# Patient Record
Sex: FEMALE | Race: WHITE | Employment: UNEMPLOYED | URBAN - NONMETROPOLITAN AREA
[De-identification: names, ages, dates, MRNs, and addresses within clinical notes are randomized per-mention and may not be internally consistent; named-entity substitution may affect disease eponyms.]

---

## 2017-02-06 ENCOUNTER — TELEPHONE (OUTPATIENT)
Dept: FAMILY MEDICINE CLINIC | Age: 57
End: 2017-02-06

## 2017-02-06 DIAGNOSIS — M54.5 CHRONIC LOW BACK PAIN, UNSPECIFIED BACK PAIN LATERALITY, WITH SCIATICA PRESENCE UNSPECIFIED: Primary | ICD-10-CM

## 2017-02-06 DIAGNOSIS — G89.29 CHRONIC LOW BACK PAIN, UNSPECIFIED BACK PAIN LATERALITY, WITH SCIATICA PRESENCE UNSPECIFIED: Primary | ICD-10-CM

## 2017-02-06 DIAGNOSIS — M47.812 CERVICAL SPONDYLOSIS WITHOUT MYELOPATHY: ICD-10-CM

## 2017-02-06 DIAGNOSIS — M47.817 LUMBOSACRAL SPONDYLOSIS WITHOUT MYELOPATHY: ICD-10-CM

## 2017-02-06 RX ORDER — BACLOFEN 10 MG/1
10 TABLET ORAL 3 TIMES DAILY PRN
Qty: 60 TABLET | Refills: 0 | Status: SHIPPED | OUTPATIENT
Start: 2017-02-06 | End: 2017-02-27 | Stop reason: SDUPTHER

## 2017-04-20 DIAGNOSIS — M47.812 CERVICAL SPONDYLOSIS WITHOUT MYELOPATHY: ICD-10-CM

## 2017-04-20 DIAGNOSIS — G89.29 CHRONIC LOW BACK PAIN, UNSPECIFIED BACK PAIN LATERALITY, WITH SCIATICA PRESENCE UNSPECIFIED: ICD-10-CM

## 2017-04-20 DIAGNOSIS — M54.5 CHRONIC LOW BACK PAIN, UNSPECIFIED BACK PAIN LATERALITY, WITH SCIATICA PRESENCE UNSPECIFIED: ICD-10-CM

## 2017-04-20 DIAGNOSIS — M47.817 LUMBOSACRAL SPONDYLOSIS WITHOUT MYELOPATHY: ICD-10-CM

## 2017-04-20 RX ORDER — BACLOFEN 10 MG/1
TABLET ORAL
Qty: 60 TABLET | Refills: 2 | Status: SHIPPED | OUTPATIENT
Start: 2017-04-20 | End: 2017-07-13 | Stop reason: SDUPTHER

## 2017-07-25 ENCOUNTER — OFFICE VISIT (OUTPATIENT)
Dept: FAMILY MEDICINE CLINIC | Age: 57
End: 2017-07-25

## 2017-07-25 VITALS
DIASTOLIC BLOOD PRESSURE: 80 MMHG | WEIGHT: 198 LBS | HEART RATE: 98 BPM | SYSTOLIC BLOOD PRESSURE: 150 MMHG | HEIGHT: 61 IN | OXYGEN SATURATION: 99 % | BODY MASS INDEX: 37.38 KG/M2

## 2017-07-25 DIAGNOSIS — G89.29 CHRONIC PAIN OF LEFT ELBOW: Primary | ICD-10-CM

## 2017-07-25 DIAGNOSIS — I10 ESSENTIAL HYPERTENSION: ICD-10-CM

## 2017-07-25 DIAGNOSIS — M25.522 CHRONIC PAIN OF LEFT ELBOW: Primary | ICD-10-CM

## 2017-07-25 DIAGNOSIS — Z12.31 SCREENING MAMMOGRAM, ENCOUNTER FOR: ICD-10-CM

## 2017-07-25 PROCEDURE — 99214 OFFICE O/P EST MOD 30 MIN: CPT | Performed by: FAMILY MEDICINE

## 2017-07-25 ASSESSMENT — ENCOUNTER SYMPTOMS
ABDOMINAL PAIN: 0
BLURRED VISION: 0

## 2017-08-16 ENCOUNTER — HOSPITAL ENCOUNTER (OUTPATIENT)
Dept: GENERAL RADIOLOGY | Age: 57
Discharge: HOME | End: 2017-08-16
Payer: COMMERCIAL

## 2017-08-16 DIAGNOSIS — M79.671 RIGHT FOOT PAIN: ICD-10-CM

## 2017-08-16 PROBLEM — M54.12 CERVICAL NERVE ROOT DISORDER: Status: ACTIVE | Noted: 2017-03-03

## 2017-08-16 PROCEDURE — 73630 X-RAY EXAM OF FOOT: CPT

## 2017-08-17 ENCOUNTER — OFFICE VISIT (OUTPATIENT)
Dept: FAMILY MEDICINE CLINIC | Age: 57
End: 2017-08-17

## 2017-08-17 VITALS
BODY MASS INDEX: 37.49 KG/M2 | SYSTOLIC BLOOD PRESSURE: 162 MMHG | HEIGHT: 61 IN | WEIGHT: 198.6 LBS | OXYGEN SATURATION: 97 % | TEMPERATURE: 98.6 F | HEART RATE: 105 BPM | DIASTOLIC BLOOD PRESSURE: 98 MMHG

## 2017-08-17 DIAGNOSIS — M20.61 TOE DEFORMITY, ACQUIRED, RIGHT: ICD-10-CM

## 2017-08-17 DIAGNOSIS — T23.201D: ICD-10-CM

## 2017-08-17 DIAGNOSIS — M79.671 RIGHT FOOT PAIN: Primary | ICD-10-CM

## 2017-08-17 PROCEDURE — 99213 OFFICE O/P EST LOW 20 MIN: CPT | Performed by: NURSE PRACTITIONER

## 2017-08-17 RX ORDER — MELOXICAM 7.5 MG/1
7.5 TABLET ORAL 2 TIMES DAILY
Qty: 60 TABLET | Refills: 1 | Status: SHIPPED | OUTPATIENT
Start: 2017-08-17 | End: 2018-04-10

## 2017-08-22 ENCOUNTER — APPOINTMENT (OUTPATIENT)
Dept: MRI IMAGING | Age: 57
End: 2017-08-22
Payer: COMMERCIAL

## 2017-08-22 ENCOUNTER — HOSPITAL ENCOUNTER (OUTPATIENT)
Dept: WOMENS IMAGING | Age: 57
Discharge: HOME | End: 2017-08-22
Payer: COMMERCIAL

## 2017-08-22 DIAGNOSIS — Z12.31 SCREENING MAMMOGRAM, ENCOUNTER FOR: ICD-10-CM

## 2017-08-22 PROCEDURE — 77063 BREAST TOMOSYNTHESIS BI: CPT

## 2017-08-25 ENCOUNTER — TELEPHONE (OUTPATIENT)
Dept: FAMILY MEDICINE CLINIC | Age: 57
End: 2017-08-25

## 2017-08-27 DIAGNOSIS — G89.29 CHRONIC PAIN OF LEFT ELBOW: Primary | ICD-10-CM

## 2017-08-27 DIAGNOSIS — M25.522 CHRONIC PAIN OF LEFT ELBOW: Primary | ICD-10-CM

## 2017-08-28 ENCOUNTER — TELEPHONE (OUTPATIENT)
Dept: FAMILY MEDICINE CLINIC | Age: 57
End: 2017-08-28

## 2018-04-10 ENCOUNTER — OFFICE VISIT (OUTPATIENT)
Dept: FAMILY MEDICINE CLINIC | Age: 58
End: 2018-04-10
Payer: COMMERCIAL

## 2018-04-10 VITALS
SYSTOLIC BLOOD PRESSURE: 138 MMHG | DIASTOLIC BLOOD PRESSURE: 90 MMHG | BODY MASS INDEX: 36.7 KG/M2 | WEIGHT: 194.4 LBS | HEIGHT: 61 IN | OXYGEN SATURATION: 98 % | HEART RATE: 103 BPM

## 2018-04-10 DIAGNOSIS — L30.9 DERMATITIS: ICD-10-CM

## 2018-04-10 DIAGNOSIS — R73.9 HYPERGLYCEMIA: ICD-10-CM

## 2018-04-10 DIAGNOSIS — I10 ESSENTIAL HYPERTENSION: ICD-10-CM

## 2018-04-10 DIAGNOSIS — R63.5 WEIGHT GAIN: ICD-10-CM

## 2018-04-10 DIAGNOSIS — I10 ESSENTIAL HYPERTENSION: Primary | ICD-10-CM

## 2018-04-10 LAB
ALT SERPL-CCNC: 32 U/L (ref 0–33)
ANION GAP SERPL CALCULATED.3IONS-SCNC: 13 MEQ/L (ref 7–13)
AST SERPL-CCNC: 23 U/L (ref 0–35)
BASOPHILS ABSOLUTE: 0.1 K/UL (ref 0–0.2)
BASOPHILS RELATIVE PERCENT: 1 %
BUN BLDV-MCNC: 8 MG/DL (ref 6–20)
CALCIUM SERPL-MCNC: 9.3 MG/DL (ref 8.6–10.2)
CHLORIDE BLD-SCNC: 100 MEQ/L (ref 98–107)
CHOLESTEROL, TOTAL: 194 MG/DL (ref 0–199)
CO2: 27 MEQ/L (ref 22–29)
CREAT SERPL-MCNC: 0.5 MG/DL (ref 0.5–0.9)
EOSINOPHILS ABSOLUTE: 0.3 K/UL (ref 0–0.7)
EOSINOPHILS RELATIVE PERCENT: 4 %
GFR AFRICAN AMERICAN: >60
GFR NON-AFRICAN AMERICAN: >60
GLUCOSE BLD-MCNC: 79 MG/DL (ref 74–109)
HBA1C MFR BLD: 5.3 % (ref 4.8–5.9)
HCT VFR BLD CALC: 40.2 % (ref 37–47)
HDLC SERPL-MCNC: 76 MG/DL (ref 40–59)
HEMOGLOBIN: 14 G/DL (ref 12–16)
LDL CHOLESTEROL CALCULATED: 94 MG/DL (ref 0–129)
LYMPHOCYTES ABSOLUTE: 1.6 K/UL (ref 1–4.8)
LYMPHOCYTES RELATIVE PERCENT: 24.7 %
MCH RBC QN AUTO: 34.2 PG (ref 27–31.3)
MCHC RBC AUTO-ENTMCNC: 34.7 % (ref 33–37)
MCV RBC AUTO: 98.5 FL (ref 82–100)
MONOCYTES ABSOLUTE: 0.6 K/UL (ref 0.2–0.8)
MONOCYTES RELATIVE PERCENT: 9.8 %
NEUTROPHILS ABSOLUTE: 4 K/UL (ref 1.4–6.5)
NEUTROPHILS RELATIVE PERCENT: 60.5 %
PDW BLD-RTO: 12.5 % (ref 11.5–14.5)
PLATELET # BLD: 307 K/UL (ref 130–400)
POTASSIUM SERPL-SCNC: 4.3 MEQ/L (ref 3.5–5.1)
RBC # BLD: 4.08 M/UL (ref 4.2–5.4)
SODIUM BLD-SCNC: 140 MEQ/L (ref 132–144)
TRIGL SERPL-MCNC: 122 MG/DL (ref 0–200)
TSH SERPL DL<=0.05 MIU/L-ACNC: 1.53 UIU/ML (ref 0.27–4.2)
WBC # BLD: 6.6 K/UL (ref 4.8–10.8)

## 2018-04-10 PROCEDURE — 99214 OFFICE O/P EST MOD 30 MIN: CPT | Performed by: FAMILY MEDICINE

## 2018-04-10 RX ORDER — TRIAMCINOLONE ACETONIDE 1 MG/G
CREAM TOPICAL
Qty: 80 G | Refills: 1 | Status: SHIPPED | OUTPATIENT
Start: 2018-04-10 | End: 2018-06-11 | Stop reason: SDUPTHER

## 2018-04-10 RX ORDER — LISINOPRIL AND HYDROCHLOROTHIAZIDE 25; 20 MG/1; MG/1
1 TABLET ORAL
Qty: 30 TABLET | Refills: 3 | Status: SHIPPED | OUTPATIENT
Start: 2018-04-10 | End: 2018-07-15 | Stop reason: ALTCHOICE

## 2018-04-10 RX ORDER — LISINOPRIL 20 MG/1
20 TABLET ORAL EVERY EVENING
Qty: 30 TABLET | Refills: 3 | Status: SHIPPED | OUTPATIENT
Start: 2018-04-10 | End: 2018-06-11

## 2018-04-10 ASSESSMENT — ENCOUNTER SYMPTOMS
ABDOMINAL PAIN: 0
SHORTNESS OF BREATH: 0
COUGH: 0
DIARRHEA: 0

## 2018-06-11 ENCOUNTER — OFFICE VISIT (OUTPATIENT)
Dept: FAMILY MEDICINE CLINIC | Age: 58
End: 2018-06-11
Payer: COMMERCIAL

## 2018-06-11 VITALS
HEIGHT: 61 IN | SYSTOLIC BLOOD PRESSURE: 138 MMHG | BODY MASS INDEX: 37.76 KG/M2 | WEIGHT: 200 LBS | HEART RATE: 98 BPM | OXYGEN SATURATION: 98 % | DIASTOLIC BLOOD PRESSURE: 82 MMHG

## 2018-06-11 DIAGNOSIS — L30.9 DERMATITIS: ICD-10-CM

## 2018-06-11 DIAGNOSIS — Z79.899 HIGH RISK MEDICATION USE: ICD-10-CM

## 2018-06-11 DIAGNOSIS — I10 ESSENTIAL HYPERTENSION: Primary | ICD-10-CM

## 2018-06-11 DIAGNOSIS — B35.1 TOENAIL FUNGUS: ICD-10-CM

## 2018-06-11 DIAGNOSIS — M51.26 LUMBAR HERNIATED DISC: ICD-10-CM

## 2018-06-11 PROCEDURE — 99213 OFFICE O/P EST LOW 20 MIN: CPT | Performed by: FAMILY MEDICINE

## 2018-06-11 RX ORDER — TRIAMCINOLONE ACETONIDE 1 MG/G
CREAM TOPICAL
Qty: 80 G | Refills: 1 | Status: ON HOLD | OUTPATIENT
Start: 2018-06-11 | End: 2018-09-20

## 2018-06-11 RX ORDER — LISINOPRIL 40 MG/1
40 TABLET ORAL DAILY
Qty: 30 TABLET | Refills: 3 | Status: SHIPPED | OUTPATIENT
Start: 2018-06-11 | End: 2018-07-18

## 2018-06-11 ASSESSMENT — ENCOUNTER SYMPTOMS
SHORTNESS OF BREATH: 0
BACK PAIN: 1
ABDOMINAL PAIN: 0

## 2018-07-12 DIAGNOSIS — Z79.899 HIGH RISK MEDICATION USE: ICD-10-CM

## 2018-07-12 DIAGNOSIS — I10 ESSENTIAL HYPERTENSION: ICD-10-CM

## 2018-07-12 LAB
ANION GAP SERPL CALCULATED.3IONS-SCNC: 17 MEQ/L (ref 7–13)
BUN BLDV-MCNC: 10 MG/DL (ref 6–20)
CALCIUM SERPL-MCNC: 8.7 MG/DL (ref 8.6–10.2)
CHLORIDE BLD-SCNC: 91 MEQ/L (ref 98–107)
CO2: 29 MEQ/L (ref 22–29)
CREAT SERPL-MCNC: 0.58 MG/DL (ref 0.5–0.9)
GFR AFRICAN AMERICAN: >60
GFR NON-AFRICAN AMERICAN: >60
GLUCOSE BLD-MCNC: 81 MG/DL (ref 74–109)
POTASSIUM SERPL-SCNC: 3.2 MEQ/L (ref 3.5–5.1)
SODIUM BLD-SCNC: 137 MEQ/L (ref 132–144)

## 2018-07-15 DIAGNOSIS — E87.6 HYPOKALEMIA: Primary | ICD-10-CM

## 2018-07-15 DIAGNOSIS — E87.8 HYPOCHLOREMIA: ICD-10-CM

## 2018-07-18 ENCOUNTER — TELEPHONE (OUTPATIENT)
Dept: FAMILY MEDICINE CLINIC | Age: 58
End: 2018-07-18

## 2018-07-18 RX ORDER — LISINOPRIL 20 MG/1
20 TABLET ORAL DAILY
Qty: 30 TABLET | Refills: 5 | Status: SHIPPED | OUTPATIENT
Start: 2018-07-18 | End: 2019-01-15 | Stop reason: SDUPTHER

## 2018-07-18 NOTE — TELEPHONE ENCOUNTER
Pt called, states that Ashok Jerry told her Lisinopril 20 without diuretic would be sent to pharmacy, due to dehydration. Please send to [facility] in [place]. Pt of Dr. Jase Paul.

## 2018-07-25 ENCOUNTER — HOSPITAL ENCOUNTER (OUTPATIENT)
Dept: MRI IMAGING | Age: 58
Discharge: HOME | End: 2018-07-27
Payer: COMMERCIAL

## 2018-07-25 DIAGNOSIS — G95.9 CERVICAL MYELOPATHY (HCC): ICD-10-CM

## 2018-07-25 DIAGNOSIS — M54.16 LUMBAR RADICULOPATHY: ICD-10-CM

## 2018-07-25 DIAGNOSIS — M47.816 LUMBAR SPONDYLOSIS: ICD-10-CM

## 2018-07-25 PROCEDURE — 72156 MRI NECK SPINE W/O & W/DYE: CPT

## 2018-07-25 PROCEDURE — 6360000004 HC RX CONTRAST MEDICATION: Performed by: NEUROLOGICAL SURGERY

## 2018-07-25 PROCEDURE — A9579 GAD-BASE MR CONTRAST NOS,1ML: HCPCS | Performed by: NEUROLOGICAL SURGERY

## 2018-07-25 PROCEDURE — 72158 MRI LUMBAR SPINE W/O & W/DYE: CPT

## 2018-07-25 RX ADMIN — GADOTERIDOL 17 ML: 279.3 INJECTION, SOLUTION INTRAVENOUS at 18:30

## 2018-08-16 DIAGNOSIS — E87.6 HYPOKALEMIA: ICD-10-CM

## 2018-08-16 DIAGNOSIS — E87.8 HYPOCHLOREMIA: ICD-10-CM

## 2018-08-16 LAB
ANION GAP SERPL CALCULATED.3IONS-SCNC: 18 MEQ/L (ref 7–13)
BUN BLDV-MCNC: 5 MG/DL (ref 6–20)
CALCIUM SERPL-MCNC: 8.8 MG/DL (ref 8.6–10.2)
CHLORIDE BLD-SCNC: 97 MEQ/L (ref 98–107)
CO2: 21 MEQ/L (ref 22–29)
CREAT SERPL-MCNC: 0.67 MG/DL (ref 0.5–0.9)
GFR AFRICAN AMERICAN: >60
GFR NON-AFRICAN AMERICAN: >60
GLUCOSE BLD-MCNC: 118 MG/DL (ref 74–109)
POTASSIUM SERPL-SCNC: 3.9 MEQ/L (ref 3.5–5.1)
SODIUM BLD-SCNC: 136 MEQ/L (ref 132–144)

## 2018-08-20 DIAGNOSIS — R73.9 HYPERGLYCEMIA: Primary | ICD-10-CM

## 2018-08-24 DIAGNOSIS — R73.9 HYPERGLYCEMIA: ICD-10-CM

## 2018-08-24 LAB — HBA1C MFR BLD: 5.4 % (ref 4.8–5.9)

## 2018-09-11 ENCOUNTER — OFFICE VISIT (OUTPATIENT)
Dept: FAMILY MEDICINE CLINIC | Age: 58
End: 2018-09-11
Payer: COMMERCIAL

## 2018-09-11 VITALS
HEIGHT: 61 IN | BODY MASS INDEX: 34.93 KG/M2 | HEART RATE: 82 BPM | DIASTOLIC BLOOD PRESSURE: 72 MMHG | OXYGEN SATURATION: 99 % | WEIGHT: 185 LBS | SYSTOLIC BLOOD PRESSURE: 128 MMHG

## 2018-09-11 DIAGNOSIS — L92.3 TATTOO REACTION: ICD-10-CM

## 2018-09-11 DIAGNOSIS — M54.5 CHRONIC LOW BACK PAIN, UNSPECIFIED BACK PAIN LATERALITY, WITH SCIATICA PRESENCE UNSPECIFIED: ICD-10-CM

## 2018-09-11 DIAGNOSIS — G89.29 CHRONIC LOW BACK PAIN, UNSPECIFIED BACK PAIN LATERALITY, WITH SCIATICA PRESENCE UNSPECIFIED: ICD-10-CM

## 2018-09-11 DIAGNOSIS — Z12.31 SCREENING MAMMOGRAM, ENCOUNTER FOR: ICD-10-CM

## 2018-09-11 DIAGNOSIS — I10 ESSENTIAL HYPERTENSION: Primary | ICD-10-CM

## 2018-09-11 PROCEDURE — 99214 OFFICE O/P EST MOD 30 MIN: CPT | Performed by: FAMILY MEDICINE

## 2018-09-11 RX ORDER — CICLOPIROX 7.7 MG/G
GEL TOPICAL 2 TIMES DAILY
COMMUNITY
End: 2021-05-24 | Stop reason: SDUPTHER

## 2018-09-11 ASSESSMENT — ENCOUNTER SYMPTOMS
ABDOMINAL PAIN: 0
SHORTNESS OF BREATH: 0
BACK PAIN: 1
BLURRED VISION: 0

## 2018-09-11 ASSESSMENT — PATIENT HEALTH QUESTIONNAIRE - PHQ9
SUM OF ALL RESPONSES TO PHQ QUESTIONS 1-9: 0
1. LITTLE INTEREST OR PLEASURE IN DOING THINGS: 0
SUM OF ALL RESPONSES TO PHQ9 QUESTIONS 1 & 2: 0
SUM OF ALL RESPONSES TO PHQ QUESTIONS 1-9: 0
2. FEELING DOWN, DEPRESSED OR HOPELESS: 0

## 2018-09-11 NOTE — PROGRESS NOTES
Subjective  Will Sauer, 66 y.o. female presents today with:  Chief Complaint   Patient presents with    3 Month Follow-Up       Hypertension   This is a new problem. The current episode started more than 1 year ago. The problem has been rapidly improving since onset. The problem is controlled. Pertinent negatives include no blurred vision, chest pain, palpitations or shortness of breath. Agents associated with hypertension include NSAIDs. Risk factors for coronary artery disease include obesity and post-menopausal state. Past treatments include ACE inhibitors. The current treatment provides significant improvement. There are no compliance problems. There is no history of kidney disease. Here today for 3 month f/u on chronic problems including HTN, chronic back pain. She has been seeing Dr. Paulie Ward and is considering surgery for her lower back. She saw Austin Cantu for her foot and does not have to see him anymore. She was referred to derm/Dr. More Hanson for a rash on her right foot and the rash is now gone. She is having jaw pain on the right and is seeing her dentist. She also got a tattoo on her left upper arm that is hurting. She got the tattoo 3 weeks ago. States the  cut her left upper arm. Review of Systems   Constitutional: Negative for fever. Eyes: Negative for blurred vision. Respiratory: Negative for shortness of breath. Cardiovascular: Negative for chest pain and palpitations. Gastrointestinal: Negative for abdominal pain. Musculoskeletal: Positive for back pain. Skin: Negative for rash.        Past Medical History:   Diagnosis Date    Alcoholic fatty liver 91/47/7479    By  Liver U/S 12/09/14    Arthritis     Borderline hyperlipidemia 11/13/2014    Bunion, right foot     Chronic back pain     Chronic neck pain     Degenerative disc disease, lumbar 11/13/2014    L5-S1    Dermatitis     Elevated liver enzymes 11/13/2014    History of alcohol abuse 12/3/2014    Hypochloremia 2014    Hypokalemia     Hyponatremia     Lateral epicondylitis 3/14/2016     Updating Deprecated Diagnoses    Lumbar herniated disc     Obesity (BMI 30-39.9) 2014    Pedal edema     Restless leg syndrome     RLS (restless legs syndrome)     Thrombocytosis (HCC)     Tinea pedis 2014    Toenail fungus     UTI (lower urinary tract infection) 2014     Past Surgical History:   Procedure Laterality Date    BACK SURGERY  2015    Dr. Aaron Richardson Right 12/15/2016    [facility]  LAPIDUS BUNIONECTOMY     SECTION      CHOLECYSTECTOMY, LAPAROSCOPIC  2018    Dr. Stephanie Soto       Social History     Social History    Marital status:      Spouse name: N/A    Number of children: 2    Years of education: N/A     Occupational History    unemployed      Social History Main Topics    Smoking status: Never Smoker    Smokeless tobacco: Never Used      Comment: pt states that she smoked maybe 5 cigarettes in  her teens.  Alcohol use No      Comment: quit 2015    Drug use: No    Sexual activity: Yes     Partners: Male     Other Topics Concern    Not on file     Social History Narrative    No narrative on file     Family History   Problem Relation Age of Onset    High Blood Pressure Mother     Arthritis Mother     High Blood Pressure Father     Arthritis Father      Allergies   Allergen Reactions    Amlodipine Swelling    Lyrica [Pregabalin] Other (See Comments)     PASSING OUT  Passed out    Toprol Xl [Metoprolol] Other (See Comments) and Swelling     swelling     Current Outpatient Prescriptions   Medication Sig Dispense Refill    Ciclopirox (LOPROX) 0.77 % gel Apply topically 2 times daily      mupirocin (BACTROBAN) 2 % ointment Apply 3 times daily.  30 g 0    lisinopril (PRINIVIL;ZESTRIL) 20 MG tablet Take 1 tablet by mouth daily 30 tablet 5    ciclopirox (PENLAC) 8 % solution Apply nightly to nails, Should wipe off once weekly with alcohol soaked cotton ball. 6.6 mL 5    diclofenac (VOLTAREN) 50 MG EC tablet Take 1 tablet by mouth 2 times daily 60 tablet 3    DULoxetine (CYMBALTA) 60 MG extended release capsule TAKE 1 CAPSULE BY MOUTH DAILY 30 capsule 11    pramipexole (MIRAPEX) 0.125 MG tablet Take 1 tablet by mouth nightly 30 tablet 5    baclofen (LIORESAL) 10 MG tablet Take 1 tablet by mouth 2 times daily as needed for muscle pain and spasms 60 tablet 2    clindamycin (CLEOCIN T) 1 % lotion Apply topically 2 times daily for 2 weeks 60 mL 0    triamcinolone (KENALOG) 0.1 % cream Apply topically 2 times daily Monday through Friday only. Take weekend off. Do not use on face, groin, armpits, breasts tissue. 80 g 1    Wound Dressings ([facility] WOUND/BURN DRESSING) GEL gel Apply to affected areas 2 times daily 44 mL 3     Current Facility-Administered Medications   Medication Dose Route Frequency Provider Last Rate Last Dose    dexamethasone (PF) (DECADRON) injection 10 mg  10 mg Other Once Cholo Nielsen MD           Objective    Vitals:    09/11/18 1634   BP: 128/72   Pulse: 82   SpO2: 99%   Weight: 185 lb (83.9 kg)   Height: 5' 1\" (1.549 m)       Physical Exam   Constitutional: She appears well-developed and well-nourished. HENT:   Head: Normocephalic and atraumatic. Right Ear: Tympanic membrane, external ear and ear canal normal.   Left Ear: Tympanic membrane, external ear and ear canal normal.   Nose: Nose normal.   Mouth/Throat: Uvula is midline, oropharynx is clear and moist and mucous membranes are normal.   Neck: Normal range of motion. Neck supple. Cardiovascular: Normal rate, regular rhythm and normal heart sounds. No murmur heard. Pulmonary/Chest: Effort normal and breath sounds normal. She has no wheezes. Lymphadenopathy:     She has no cervical adenopathy. Skin: Skin is warm and dry. No rash noted.    New tattoo on left upper arm       Assessment & Plan Diagnosis Orders   1. Essential hypertension     2. Chronic low back pain, unspecified back pain laterality, with sciatica presence unspecified     3. Tattoo reaction  mupirocin (BACTROBAN) 2 % ointment   4. Screening mammogram, encounter for  ARNOL DIGITAL SCREEN W CAD BILATERAL     BP well controlled co continue same. Plan as noted above. She will f/u with Dr. Holly Carver as directed. She will apply mupirocin ointment. Orders Placed This Encounter   Procedures    ARNOL DIGITAL SCREEN W CAD BILATERAL     Standing Status:   Future     Standing Expiration Date:   11/9/2019     Orders Placed This Encounter   Medications    mupirocin (BACTROBAN) 2 % ointment     Sig: Apply 3 times daily. Dispense:  30 g     Refill:  0     There are no discontinued medications. Return in about 4 months (around 1/11/2019).     Kraol Miranda MD

## 2018-09-13 ENCOUNTER — TELEPHONE (OUTPATIENT)
Dept: FAMILY MEDICINE CLINIC | Age: 58
End: 2018-09-13

## 2018-09-13 NOTE — LETTER
9/17/2018        To Whom It May Concern,        Louann Collins is cleared for her surgery.         Sincerely,        Dr Nadia Tanner

## 2018-09-13 NOTE — TELEPHONE ENCOUNTER
[facility] Dr. Brandon Burnett- Pt is requesting letter stating the pt is cleared for back surgery on 09/20/18. Pt was last seen on 09/11/18. Please advise. Pt phone number [phone].

## 2018-09-19 ENCOUNTER — HOSPITAL ENCOUNTER (OUTPATIENT)
Dept: GENERAL RADIOLOGY | Age: 58
Discharge: HOME | DRG: 455 | End: 2018-09-21
Payer: COMMERCIAL

## 2018-09-19 ENCOUNTER — HOSPITAL ENCOUNTER (OUTPATIENT)
Dept: PREADMISSION TESTING | Age: 58
Discharge: HOME | DRG: 455 | End: 2018-09-23
Payer: COMMERCIAL

## 2018-09-19 VITALS
RESPIRATION RATE: 18 BRPM | OXYGEN SATURATION: 96 % | BODY MASS INDEX: 37.11 KG/M2 | TEMPERATURE: 97.4 F | DIASTOLIC BLOOD PRESSURE: 101 MMHG | HEART RATE: 90 BPM | HEIGHT: 60 IN | SYSTOLIC BLOOD PRESSURE: 174 MMHG | WEIGHT: 189 LBS

## 2018-09-19 LAB
ABO/RH: NORMAL
ANION GAP SERPL CALCULATED.3IONS-SCNC: 19 MEQ/L (ref 7–13)
ANTIBODY SCREEN: NORMAL
BACTERIA: NORMAL /HPF
BILIRUBIN URINE: NEGATIVE
BLOOD, URINE: NEGATIVE
BUN BLDV-MCNC: 5 MG/DL (ref 6–20)
CALCIUM SERPL-MCNC: 8.5 MG/DL (ref 8.6–10.2)
CHLORIDE BLD-SCNC: 98 MEQ/L (ref 98–107)
CLARITY: CLEAR
CO2: 23 MEQ/L (ref 22–29)
COLOR: YELLOW
CREAT SERPL-MCNC: 0.54 MG/DL (ref 0.5–0.9)
EKG ATRIAL RATE: 79 BPM
EKG P AXIS: 36 DEGREES
EKG P-R INTERVAL: 130 MS
EKG Q-T INTERVAL: 414 MS
EKG QRS DURATION: 96 MS
EKG QTC CALCULATION (BAZETT): 474 MS
EKG R AXIS: -12 DEGREES
EKG T AXIS: 46 DEGREES
EKG VENTRICULAR RATE: 79 BPM
GFR AFRICAN AMERICAN: >60
GFR NON-AFRICAN AMERICAN: >60
GLUCOSE BLD-MCNC: 85 MG/DL (ref 74–109)
GLUCOSE URINE: NEGATIVE MG/DL
HCT VFR BLD CALC: 37.9 % (ref 37–47)
HEMOGLOBIN: 13.2 G/DL (ref 12–16)
INR BLD: 1
KETONES, URINE: NEGATIVE MG/DL
LEUKOCYTE ESTERASE, URINE: ABNORMAL
MCH RBC QN AUTO: 34.7 PG (ref 27–31.3)
MCHC RBC AUTO-ENTMCNC: 34.9 % (ref 33–37)
MCV RBC AUTO: 99.7 FL (ref 82–100)
NITRITE, URINE: NEGATIVE
PDW BLD-RTO: 14.2 % (ref 11.5–14.5)
PH UA: 7 (ref 5–9)
PLATELET # BLD: 258 K/UL (ref 130–400)
POTASSIUM SERPL-SCNC: 4 MEQ/L (ref 3.5–5.1)
PROTEIN UA: NEGATIVE MG/DL
PROTHROMBIN TIME: 10.2 SEC (ref 9.6–12.3)
RBC # BLD: 3.81 M/UL (ref 4.2–5.4)
RBC UA: NORMAL /HPF (ref 0–2)
SODIUM BLD-SCNC: 140 MEQ/L (ref 132–144)
SPECIFIC GRAVITY UA: 1 (ref 1–1.03)
URINE REFLEX TO CULTURE: YES
UROBILINOGEN, URINE: 0.2 E.U./DL
WBC # BLD: 5.3 K/UL (ref 4.8–10.8)
WBC UA: NORMAL /HPF (ref 0–5)

## 2018-09-19 PROCEDURE — 86901 BLOOD TYPING SEROLOGIC RH(D): CPT

## 2018-09-19 PROCEDURE — 85610 PROTHROMBIN TIME: CPT

## 2018-09-19 PROCEDURE — 86900 BLOOD TYPING SEROLOGIC ABO: CPT

## 2018-09-19 PROCEDURE — 87081 CULTURE SCREEN ONLY: CPT

## 2018-09-19 PROCEDURE — 87086 URINE CULTURE/COLONY COUNT: CPT

## 2018-09-19 PROCEDURE — 85027 COMPLETE CBC AUTOMATED: CPT

## 2018-09-19 PROCEDURE — 86850 RBC ANTIBODY SCREEN: CPT

## 2018-09-19 PROCEDURE — 93005 ELECTROCARDIOGRAM TRACING: CPT

## 2018-09-19 PROCEDURE — 80048 BASIC METABOLIC PNL TOTAL CA: CPT

## 2018-09-19 PROCEDURE — 72082 X-RAY EXAM ENTIRE SPI 2/3 VW: CPT

## 2018-09-19 PROCEDURE — 81001 URINALYSIS AUTO W/SCOPE: CPT

## 2018-09-19 RX ORDER — GENTAMICIN SULFATE 80 MG/100ML
80 INJECTION, SOLUTION INTRAVENOUS ONCE
Status: CANCELLED | OUTPATIENT
Start: 2018-09-20 | End: 2018-09-20

## 2018-09-19 RX ORDER — SODIUM CHLORIDE, SODIUM LACTATE, POTASSIUM CHLORIDE, CALCIUM CHLORIDE 600; 310; 30; 20 MG/100ML; MG/100ML; MG/100ML; MG/100ML
INJECTION, SOLUTION INTRAVENOUS CONTINUOUS
Status: CANCELLED | OUTPATIENT
Start: 2018-09-20

## 2018-09-19 RX ORDER — SODIUM CHLORIDE 0.9 % (FLUSH) 0.9 %
10 SYRINGE (ML) INJECTION PRN
Status: CANCELLED | OUTPATIENT
Start: 2018-09-19

## 2018-09-19 RX ORDER — SODIUM CHLORIDE 0.9 % (FLUSH) 0.9 %
10 SYRINGE (ML) INJECTION EVERY 12 HOURS SCHEDULED
Status: CANCELLED | OUTPATIENT
Start: 2018-09-19

## 2018-09-19 RX ORDER — SODIUM CHLORIDE, SODIUM LACTATE, POTASSIUM CHLORIDE, CALCIUM CHLORIDE 600; 310; 30; 20 MG/100ML; MG/100ML; MG/100ML; MG/100ML
INJECTION, SOLUTION INTRAVENOUS CONTINUOUS
Status: CANCELLED | OUTPATIENT
Start: 2018-09-19

## 2018-09-19 RX ORDER — LIDOCAINE HYDROCHLORIDE 10 MG/ML
1 INJECTION, SOLUTION EPIDURAL; INFILTRATION; INTRACAUDAL; PERINEURAL
Status: CANCELLED | OUTPATIENT
Start: 2018-09-19 | End: 2018-09-19

## 2018-09-19 ASSESSMENT — ENCOUNTER SYMPTOMS
SORE THROAT: 0
CONSTIPATION: 0
WHEEZING: 0
EYES NEGATIVE: 1
COUGH: 0
DIARRHEA: 0
HEARTBURN: 0
SHORTNESS OF BREATH: 0
STRIDOR: 0
DOUBLE VISION: 0
BACK PAIN: 1
NAUSEA: 1

## 2018-09-19 NOTE — H&P
Nurse Practitioner History and Physical      CHIEF COMPLAINT:  Low back pain     HISTORY OF PRESENT ILLNESS:      The patient is a 66 y.o. female with significant past medical history of lumbar 5 sacral 1 spondylolysis, DDD radiculopathy who presents for lumbar 5 sacral 1 PLIF. C/o low back pain that radiates to both buttocks & bilateral hips & down both legs. States feet become painful & numb & has muscle pain in both anterior thighs. Had lumbar disc OR in 2004. Did well since OR until about 1 year ago with onset of sx that progressively worsened. Scheduled for OR.     Past Medical History:        Diagnosis Date    Alcoholic fatty liver 82/35/9887    By  Liver U/S 12/09/14    Arthritis     Borderline hyperlipidemia 11/13/2014    has never been on meds    Bunion, right foot     Chronic back pain     Chronic neck pain     Degenerative disc disease, lumbar 11/13/2014    L5-S1    Dermatitis     Elevated liver enzymes 11/13/2014    History of alcohol abuse 12/3/2014    Hypertension     meds > 5 yrs    Hypochloremia 11/13/2014    Hypokalemia     Hyponatremia     Lateral epicondylitis 3/14/2016     Updating Deprecated Diagnoses    Lumbar herniated disc     Obesity (BMI 30-39.9) 11/6/2014    Pedal edema     Restless leg syndrome     RLS (restless legs syndrome)     Thrombocytosis (HCC)     Tinea pedis 12/16/2014    Toenail fungus     UTI (lower urinary tract infection) 11/19/2014     Past Surgical History:    Past Surgical History:   Procedure Laterality Date    BACK SURGERY  02/27/2012    Dr. Panchito Shaho / L 4 disc OR    BUNIONECTOMY Right 12/15/2016    [facility]  LAPIDUS BUNIONECTOMY   [id number], LAPAROSCOPIC  02/05/2018    Dr. Eliezer Frederick      L 4 disc OR    TONSILLECTOMY           Medications Prior to Admission:    Current Outpatient Prescriptions   Medication Sig Dispense Refill    Ciclopirox (LOPROX) 0.77 % gel Apply topically 2 times daily      lisinopril (PRINIVIL;ZESTRIL) 20 MG tablet Take 1 tablet by mouth daily (Patient taking differently: Take 60 mg by mouth daily ) 30 tablet 5    ciclopirox (PENLAC) 8 % solution Apply nightly to nails, Should wipe off once weekly with alcohol soaked cotton ball. 6.6 mL 5    triamcinolone (KENALOG) 0.1 % cream Apply topically 2 times daily Monday through Friday only. Take weekend off. Do not use on face, groin, armpits, breasts tissue. 80 g 1    DULoxetine (CYMBALTA) 60 MG extended release capsule TAKE 1 CAPSULE BY MOUTH DAILY 30 capsule 11    Wound Dressings ([facility] WOUND/BURN DRESSING) GEL gel Apply to affected areas 2 times daily 44 mL 3    clindamycin (CLEOCIN T) 1 % lotion Apply topically 2 times daily for 2 weeks 60 mL 0    diclofenac (VOLTAREN) 50 MG EC tablet Take 1 tablet by mouth 2 times daily 60 tablet 3    pramipexole (MIRAPEX) 0.125 MG tablet Take 1 tablet by mouth nightly 30 tablet 5    baclofen (LIORESAL) 10 MG tablet Take 1 tablet by mouth 2 times daily as needed for muscle pain and spasms 60 tablet 2     Current Facility-Administered Medications   Medication Dose Route Frequency Provider Last Rate Last Dose    dexamethasone (PF) (DECADRON) injection 10 mg  10 mg Other Once Akhil Mas MD           Allergies:  Amlodipine; Lyrica [pregabalin]; and Toprol xl [metoprolol]    Social History:   Social History     Social History    Marital status:      Spouse name: N/A    Number of children: 2    Years of education: N/A     Occupational History    unemployed      Social History Main Topics    Smoking status: Never Smoker    Smokeless tobacco: Never Used      Comment: pt states that she smoked maybe 5 cigarettes in  her teens.     Alcohol use Yes      Comment: social    Drug use: No    Sexual activity: Yes     Partners: Male     Other Topics Concern    Not on file     Social History Narrative    No narrative on file       Family History:   Family History Problem Relation Age of Onset    High Blood Pressure Mother     Arthritis Mother     High Blood Pressure Father     Arthritis Father     High Blood Pressure Sister     Depression Sister     High Blood Pressure Brother     Arthritis Daughter         fibromyalgia       Review of Systems   Constitutional: Negative. Negative for fever. HENT: Positive for congestion (due to allergies). Negative for sore throat. Eyes: Negative. Negative for double vision. Respiratory: Negative for cough, shortness of breath, wheezing and stridor. Cardiovascular: Negative for chest pain, palpitations and leg swelling. Gastrointestinal: Positive for nausea (due to back pain). Negative for constipation, diarrhea and heartburn. Genitourinary: Negative for dysuria and frequency. Musculoskeletal: Positive for back pain (low back pain radiates to both hips & down both legs), joint pain (right ankle recent sprain / out of air cast) and neck pain (neck pain radiates to left arm / xrays identify arthritis of cervical spine/). Negative for myalgias. Skin: Negative. Neurological: Negative. Negative for seizures and headaches. Endo/Heme/Allergies: Negative. Psychiatric/Behavioral: Positive for depression. Vitals:  BP (!) 174/101   Pulse 90   Temp 97.4 °F (36.3 °C) (Temporal)   Resp 18   Ht 5' (1.524 m)   Wt 189 lb (85.7 kg)   LMP 09/19/2012   SpO2 96%   BMI 36.91 kg/m²     Physical Exam   Constitutional: She is oriented to person, place, and time and well-developed, well-nourished, and in no distress. HENT:   Head: Normocephalic and atraumatic. Right Ear: External ear normal.   Left Ear: External ear normal.   Nose: Nose normal.   Mouth/Throat: Oropharynx is clear and moist.   Limited mouth opening to approx 1.5 inches   Eyes: Pupils are equal, round, and reactive to light. Conjunctivae and EOM are normal.   Neck: Normal range of motion. Neck supple.    Cardiovascular: Normal rate and regular rhythm. Exam reveals no gallop and no friction rub. No murmur heard. Pulmonary/Chest: Effort normal and breath sounds normal. She has no wheezes. She has no rales. Abdominal: Soft. Bowel sounds are normal. She exhibits no mass. There is no tenderness. Midline csection scar. Genitourinary:   Genitourinary Comments: deferred   Musculoskeletal: Normal range of motion. She exhibits no edema. Right lower leg puffiness. Lumbar spine OR scar. Neurological: She is alert and oriented to person, place, and time. Difficulty with ambulation due to bilateral leg pain. Skin: Skin is warm and dry. Psychiatric: Mood, memory, affect and judgment normal.   Vitals reviewed. Assessment:  Patient Active Problem List   Diagnosis    Chronic back pain    Hypertension    Obesity (BMI 30-39. 9)    Degenerative disc disease, lumbar    Hypochloremia    Lumbar herniated disc    Tinea unguium    Lumbosacral spondylosis without myelopathy    Cervical spondylosis without myelopathy    Carpal tunnel syndrome of left wrist    RLS (restless legs syndrome)    Bunion, right foot    Chronic neck pain    Cervical nerve root disorder    Hallux abducto valgus    Metatarsus primus varus         Plan:  Scheduled for Lumbar 5 - sacral 1 PLIF    LOS Mustafa - CNP  9/19/2018  11:05 AM

## 2018-09-20 ENCOUNTER — HOSPITAL ENCOUNTER (OUTPATIENT)
Dept: GENERAL RADIOLOGY | Age: 58
Setting detail: SURGERY ADMIT
Discharge: HOME | DRG: 455 | End: 2018-09-22
Attending: NEUROLOGICAL SURGERY
Payer: COMMERCIAL

## 2018-09-20 ENCOUNTER — HOSPITAL ENCOUNTER (INPATIENT)
Age: 58
LOS: 2 days | Discharge: HOME | DRG: 455 | End: 2018-09-22
Attending: NEUROLOGICAL SURGERY | Admitting: NEUROLOGICAL SURGERY
Payer: COMMERCIAL

## 2018-09-20 ENCOUNTER — ANESTHESIA (OUTPATIENT)
Dept: OPERATING ROOM | Age: 58
DRG: 455 | End: 2018-09-20
Payer: COMMERCIAL

## 2018-09-20 ENCOUNTER — ANESTHESIA EVENT (OUTPATIENT)
Dept: OPERATING ROOM | Age: 58
DRG: 455 | End: 2018-09-20
Payer: COMMERCIAL

## 2018-09-20 ENCOUNTER — APPOINTMENT (OUTPATIENT)
Dept: GENERAL RADIOLOGY | Age: 58
DRG: 455 | End: 2018-09-20
Attending: NEUROLOGICAL SURGERY
Payer: COMMERCIAL

## 2018-09-20 VITALS — TEMPERATURE: 98.1 F | SYSTOLIC BLOOD PRESSURE: 122 MMHG | DIASTOLIC BLOOD PRESSURE: 82 MMHG | OXYGEN SATURATION: 100 %

## 2018-09-20 DIAGNOSIS — Z98.1 S/P LUMBAR FUSION: ICD-10-CM

## 2018-09-20 DIAGNOSIS — M47.816 LUMBAR SPONDYLOSIS: Primary | ICD-10-CM

## 2018-09-20 LAB
ANION GAP SERPL CALCULATED.3IONS-SCNC: 11 MEQ/L (ref 7–13)
BUN BLDV-MCNC: 6 MG/DL (ref 6–20)
CALCIUM SERPL-MCNC: 8.3 MG/DL (ref 8.6–10.2)
CHLORIDE BLD-SCNC: 108 MEQ/L (ref 98–107)
CO2: 26 MEQ/L (ref 22–29)
CREAT SERPL-MCNC: 0.54 MG/DL (ref 0.5–0.9)
GFR AFRICAN AMERICAN: >60
GFR NON-AFRICAN AMERICAN: >60
GLUCOSE BLD-MCNC: 136 MG/DL (ref 74–109)
HCT VFR BLD CALC: 38 % (ref 37–47)
HEMOGLOBIN: 13 G/DL (ref 12–16)
MCH RBC QN AUTO: 34.8 PG (ref 27–31.3)
MCHC RBC AUTO-ENTMCNC: 34.1 % (ref 33–37)
MCV RBC AUTO: 101.9 FL (ref 82–100)
MRSA CULTURE ONLY: NORMAL
PDW BLD-RTO: 14.4 % (ref 11.5–14.5)
PLATELET # BLD: 248 K/UL (ref 130–400)
POTASSIUM REFLEX MAGNESIUM: 4.7 MEQ/L (ref 3.5–5.1)
RBC # BLD: 3.73 M/UL (ref 4.2–5.4)
SODIUM BLD-SCNC: 145 MEQ/L (ref 132–144)
WBC # BLD: 9 K/UL (ref 4.8–10.8)

## 2018-09-20 PROCEDURE — 2500000003 HC RX 250 WO HCPCS: Performed by: NEUROLOGICAL SURGERY

## 2018-09-20 PROCEDURE — 6360000002 HC RX W HCPCS: Performed by: NEUROLOGICAL SURGERY

## 2018-09-20 PROCEDURE — 2709999900 HC NON-CHARGEABLE SUPPLY: Performed by: NEUROLOGICAL SURGERY

## 2018-09-20 PROCEDURE — A4648 IMPLANTABLE TISSUE MARKER: HCPCS | Performed by: NEUROLOGICAL SURGERY

## 2018-09-20 PROCEDURE — 3700000000 HC ANESTHESIA ATTENDED CARE: Performed by: NEUROLOGICAL SURGERY

## 2018-09-20 PROCEDURE — 80048 BASIC METABOLIC PNL TOTAL CA: CPT

## 2018-09-20 PROCEDURE — 0ST40ZZ RESECTION OF LUMBOSACRAL DISC, OPEN APPROACH: ICD-10-PCS | Performed by: NEUROLOGICAL SURGERY

## 2018-09-20 PROCEDURE — 0SG30AJ FUSION OF LUMBOSACRAL JOINT WITH INTERBODY FUSION DEVICE, POSTERIOR APPROACH, ANTERIOR COLUMN, OPEN APPROACH: ICD-10-PCS | Performed by: NEUROLOGICAL SURGERY

## 2018-09-20 PROCEDURE — 3700000001 HC ADD 15 MINUTES (ANESTHESIA): Performed by: NEUROLOGICAL SURGERY

## 2018-09-20 PROCEDURE — 6370000000 HC RX 637 (ALT 250 FOR IP): Performed by: NEUROLOGICAL SURGERY

## 2018-09-20 PROCEDURE — C1713 ANCHOR/SCREW BN/BN,TIS/BN: HCPCS | Performed by: NEUROLOGICAL SURGERY

## 2018-09-20 PROCEDURE — 2500000003 HC RX 250 WO HCPCS: Performed by: NURSE ANESTHETIST, CERTIFIED REGISTERED

## 2018-09-20 PROCEDURE — 2580000003 HC RX 258: Performed by: NURSE PRACTITIONER

## 2018-09-20 PROCEDURE — 2720000010 HC SURG SUPPLY STERILE: Performed by: NEUROLOGICAL SURGERY

## 2018-09-20 PROCEDURE — 0SG3071 FUSION OF LUMBOSACRAL JOINT WITH AUTOLOGOUS TISSUE SUBSTITUTE, POSTERIOR APPROACH, POSTERIOR COLUMN, OPEN APPROACH: ICD-10-PCS | Performed by: NEUROLOGICAL SURGERY

## 2018-09-20 PROCEDURE — 94770 HC ETCO2 MONITOR DAILY: CPT

## 2018-09-20 PROCEDURE — 3209999900 FLUORO FOR SURGICAL PROCEDURES

## 2018-09-20 PROCEDURE — 85027 COMPLETE CBC AUTOMATED: CPT

## 2018-09-20 PROCEDURE — 2580000003 HC RX 258: Performed by: NURSE ANESTHETIST, CERTIFIED REGISTERED

## 2018-09-20 PROCEDURE — 88304 TISSUE EXAM BY PATHOLOGIST: CPT

## 2018-09-20 PROCEDURE — 2580000003 HC RX 258: Performed by: NEUROLOGICAL SURGERY

## 2018-09-20 PROCEDURE — 3600000014 HC SURGERY LEVEL 4 ADDTL 15MIN: Performed by: NEUROLOGICAL SURGERY

## 2018-09-20 PROCEDURE — 6360000002 HC RX W HCPCS: Performed by: NURSE ANESTHETIST, CERTIFIED REGISTERED

## 2018-09-20 PROCEDURE — 2780000010 HC IMPLANT OTHER: Performed by: NEUROLOGICAL SURGERY

## 2018-09-20 PROCEDURE — 1210000000 HC MED SURG R&B

## 2018-09-20 PROCEDURE — 7100000000 HC PACU RECOVERY - FIRST 15 MIN: Performed by: NEUROLOGICAL SURGERY

## 2018-09-20 PROCEDURE — 7100000001 HC PACU RECOVERY - ADDTL 15 MIN: Performed by: NEUROLOGICAL SURGERY

## 2018-09-20 PROCEDURE — 3600000004 HC SURGERY LEVEL 4 BASE: Performed by: NEUROLOGICAL SURGERY

## 2018-09-20 PROCEDURE — 2700000000 HC OXYGEN THERAPY PER DAY

## 2018-09-20 PROCEDURE — 6360000002 HC RX W HCPCS: Performed by: ANESTHESIOLOGY

## 2018-09-20 PROCEDURE — 6360000002 HC RX W HCPCS: Performed by: NURSE PRACTITIONER

## 2018-09-20 PROCEDURE — 88311 DECALCIFY TISSUE: CPT

## 2018-09-20 DEVICE — CONNECTOR SPNL L40-50MM CRSS LO PROF ADJ FOR 5.5MM ROD: Type: IMPLANTABLE DEVICE | Site: SPINE LUMBAR | Status: FUNCTIONAL

## 2018-09-20 DEVICE — SCREW SPNL DIA5.5MM OPN TULIP LOK RELINE: Type: IMPLANTABLE DEVICE | Site: SPINE LUMBAR | Status: FUNCTIONAL

## 2018-09-20 DEVICE — GRAFT BNE SUB 5ML MTRX CELLULAR OSTEOCEL +: Type: IMPLANTABLE DEVICE | Site: SPINE LUMBAR | Status: FUNCTIONAL

## 2018-09-20 DEVICE — GRAFT BNE SUB 30CC 1.7-10MM CANC CHIP MORSELIZED FRZ DRY: Type: IMPLANTABLE DEVICE | Site: SPINE LUMBAR | Status: FUNCTIONAL

## 2018-09-20 DEVICE — AGENT HEMSTAT 8ML FLX TIP MTRX + DISP SURGIFLO: Type: IMPLANTABLE DEVICE | Site: SPINE LUMBAR | Status: FUNCTIONAL

## 2018-09-20 DEVICE — SEALANT SURG 13 YR DURA AUTOSPRAY ADHERUS NUS109] SURGICAL ONE]: Type: IMPLANTABLE DEVICE | Site: SPINE LUMBAR | Status: FUNCTIONAL

## 2018-09-20 DEVICE — SCREW SPNL L50MM DIA6.5MM POST THORACOLUMBOSACRAL POLYAX 2S: Type: IMPLANTABLE DEVICE | Site: SPINE LUMBAR | Status: FUNCTIONAL

## 2018-09-20 DEVICE — SCREW SPNL L55MM DIA6.5MM POST THORACOLUMBOSACRAL POLYAX 2S: Type: IMPLANTABLE DEVICE | Site: SPINE LUMBAR | Status: FUNCTIONAL

## 2018-09-20 RX ORDER — SODIUM CHLORIDE 0.9 % (FLUSH) 0.9 %
10 SYRINGE (ML) INJECTION EVERY 12 HOURS SCHEDULED
Status: DISCONTINUED | OUTPATIENT
Start: 2018-09-20 | End: 2018-09-22 | Stop reason: HOSPADM

## 2018-09-20 RX ORDER — CYCLOBENZAPRINE HCL 10 MG
10 TABLET ORAL 3 TIMES DAILY PRN
Status: DISCONTINUED | OUTPATIENT
Start: 2018-09-20 | End: 2018-09-22 | Stop reason: HOSPADM

## 2018-09-20 RX ORDER — FENTANYL CITRATE 50 UG/ML
INJECTION, SOLUTION INTRAMUSCULAR; INTRAVENOUS PRN
Status: DISCONTINUED | OUTPATIENT
Start: 2018-09-20 | End: 2018-09-20 | Stop reason: SDUPTHER

## 2018-09-20 RX ORDER — DEXAMETHASONE SODIUM PHOSPHATE 10 MG/ML
INJECTION INTRAMUSCULAR; INTRAVENOUS PRN
Status: DISCONTINUED | OUTPATIENT
Start: 2018-09-20 | End: 2018-09-20 | Stop reason: SDUPTHER

## 2018-09-20 RX ORDER — LISINOPRIL 20 MG/1
20 TABLET ORAL DAILY
Status: DISCONTINUED | OUTPATIENT
Start: 2018-09-20 | End: 2018-09-22 | Stop reason: HOSPADM

## 2018-09-20 RX ORDER — FAMOTIDINE 20 MG/1
20 TABLET, FILM COATED ORAL 2 TIMES DAILY
Status: DISCONTINUED | OUTPATIENT
Start: 2018-09-20 | End: 2018-09-22 | Stop reason: HOSPADM

## 2018-09-20 RX ORDER — NALOXONE HYDROCHLORIDE 0.4 MG/ML
0.4 INJECTION, SOLUTION INTRAMUSCULAR; INTRAVENOUS; SUBCUTANEOUS PRN
Status: DISCONTINUED | OUTPATIENT
Start: 2018-09-20 | End: 2018-09-21

## 2018-09-20 RX ORDER — DULOXETIN HYDROCHLORIDE 60 MG/1
60 CAPSULE, DELAYED RELEASE ORAL DAILY
Status: DISCONTINUED | OUTPATIENT
Start: 2018-09-20 | End: 2018-09-22 | Stop reason: HOSPADM

## 2018-09-20 RX ORDER — ROCURONIUM BROMIDE 10 MG/ML
INJECTION, SOLUTION INTRAVENOUS PRN
Status: DISCONTINUED | OUTPATIENT
Start: 2018-09-20 | End: 2018-09-20 | Stop reason: SDUPTHER

## 2018-09-20 RX ORDER — MIDAZOLAM HYDROCHLORIDE 1 MG/ML
INJECTION INTRAMUSCULAR; INTRAVENOUS PRN
Status: DISCONTINUED | OUTPATIENT
Start: 2018-09-20 | End: 2018-09-20 | Stop reason: SDUPTHER

## 2018-09-20 RX ORDER — ONDANSETRON 2 MG/ML
4 INJECTION INTRAMUSCULAR; INTRAVENOUS
Status: DISCONTINUED | OUTPATIENT
Start: 2018-09-20 | End: 2018-09-20 | Stop reason: HOSPADM

## 2018-09-20 RX ORDER — LISINOPRIL 40 MG/1
TABLET ORAL
Status: ON HOLD | COMMUNITY
Start: 2017-08-14 | End: 2018-09-20

## 2018-09-20 RX ORDER — DULOXETIN HYDROCHLORIDE 60 MG/1
60 CAPSULE, DELAYED RELEASE ORAL
Status: ON HOLD | COMMUNITY
Start: 2018-08-05 | End: 2018-09-20

## 2018-09-20 RX ORDER — CICLOPIROX 7.7 MG/G
GEL TOPICAL 2 TIMES DAILY
Status: DISCONTINUED | OUTPATIENT
Start: 2018-09-20 | End: 2018-09-22 | Stop reason: HOSPADM

## 2018-09-20 RX ORDER — SODIUM CHLORIDE 0.9 % (FLUSH) 0.9 %
10 SYRINGE (ML) INJECTION PRN
Status: DISCONTINUED | OUTPATIENT
Start: 2018-09-20 | End: 2018-09-22 | Stop reason: HOSPADM

## 2018-09-20 RX ORDER — ACETAMINOPHEN 325 MG/1
650 TABLET ORAL EVERY 4 HOURS PRN
Status: DISCONTINUED | OUTPATIENT
Start: 2018-09-20 | End: 2018-09-22 | Stop reason: HOSPADM

## 2018-09-20 RX ORDER — DULOXETIN HYDROCHLORIDE 20 MG/1
40 CAPSULE, DELAYED RELEASE ORAL DAILY
Status: DISCONTINUED | OUTPATIENT
Start: 2018-09-21 | End: 2018-09-20

## 2018-09-20 RX ORDER — ONDANSETRON 2 MG/ML
INJECTION INTRAMUSCULAR; INTRAVENOUS PRN
Status: DISCONTINUED | OUTPATIENT
Start: 2018-09-20 | End: 2018-09-20 | Stop reason: SDUPTHER

## 2018-09-20 RX ORDER — DEXTROSE, SODIUM CHLORIDE, AND POTASSIUM CHLORIDE 5; .45; .15 G/100ML; G/100ML; G/100ML
INJECTION INTRAVENOUS CONTINUOUS
Status: DISCONTINUED | OUTPATIENT
Start: 2018-09-20 | End: 2018-09-22 | Stop reason: HOSPADM

## 2018-09-20 RX ORDER — DEXAMETHASONE SODIUM PHOSPHATE 4 MG/ML
4 INJECTION, SOLUTION INTRA-ARTICULAR; INTRALESIONAL; INTRAMUSCULAR; INTRAVENOUS; SOFT TISSUE EVERY 6 HOURS
Status: DISCONTINUED | OUTPATIENT
Start: 2018-09-20 | End: 2018-09-21

## 2018-09-20 RX ORDER — ONDANSETRON 2 MG/ML
4 INJECTION INTRAMUSCULAR; INTRAVENOUS EVERY 6 HOURS PRN
Status: DISCONTINUED | OUTPATIENT
Start: 2018-09-20 | End: 2018-09-22 | Stop reason: HOSPADM

## 2018-09-20 RX ORDER — GENTAMICIN SULFATE 80 MG/100ML
80 INJECTION, SOLUTION INTRAVENOUS ONCE
Status: COMPLETED | OUTPATIENT
Start: 2018-09-20 | End: 2018-09-20

## 2018-09-20 RX ORDER — FENTANYL CITRATE 50 UG/ML
50 INJECTION, SOLUTION INTRAMUSCULAR; INTRAVENOUS EVERY 10 MIN PRN
Status: DISCONTINUED | OUTPATIENT
Start: 2018-09-20 | End: 2018-09-20 | Stop reason: HOSPADM

## 2018-09-20 RX ORDER — SODIUM CHLORIDE, SODIUM LACTATE, POTASSIUM CHLORIDE, CALCIUM CHLORIDE 600; 310; 30; 20 MG/100ML; MG/100ML; MG/100ML; MG/100ML
INJECTION, SOLUTION INTRAVENOUS CONTINUOUS
Status: DISCONTINUED | OUTPATIENT
Start: 2018-09-20 | End: 2018-09-20

## 2018-09-20 RX ORDER — FENTANYL CITRATE 50 UG/ML
INJECTION, SOLUTION INTRAMUSCULAR; INTRAVENOUS PRN
Status: DISCONTINUED | OUTPATIENT
Start: 2018-09-20 | End: 2018-09-20

## 2018-09-20 RX ORDER — MEPERIDINE HYDROCHLORIDE 25 MG/ML
12.5 INJECTION INTRAMUSCULAR; INTRAVENOUS; SUBCUTANEOUS EVERY 5 MIN PRN
Status: DISCONTINUED | OUTPATIENT
Start: 2018-09-20 | End: 2018-09-20 | Stop reason: HOSPADM

## 2018-09-20 RX ORDER — SENNA AND DOCUSATE SODIUM 50; 8.6 MG/1; MG/1
2 TABLET, FILM COATED ORAL 2 TIMES DAILY
Status: DISCONTINUED | OUTPATIENT
Start: 2018-09-20 | End: 2018-09-22 | Stop reason: HOSPADM

## 2018-09-20 RX ORDER — METOCLOPRAMIDE HYDROCHLORIDE 5 MG/ML
10 INJECTION INTRAMUSCULAR; INTRAVENOUS
Status: DISCONTINUED | OUTPATIENT
Start: 2018-09-20 | End: 2018-09-20 | Stop reason: HOSPADM

## 2018-09-20 RX ORDER — HYDROCODONE BITARTRATE AND ACETAMINOPHEN 5; 325 MG/1; MG/1
1 TABLET ORAL PRN
Status: DISCONTINUED | OUTPATIENT
Start: 2018-09-20 | End: 2018-09-20 | Stop reason: HOSPADM

## 2018-09-20 RX ORDER — CLINDAMYCIN PHOSPHATE 10 UG/ML
LOTION TOPICAL 2 TIMES DAILY
Status: DISCONTINUED | OUTPATIENT
Start: 2018-09-20 | End: 2018-09-22 | Stop reason: HOSPADM

## 2018-09-20 RX ORDER — MAGNESIUM HYDROXIDE 1200 MG/15ML
LIQUID ORAL CONTINUOUS PRN
Status: COMPLETED | OUTPATIENT
Start: 2018-09-20 | End: 2018-09-20

## 2018-09-20 RX ORDER — OXYCODONE HYDROCHLORIDE AND ACETAMINOPHEN 5; 325 MG/1; MG/1
1 TABLET ORAL EVERY 4 HOURS PRN
Status: DISCONTINUED | OUTPATIENT
Start: 2018-09-20 | End: 2018-09-22 | Stop reason: HOSPADM

## 2018-09-20 RX ORDER — HYDROCODONE BITARTRATE AND ACETAMINOPHEN 5; 325 MG/1; MG/1
2 TABLET ORAL PRN
Status: DISCONTINUED | OUTPATIENT
Start: 2018-09-20 | End: 2018-09-20 | Stop reason: HOSPADM

## 2018-09-20 RX ORDER — OXYCODONE HYDROCHLORIDE AND ACETAMINOPHEN 5; 325 MG/1; MG/1
2 TABLET ORAL EVERY 4 HOURS PRN
Status: DISCONTINUED | OUTPATIENT
Start: 2018-09-20 | End: 2018-09-22 | Stop reason: HOSPADM

## 2018-09-20 RX ORDER — LIDOCAINE HYDROCHLORIDE 20 MG/ML
INJECTION, SOLUTION INFILTRATION; PERINEURAL PRN
Status: DISCONTINUED | OUTPATIENT
Start: 2018-09-20 | End: 2018-09-20 | Stop reason: SDUPTHER

## 2018-09-20 RX ORDER — PROPOFOL 10 MG/ML
INJECTION, EMULSION INTRAVENOUS PRN
Status: DISCONTINUED | OUTPATIENT
Start: 2018-09-20 | End: 2018-09-20 | Stop reason: SDUPTHER

## 2018-09-20 RX ORDER — PRAMIPEXOLE DIHYDROCHLORIDE 0.25 MG/1
0.12 TABLET ORAL NIGHTLY
Status: DISCONTINUED | OUTPATIENT
Start: 2018-09-20 | End: 2018-09-22 | Stop reason: HOSPADM

## 2018-09-20 RX ORDER — BACLOFEN 10 MG/1
10 TABLET ORAL 2 TIMES DAILY
Status: DISCONTINUED | OUTPATIENT
Start: 2018-09-21 | End: 2018-09-22 | Stop reason: HOSPADM

## 2018-09-20 RX ORDER — DIPHENHYDRAMINE HYDROCHLORIDE 50 MG/ML
12.5 INJECTION INTRAMUSCULAR; INTRAVENOUS
Status: DISCONTINUED | OUTPATIENT
Start: 2018-09-20 | End: 2018-09-20 | Stop reason: HOSPADM

## 2018-09-20 RX ORDER — LIDOCAINE HYDROCHLORIDE 10 MG/ML
1 INJECTION, SOLUTION EPIDURAL; INFILTRATION; INTRACAUDAL; PERINEURAL
Status: DISCONTINUED | OUTPATIENT
Start: 2018-09-20 | End: 2018-09-20 | Stop reason: HOSPADM

## 2018-09-20 RX ORDER — CEPHALEXIN 500 MG/1
500 CAPSULE ORAL EVERY 8 HOURS SCHEDULED
Status: DISCONTINUED | OUTPATIENT
Start: 2018-09-21 | End: 2018-09-22 | Stop reason: HOSPADM

## 2018-09-20 RX ADMIN — MIDAZOLAM HYDROCHLORIDE 2 MG: 1 INJECTION, SOLUTION INTRAMUSCULAR; INTRAVENOUS at 11:14

## 2018-09-20 RX ADMIN — FENTANYL CITRATE 50 MCG: 50 INJECTION, SOLUTION INTRAMUSCULAR; INTRAVENOUS at 15:40

## 2018-09-20 RX ADMIN — LISINOPRIL 20 MG: 20 TABLET ORAL at 18:35

## 2018-09-20 RX ADMIN — ONDANSETRON HYDROCHLORIDE 4 MG: 2 INJECTION, SOLUTION INTRAMUSCULAR; INTRAVENOUS at 15:08

## 2018-09-20 RX ADMIN — FENTANYL CITRATE 50 MCG: 50 INJECTION, SOLUTION INTRAMUSCULAR; INTRAVENOUS at 16:31

## 2018-09-20 RX ADMIN — Medication 2 G: at 11:24

## 2018-09-20 RX ADMIN — GENTAMICIN SULFATE 80 MG: 80 INJECTION, SOLUTION INTRAVENOUS at 11:14

## 2018-09-20 RX ADMIN — PROPOFOL 200 MG: 10 INJECTION, EMULSION INTRAVENOUS at 11:19

## 2018-09-20 RX ADMIN — LIDOCAINE HYDROCHLORIDE 40 MG: 20 INJECTION, SOLUTION INFILTRATION; PERINEURAL at 11:19

## 2018-09-20 RX ADMIN — REMIFENTANIL HYDROCHLORIDE 0.17 MCG/KG/MIN: 1 INJECTION, POWDER, LYOPHILIZED, FOR SOLUTION INTRAVENOUS at 11:33

## 2018-09-20 RX ADMIN — Medication 2 G: at 22:40

## 2018-09-20 RX ADMIN — CYCLOBENZAPRINE HYDROCHLORIDE 10 MG: 10 TABLET, FILM COATED ORAL at 18:35

## 2018-09-20 RX ADMIN — FENTANYL CITRATE 50 MCG: 50 INJECTION, SOLUTION INTRAMUSCULAR; INTRAVENOUS at 15:50

## 2018-09-20 RX ADMIN — ROCURONIUM BROMIDE 50 MG: 10 INJECTION INTRAVENOUS at 11:19

## 2018-09-20 RX ADMIN — POTASSIUM CHLORIDE, DEXTROSE MONOHYDRATE AND SODIUM CHLORIDE: 150; 5; 450 INJECTION, SOLUTION INTRAVENOUS at 22:11

## 2018-09-20 RX ADMIN — OXYCODONE HYDROCHLORIDE AND ACETAMINOPHEN 2 TABLET: 5; 325 TABLET ORAL at 22:40

## 2018-09-20 RX ADMIN — FENTANYL CITRATE: 50 INJECTION, SOLUTION INTRAMUSCULAR; INTRAVENOUS at 16:56

## 2018-09-20 RX ADMIN — SODIUM CHLORIDE, POTASSIUM CHLORIDE, SODIUM LACTATE AND CALCIUM CHLORIDE: 600; 310; 30; 20 INJECTION, SOLUTION INTRAVENOUS at 10:59

## 2018-09-20 RX ADMIN — OXYCODONE HYDROCHLORIDE AND ACETAMINOPHEN 1 TABLET: 5; 325 TABLET ORAL at 18:35

## 2018-09-20 RX ADMIN — SENNOSIDES AND DOCUSATE SODIUM 2 TABLET: 8.6; 5 TABLET ORAL at 18:35

## 2018-09-20 RX ADMIN — PHENYLEPHRINE HYDROCHLORIDE 50 MCG: 10 INJECTION INTRAVENOUS at 14:20

## 2018-09-20 RX ADMIN — SODIUM CHLORIDE, POTASSIUM CHLORIDE, SODIUM LACTATE AND CALCIUM CHLORIDE: 600; 310; 30; 20 INJECTION, SOLUTION INTRAVENOUS at 14:05

## 2018-09-20 RX ADMIN — SODIUM CHLORIDE, POTASSIUM CHLORIDE, SODIUM LACTATE AND CALCIUM CHLORIDE: 600; 310; 30; 20 INJECTION, SOLUTION INTRAVENOUS at 12:14

## 2018-09-20 RX ADMIN — PHENYLEPHRINE HYDROCHLORIDE 50 MCG: 10 INJECTION INTRAVENOUS at 12:20

## 2018-09-20 RX ADMIN — PHENYLEPHRINE HYDROCHLORIDE 50 MCG: 10 INJECTION INTRAVENOUS at 15:04

## 2018-09-20 RX ADMIN — PHENYLEPHRINE HYDROCHLORIDE 50 MCG: 10 INJECTION INTRAVENOUS at 15:00

## 2018-09-20 RX ADMIN — PHENYLEPHRINE HYDROCHLORIDE 100 MCG: 10 INJECTION INTRAVENOUS at 12:43

## 2018-09-20 RX ADMIN — Medication 2 G: at 14:55

## 2018-09-20 RX ADMIN — PRAMIPEXOLE DIHYDROCHLORIDE 0.12 MG: 0.25 TABLET ORAL at 22:11

## 2018-09-20 RX ADMIN — FENTANYL CITRATE 50 MCG: 50 INJECTION, SOLUTION INTRAMUSCULAR; INTRAVENOUS at 16:18

## 2018-09-20 RX ADMIN — DEXAMETHASONE SODIUM PHOSPHATE 10 MG: 10 INJECTION INTRAMUSCULAR; INTRAVENOUS at 11:34

## 2018-09-20 RX ADMIN — DEXAMETHASONE SODIUM PHOSPHATE 4 MG: 4 INJECTION, SOLUTION INTRAMUSCULAR; INTRAVENOUS at 18:35

## 2018-09-20 RX ADMIN — FENTANYL CITRATE 50 MCG: 50 INJECTION, SOLUTION INTRAMUSCULAR; INTRAVENOUS at 15:56

## 2018-09-20 RX ADMIN — FENTANYL CITRATE 50 MCG: 50 INJECTION, SOLUTION INTRAMUSCULAR; INTRAVENOUS at 11:19

## 2018-09-20 ASSESSMENT — PULMONARY FUNCTION TESTS
PIF_VALUE: 23
PIF_VALUE: 19
PIF_VALUE: 20
PIF_VALUE: 4
PIF_VALUE: 1
PIF_VALUE: 20
PIF_VALUE: 24
PIF_VALUE: 22
PIF_VALUE: 21
PIF_VALUE: 20
PIF_VALUE: 15
PIF_VALUE: 19
PIF_VALUE: 19
PIF_VALUE: 20
PIF_VALUE: 24
PIF_VALUE: 20
PIF_VALUE: 20
PIF_VALUE: 19
PIF_VALUE: 20
PIF_VALUE: 21
PIF_VALUE: 20
PIF_VALUE: 19
PIF_VALUE: 19
PIF_VALUE: 20
PIF_VALUE: 19
PIF_VALUE: 20
PIF_VALUE: 3
PIF_VALUE: 19
PIF_VALUE: 19
PIF_VALUE: 22
PIF_VALUE: 20
PIF_VALUE: 2
PIF_VALUE: 22
PIF_VALUE: 20
PIF_VALUE: 19
PIF_VALUE: 18
PIF_VALUE: 22
PIF_VALUE: 20
PIF_VALUE: 20
PIF_VALUE: 19
PIF_VALUE: 19
PIF_VALUE: 25
PIF_VALUE: 19
PIF_VALUE: 19
PIF_VALUE: 20
PIF_VALUE: 26
PIF_VALUE: 19
PIF_VALUE: 22
PIF_VALUE: 19
PIF_VALUE: 16
PIF_VALUE: 20
PIF_VALUE: 19
PIF_VALUE: 20
PIF_VALUE: 22
PIF_VALUE: 24
PIF_VALUE: 19
PIF_VALUE: 20
PIF_VALUE: 19
PIF_VALUE: 19
PIF_VALUE: 20
PIF_VALUE: 25
PIF_VALUE: 20
PIF_VALUE: 21
PIF_VALUE: 20
PIF_VALUE: 24
PIF_VALUE: 20
PIF_VALUE: 20
PIF_VALUE: 21
PIF_VALUE: 20
PIF_VALUE: 6
PIF_VALUE: 20
PIF_VALUE: 20
PIF_VALUE: 1
PIF_VALUE: 22
PIF_VALUE: 20
PIF_VALUE: 19
PIF_VALUE: 20
PIF_VALUE: 20
PIF_VALUE: 19
PIF_VALUE: 20
PIF_VALUE: 20
PIF_VALUE: 21
PIF_VALUE: 20
PIF_VALUE: 22
PIF_VALUE: 22
PIF_VALUE: 21
PIF_VALUE: 24
PIF_VALUE: 20
PIF_VALUE: 21
PIF_VALUE: 19
PIF_VALUE: 20
PIF_VALUE: 0
PIF_VALUE: 23
PIF_VALUE: 20
PIF_VALUE: 2
PIF_VALUE: 25
PIF_VALUE: 22
PIF_VALUE: 20
PIF_VALUE: 20
PIF_VALUE: 19
PIF_VALUE: 22
PIF_VALUE: 20
PIF_VALUE: 20
PIF_VALUE: 19
PIF_VALUE: 20
PIF_VALUE: 22
PIF_VALUE: 19
PIF_VALUE: 20
PIF_VALUE: 20
PIF_VALUE: 19
PIF_VALUE: 22
PIF_VALUE: 20
PIF_VALUE: 20
PIF_VALUE: 25
PIF_VALUE: 20
PIF_VALUE: 25
PIF_VALUE: 20
PIF_VALUE: 20
PIF_VALUE: 19
PIF_VALUE: 20
PIF_VALUE: 21
PIF_VALUE: 20
PIF_VALUE: 24
PIF_VALUE: 19
PIF_VALUE: 19
PIF_VALUE: 22
PIF_VALUE: 19
PIF_VALUE: 20
PIF_VALUE: 20
PIF_VALUE: 21
PIF_VALUE: 22
PIF_VALUE: 20
PIF_VALUE: 18
PIF_VALUE: 20
PIF_VALUE: 20
PIF_VALUE: 19
PIF_VALUE: 19
PIF_VALUE: 20
PIF_VALUE: 23
PIF_VALUE: 20
PIF_VALUE: 19
PIF_VALUE: 20
PIF_VALUE: 0
PIF_VALUE: 19
PIF_VALUE: 20
PIF_VALUE: 20
PIF_VALUE: 21
PIF_VALUE: 25
PIF_VALUE: 20
PIF_VALUE: 22
PIF_VALUE: 19
PIF_VALUE: 19
PIF_VALUE: 20
PIF_VALUE: 19
PIF_VALUE: 21
PIF_VALUE: 20
PIF_VALUE: 20
PIF_VALUE: 23
PIF_VALUE: 20
PIF_VALUE: 19
PIF_VALUE: 21
PIF_VALUE: 20
PIF_VALUE: 20
PIF_VALUE: 19
PIF_VALUE: 19
PIF_VALUE: 20
PIF_VALUE: 2
PIF_VALUE: 20
PIF_VALUE: 19
PIF_VALUE: 19
PIF_VALUE: 22
PIF_VALUE: 19
PIF_VALUE: 20
PIF_VALUE: 20
PIF_VALUE: 0
PIF_VALUE: 20
PIF_VALUE: 25
PIF_VALUE: 20
PIF_VALUE: 19
PIF_VALUE: 20
PIF_VALUE: 20
PIF_VALUE: 21
PIF_VALUE: 21
PIF_VALUE: 20
PIF_VALUE: 19
PIF_VALUE: 19
PIF_VALUE: 22
PIF_VALUE: 19
PIF_VALUE: 20
PIF_VALUE: 19
PIF_VALUE: 20
PIF_VALUE: 22
PIF_VALUE: 20
PIF_VALUE: 19
PIF_VALUE: 21
PIF_VALUE: 19
PIF_VALUE: 19
PIF_VALUE: 18
PIF_VALUE: 20
PIF_VALUE: 19
PIF_VALUE: 20
PIF_VALUE: 1
PIF_VALUE: 18
PIF_VALUE: 20
PIF_VALUE: 19
PIF_VALUE: 21
PIF_VALUE: 20
PIF_VALUE: 19
PIF_VALUE: 20
PIF_VALUE: 21
PIF_VALUE: 19
PIF_VALUE: 20
PIF_VALUE: 19
PIF_VALUE: 20
PIF_VALUE: 20
PIF_VALUE: 19
PIF_VALUE: 15
PIF_VALUE: 23
PIF_VALUE: 19
PIF_VALUE: 18
PIF_VALUE: 20
PIF_VALUE: 22
PIF_VALUE: 20
PIF_VALUE: 20
PIF_VALUE: 19
PIF_VALUE: 19
PIF_VALUE: 21
PIF_VALUE: 19
PIF_VALUE: 20
PIF_VALUE: 19
PIF_VALUE: 21
PIF_VALUE: 19

## 2018-09-20 ASSESSMENT — PAIN - FUNCTIONAL ASSESSMENT: PAIN_FUNCTIONAL_ASSESSMENT: 0-10

## 2018-09-20 ASSESSMENT — PAIN SCALES - GENERAL
PAINLEVEL_OUTOF10: 3
PAINLEVEL_OUTOF10: 5
PAINLEVEL_OUTOF10: 7
PAINLEVEL_OUTOF10: 5
PAINLEVEL_OUTOF10: 5
PAINLEVEL_OUTOF10: 9

## 2018-09-20 ASSESSMENT — PAIN DESCRIPTION - DESCRIPTORS: DESCRIPTORS: ACHING

## 2018-09-20 NOTE — OP NOTE
Operative Note  ______________________________________________________________    Patient: Suagr Spring  YOB: 1960  MRN: [id number]  Date of Procedure: 9/20/2018    Pre-Op Diagnosis: L5-S1 SPONDYLOLYSIS,DEGENERATIVE DISC DISEASE, RADICULOPATHY  History of previous lumbar discectomy L5 S1  Post-Op Diagnosis: Same and severe scar with complete collapsement of L5-S1 degenerative disc disease       Procedure(s):  L5-S1 PLIF (POSTERIOR LUMBAR INTERBODY FUSION)  L5 decompressive laminotomy, significant scar resection with bilateral foraminotomies and facetectomy, L5-S1 bilateral pedicle screws mentation, L5-S1 posterior lumbar interbody biomechanical device insertion, L5-S1 arthrodesis with post lateral fusion, use of SSEP and fluoroscopy and image guided system, use of autograft allograft and DBM  Anesthesia: General    Surgeon(s):  Sudheer Duffy MD    Staff:    First Assistant: Graciela Barron Person First: April Spring Catherine Salinas; Asia Doyle  Scrub Person Second: Hailey Ames RN    Estimated Blood Loss: 350ml        Specimens:   ID Type Source Tests Collected by Time Destination   A : disc Bone Spine SURGICAL PATHOLOGY Sudheer Duffy MD 9/20/2018 1403        Implants:    Implant Name Type Inv.  Item Serial No.  Lot No. LRB No. Used   IMPL SEALANT SURGIFLO HEMOSTAT MATRIC Bone/Graft/Tissue IMPL SEALANT SURGIFLO HEMOSTAT MATRIC  [place]: [employer] [id number] N/A 1   GRAFT CANC CHIP 30CC 1.2DS07YJ - [id number] Bone/Graft/Tissue GRAFT CANC CHIP 30CC [id number] MUSCULOSKELETAL TRANSPLANT FND  N/A 1   AMBER-CELLULAR BONE MATRIX [facility] - [id number] Bone/Graft/Tissue AMBER-CELLULAR BONE MATRIX 5C [id number] [employer]  N/A 1   SEALANT DURAL ADHERUS ET AUTOSPRAY Bone/Graft/Tissue SEALANT DURAL ADHERUS ET AUTOSPRAY  SUR[employer]ICAL [employer] [id number] N/A 1   SCREW POLYAXIAL RELINE-O 2S 6.5X55MM Screw/Plate/Nail/Raffi SCREW POLYAXIAL RELINE-O 2S 6.5X55MM  [employer]  N/A 2   SCREW and decompression, more than doubled amount of time was required for decompression foraminotomies and facetectomy. Using the image guidance and, pedicle screws and pedicles of L5 and S1 with its placement confirmed with a spinal screw stimulation along with the fluoroscopy. Discectomy was further performed  in preparation for lumbar interbody fusion. Again near complete collapse was noted which required more than doubled the time of the usual time required for decompression along with a arthrodesis and biomechanical device insertion. Again after the complete discectomy, peak cages filled with graft sutures were placed bilaterally into the disc space. Remainder of disc space was then filled with cleft mixtures for arthrodesis at this level along with a post lateral fusion by placing graft mixtures into the gutter at L5-S1. Screws were then connected with rods and cross-links with the compression for sagittal balance as well. Gelfoam was applied over the operative field. Hemostasis was inspected with cessation. Paraspinal muscle and fascia were closed with 0 Vicryl and 203 0 Vicryl for subcutaneous layers and staples for skin. Patient tolerated procedure well. Again because of the significant scars along with a near complete disc collapse cement more than doubled the time needed. for surgery was required.       Grant Mccord MD   on 9/20/18 at 4:25 PM

## 2018-09-20 NOTE — ANESTHESIA POSTPROCEDURE EVALUATION
Department of Anesthesiology  Postprocedure Note    Patient: Luis Alfredo Logan  MRN: [id number]  YOB: 1960  Date of evaluation: 9/20/2018  Time:  4:07 PM     Procedure Summary     Date:  09/20/18 Room / Location:  [place] [id number] / [facility] OR    Anesthesia Start:  1114 Anesthesia Stop:  1472    Procedure:  L5-S1 PLIF (POSTERIOR LUMBAR INTERBODY FUSION) (N/A Spine Lumbar) Diagnosis:  (L5-S1 SPONDYLOLYSIS,DEGENERATIVE DISC DISEASE, RADICULOPATHY)    Surgeon:  Mulu Cross MD Responsible Provider:  Hakan Salinas MD    Anesthesia Type:  general ASA Status:  3          Anesthesia Type: general    Nancy Phase I:      Nancy Phase II:      Last vitals: Reviewed and per EMR flowsheets.        Anesthesia Post Evaluation    Patient location during evaluation: PACU  Patient participation: complete - patient participated  Level of consciousness: awake and alert  Pain score: 1  Airway patency: patent  Nausea & Vomiting: no nausea and no vomiting  Complications: no  Cardiovascular status: hemodynamically stable  Respiratory status: acceptable and nasal cannula  Hydration status: euvolemic  Comments: Report to RN, normal sinus rhythm

## 2018-09-21 ENCOUNTER — APPOINTMENT (OUTPATIENT)
Dept: GENERAL RADIOLOGY | Age: 58
DRG: 455 | End: 2018-09-21
Attending: NEUROLOGICAL SURGERY
Payer: COMMERCIAL

## 2018-09-21 DIAGNOSIS — L92.3 TATTOO REACTION: ICD-10-CM

## 2018-09-21 LAB
ANION GAP SERPL CALCULATED.3IONS-SCNC: 11 MEQ/L (ref 7–13)
BASOPHILS ABSOLUTE: 0 K/UL (ref 0–0.2)
BASOPHILS RELATIVE PERCENT: 0.1 %
BUN BLDV-MCNC: 5 MG/DL (ref 6–20)
CALCIUM SERPL-MCNC: 8.5 MG/DL (ref 8.6–10.2)
CHLORIDE BLD-SCNC: 103 MEQ/L (ref 98–107)
CO2: 26 MEQ/L (ref 22–29)
CREAT SERPL-MCNC: 0.51 MG/DL (ref 0.5–0.9)
EOSINOPHILS ABSOLUTE: 0 K/UL (ref 0–0.7)
EOSINOPHILS RELATIVE PERCENT: 0 %
GFR AFRICAN AMERICAN: >60
GFR NON-AFRICAN AMERICAN: >60
GLUCOSE BLD-MCNC: 167 MG/DL (ref 74–109)
HCT VFR BLD CALC: 33.9 % (ref 37–47)
HEMOGLOBIN: 11.7 G/DL (ref 12–16)
LYMPHOCYTES ABSOLUTE: 0.5 K/UL (ref 1–4.8)
LYMPHOCYTES RELATIVE PERCENT: 4.4 %
MCH RBC QN AUTO: 34.7 PG (ref 27–31.3)
MCHC RBC AUTO-ENTMCNC: 34.4 % (ref 33–37)
MCV RBC AUTO: 100.8 FL (ref 82–100)
MONOCYTES ABSOLUTE: 0.8 K/UL (ref 0.2–0.8)
MONOCYTES RELATIVE PERCENT: 6.5 %
NEUTROPHILS ABSOLUTE: 10.9 K/UL (ref 1.4–6.5)
NEUTROPHILS RELATIVE PERCENT: 89 %
PDW BLD-RTO: 14.5 % (ref 11.5–14.5)
PLATELET # BLD: 235 K/UL (ref 130–400)
POTASSIUM REFLEX MAGNESIUM: 4.4 MEQ/L (ref 3.5–5.1)
RBC # BLD: 3.37 M/UL (ref 4.2–5.4)
SODIUM BLD-SCNC: 140 MEQ/L (ref 132–144)
URINE CULTURE, ROUTINE: NORMAL
WBC # BLD: 12.3 K/UL (ref 4.8–10.8)

## 2018-09-21 PROCEDURE — 6370000000 HC RX 637 (ALT 250 FOR IP): Performed by: NURSE PRACTITIONER

## 2018-09-21 PROCEDURE — 97167 OT EVAL HIGH COMPLEX 60 MIN: CPT

## 2018-09-21 PROCEDURE — G8978 MOBILITY CURRENT STATUS: HCPCS

## 2018-09-21 PROCEDURE — G8988 SELF CARE GOAL STATUS: HCPCS

## 2018-09-21 PROCEDURE — G8987 SELF CARE CURRENT STATUS: HCPCS

## 2018-09-21 PROCEDURE — 85025 COMPLETE CBC W/AUTO DIFF WBC: CPT

## 2018-09-21 PROCEDURE — 2580000003 HC RX 258: Performed by: NURSE PRACTITIONER

## 2018-09-21 PROCEDURE — 6360000002 HC RX W HCPCS: Performed by: NURSE PRACTITIONER

## 2018-09-21 PROCEDURE — 80048 BASIC METABOLIC PNL TOTAL CA: CPT

## 2018-09-21 PROCEDURE — G8979 MOBILITY GOAL STATUS: HCPCS

## 2018-09-21 PROCEDURE — 97535 SELF CARE MNGMENT TRAINING: CPT

## 2018-09-21 PROCEDURE — 2580000003 HC RX 258: Performed by: NEUROLOGICAL SURGERY

## 2018-09-21 PROCEDURE — 2700000000 HC OXYGEN THERAPY PER DAY

## 2018-09-21 PROCEDURE — 1210000000 HC MED SURG R&B

## 2018-09-21 PROCEDURE — 97162 PT EVAL MOD COMPLEX 30 MIN: CPT

## 2018-09-21 PROCEDURE — 93010 ELECTROCARDIOGRAM REPORT: CPT | Performed by: INTERNAL MEDICINE

## 2018-09-21 PROCEDURE — 72100 X-RAY EXAM L-S SPINE 2/3 VWS: CPT

## 2018-09-21 PROCEDURE — 6370000000 HC RX 637 (ALT 250 FOR IP): Performed by: NEUROLOGICAL SURGERY

## 2018-09-21 PROCEDURE — 99212 OFFICE O/P EST SF 10 MIN: CPT

## 2018-09-21 PROCEDURE — 36415 COLL VENOUS BLD VENIPUNCTURE: CPT

## 2018-09-21 PROCEDURE — 6360000002 HC RX W HCPCS: Performed by: NEUROLOGICAL SURGERY

## 2018-09-21 RX ORDER — DEXAMETHASONE 1 MG
4 TABLET ORAL EVERY 12 HOURS SCHEDULED
Status: DISCONTINUED | OUTPATIENT
Start: 2018-09-21 | End: 2018-09-22 | Stop reason: HOSPADM

## 2018-09-21 RX ORDER — FENTANYL 25 UG/H
1 PATCH TRANSDERMAL
Status: DISCONTINUED | OUTPATIENT
Start: 2018-09-22 | End: 2018-09-21

## 2018-09-21 RX ORDER — DEXAMETHASONE SODIUM PHOSPHATE 4 MG/ML
2 INJECTION, SOLUTION INTRA-ARTICULAR; INTRALESIONAL; INTRAMUSCULAR; INTRAVENOUS; SOFT TISSUE EVERY 6 HOURS
Status: DISCONTINUED | OUTPATIENT
Start: 2018-09-21 | End: 2018-09-21

## 2018-09-21 RX ORDER — FENTANYL 25 UG/H
1 PATCH TRANSDERMAL
Status: DISCONTINUED | OUTPATIENT
Start: 2018-09-21 | End: 2018-09-22 | Stop reason: HOSPADM

## 2018-09-21 RX ADMIN — CEPHALEXIN 500 MG: 500 CAPSULE ORAL at 11:19

## 2018-09-21 RX ADMIN — CEPHALEXIN 500 MG: 500 CAPSULE ORAL at 20:30

## 2018-09-21 RX ADMIN — OXYCODONE HYDROCHLORIDE AND ACETAMINOPHEN 2 TABLET: 5; 325 TABLET ORAL at 23:27

## 2018-09-21 RX ADMIN — Medication 10 ML: at 10:03

## 2018-09-21 RX ADMIN — BACLOFEN 10 MG: 10 TABLET ORAL at 09:58

## 2018-09-21 RX ADMIN — PRAMIPEXOLE DIHYDROCHLORIDE 0.12 MG: 0.25 TABLET ORAL at 20:30

## 2018-09-21 RX ADMIN — DULOXETINE HYDROCHLORIDE 60 MG: 60 CAPSULE, DELAYED RELEASE ORAL at 10:06

## 2018-09-21 RX ADMIN — BACLOFEN 10 MG: 10 TABLET ORAL at 20:30

## 2018-09-21 RX ADMIN — OXYCODONE HYDROCHLORIDE AND ACETAMINOPHEN 2 TABLET: 5; 325 TABLET ORAL at 07:02

## 2018-09-21 RX ADMIN — OXYCODONE HYDROCHLORIDE AND ACETAMINOPHEN 2 TABLET: 5; 325 TABLET ORAL at 15:17

## 2018-09-21 RX ADMIN — FAMOTIDINE 20 MG: 20 TABLET ORAL at 20:30

## 2018-09-21 RX ADMIN — CLINDAMYCIN PHOSPHATE: 10 LOTION TOPICAL at 20:34

## 2018-09-21 RX ADMIN — OXYCODONE HYDROCHLORIDE AND ACETAMINOPHEN 2 TABLET: 5; 325 TABLET ORAL at 19:21

## 2018-09-21 RX ADMIN — SENNOSIDES AND DOCUSATE SODIUM 2 TABLET: 8.6; 5 TABLET ORAL at 09:58

## 2018-09-21 RX ADMIN — SENNOSIDES AND DOCUSATE SODIUM 2 TABLET: 8.6; 5 TABLET ORAL at 19:21

## 2018-09-21 RX ADMIN — DEXAMETHASONE SODIUM PHOSPHATE 2 MG: 4 INJECTION, SOLUTION INTRAMUSCULAR; INTRAVENOUS at 07:02

## 2018-09-21 RX ADMIN — Medication 2 G: at 07:02

## 2018-09-21 RX ADMIN — DEXAMETHASONE SODIUM PHOSPHATE 4 MG: 4 INJECTION, SOLUTION INTRAMUSCULAR; INTRAVENOUS at 01:06

## 2018-09-21 RX ADMIN — FAMOTIDINE 20 MG: 20 TABLET ORAL at 09:59

## 2018-09-21 RX ADMIN — OXYCODONE HYDROCHLORIDE AND ACETAMINOPHEN 2 TABLET: 5; 325 TABLET ORAL at 02:57

## 2018-09-21 RX ADMIN — DEXAMETHASONE 4 MG: 1 TABLET ORAL at 19:21

## 2018-09-21 RX ADMIN — Medication 10 ML: at 20:53

## 2018-09-21 RX ADMIN — LISINOPRIL 20 MG: 20 TABLET ORAL at 09:58

## 2018-09-21 RX ADMIN — OXYCODONE HYDROCHLORIDE AND ACETAMINOPHEN 2 TABLET: 5; 325 TABLET ORAL at 11:19

## 2018-09-21 RX ADMIN — FENTANYL CITRATE: 50 INJECTION, SOLUTION INTRAMUSCULAR; INTRAVENOUS at 02:48

## 2018-09-21 ASSESSMENT — PAIN SCALES - GENERAL
PAINLEVEL_OUTOF10: 7
PAINLEVEL_OUTOF10: 4
PAINLEVEL_OUTOF10: 3
PAINLEVEL_OUTOF10: 3
PAINLEVEL_OUTOF10: 8
PAINLEVEL_OUTOF10: 7
PAINLEVEL_OUTOF10: 2
PAINLEVEL_OUTOF10: 7

## 2018-09-21 ASSESSMENT — PAIN DESCRIPTION - PAIN TYPE: TYPE: ACUTE PAIN

## 2018-09-21 ASSESSMENT — PAIN DESCRIPTION - LOCATION: LOCATION: BACK

## 2018-09-21 NOTE — PROGRESS NOTES
Max A  Bathing: Mod A  Toileting: SBA  Grooming: Set up seated    Goals:   Patient will:   [x]  Improve functional endurance to tolerate/complete 30 mins of ADL's   [x]  Be Ind in UB ADLs    [x]  Be Mod I in LB ADLs   [x]  Be Mod I in ADL transfers without LOB   [x]  Be Ind in toileting tasks   [x]  Improve B hand fine motor coordination to [facility] in order to manage clothing fasteners/self-care containers in a timely manner   [x]  Improve B UE Function (AROM, strength, motor control, tone normalization) to complete ADLs as projected. [x]  Improve B UE strength and endurance to [facility] in order to participate in self-care activities as projected. [x]  Access appropriate D/C site with as few architectural barriers as possible.       Treatment Plan will consist of:   [x] ADL Training   [x] Strengthening   [x] Endurance   [x] Transfer Training   [x]  DME ed      [x] HEP  [] Manual Therapy   [] AROM/PROM    [] Coordination   [] Cognitive Training   [x]Safety training   [] Other :    Patient Goal: D/C home  Discussed and agreed upon: [x] Yes   [] No Comment:     Assessment/Discharge Disposition:     Performance deficits / Impairments: Decreased functional mobility , Decreased ADL status, Decreased strength, Decreased safe awareness, Decreased cognition, Decreased balance, Decreased high-level IADLs, Decreased endurance  Prognosis: Good  Discharge Recommendations: Continue to assess pending progress  History: multi comorb  Exam: 8 deficits  Assistance / Modification: Max A      Barriers to Improvement:  pain      Discharge Recommendations:  Therapy (Vikas Silva)    Six Click Score  How much help for putting on and taking off regular lower body clothing?: A Lot  How much help for Bathing?: A Lot  How much help for Toileting?: A Little  How much help for putting on and taking off regular upper body clothing?: A Little  How much help for taking care of personal grooming?: A Little  How much help for eating meals?: None  AM-PAC Inpatient

## 2018-09-21 NOTE — PROGRESS NOTES
Physical Therapy Med Surg Initial Assessment  Facility/Department: [facility]  Room: N227/N227-01       NAME: Jannie Dobson  : 1960 (66 y.o.)  MRN: [id number]  CODE STATUS: Full Code    Date of Service: 2018    Patient Diagnosis(es): Lumbar spondylosis [M47.816]   No chief complaint on file.     Patient Active Problem List    Diagnosis Date Noted    Lumbar spondylosis 2018    Cervical nerve root disorder 2017    Chronic neck pain     Hallux abducto valgus 10/26/2016    Metatarsus primus varus 10/26/2016    Bunion, right foot     RLS (restless legs syndrome)     Carpal tunnel syndrome of left wrist 07/15/2016    Cervical spondylosis without myelopathy 10/22/2015    Lumbosacral spondylosis without myelopathy 2015    Tinea unguium 2014    Lumbar herniated disc     Degenerative disc disease, lumbar 2014    Hypochloremia 2014    Hypertension 2014    Obesity (BMI 30-39.9) 2014    Chronic back pain         Past Medical History:   Diagnosis Date    Alcoholic fatty liver     By  Liver U/S 14    Arthritis     Borderline hyperlipidemia 2014    has never been on meds    Bunion, right foot     Chronic back pain     Chronic neck pain     Degenerative disc disease, lumbar 2014    L5-S1    Dermatitis     Elevated liver enzymes 2014    History of alcohol abuse 12/3/2014    Hypertension     meds > 5 yrs    Hypochloremia 2014    Hypokalemia     Hyponatremia     Lateral epicondylitis 3/14/2016     Updating Deprecated Diagnoses    Lumbar herniated disc     Obesity (BMI 30-39.9) 2014    Pedal edema     Restless leg syndrome     RLS (restless legs syndrome)     Thrombocytosis (HCC)     Tinea pedis 2014    Toenail fungus     UTI (lower urinary tract infection) 2014     Past Surgical History:   Procedure Laterality Date    BACK SURGERY  2012    Dr. Panchito Cameron / L 4 disc OR    BUNIONECTOMY Right 12/15/2016    [facility] B[facility] DPJULIO CÉSAR  [address], [place]  02/05/2018    Dr. Eliezer Frederick      L 4 disc OR    TONSILLECTOMY         Chart Reviewed: Yes  Patient assessed for rehabilitation services?: Yes  Family / Caregiver Present: No  General Comment  Comments: Pt awake in bed, agreeable to PT eval    Restrictions:  Restrictions/Precautions: Fall Risk  Position Activity Restriction  Spinal Precautions: No Bending, No Lifting, No Twisting  Other position/activity restrictions: LSO when out of bed, may remove when up in chair or just to go to the bathroom     SUBJECTIVE: Subjective: \"I already feel better than before the surgery\"  Pre Treatment Pain Screening  Pain at present: 5 (reports the pain no longer shoots down her hips/legs)  Intervention List: Patient able to continue with treatment    Post Treatment Pain Screening:   Pain Screening  Patient Currently in Pain: Yes  Pain Assessment  Pain Level: 3 (once positioned up in chair at end of eval)  Pain Type: Acute pain  Pain Location: Back    Prior Level of Function:  Social/Functional History  Lives With: Spouse  Type of Home: House  Home Layout: One level  Home Access: Level entry  Home Equipment: 4 wheeled walker, Standard walker  Receives Help From: Family  ADL Assistance: Needs assistance (spouse assisted with LB dressing)  Homemaking Assistance: Needs assistance  Ambulation Assistance: Independent (RW)  Transfer Assistance: Independent    OBJECTIVE:   Vision/Hearing:       Cognition:  Overall Orientation Status: Within Functional Limits  Follows Commands: Within Functional Limits    Observation/Palpation  Edema: bruising, edema R lateral foot/ankle; pt reports she had a bad sprain a few weeks ago but demo's full AROM and able to bear weight with minimal discomfort    ROM:  RLE AROM: WFL  LLE AROM : WFL    Strength:  Strength RLE  Comment: grossly 4/5  Strength Recommendations: Strengthening, ROM, Balance Training, Functional Mobility Training, Gait Training, Home Exercise Program, Transfer Training, Endurance Training, Neuromuscular Re-education, Positioning, Patient/Caregiver Education & Training, Pain Management, Modalities, Safety Education & Training  Safety Devices  Type of devices: Call light within reach, Chair alarm in place    G-Code:  PT G-Codes  Functional Assessment Tool Used: clinical judgement  Functional Limitation: Mobility: Walking and moving around  Mobility: Walking and Moving Around Current Status (): At least 20 percent but less than 40 percent impaired, limited or restricted  Mobility: Walking and Moving Around Goal Status ():  At least 1 percent but less than 20 percent impaired, limited or restricted    Goals:  Patient goals : go home  Short term goals  Short term goal 1: Pt to demo mod I bed mob  Short term goal 2: Pt to transfer mod I  Short term goal 3: Pt to ambulate >50 ft mod I with LRAD (FWW vs RW)    [facility] (6 CLICK) Daria Wheatley 28 Inpatient Mobility Raw Score : 18    Therapy Time:   Individual   Time In 0917   Time Out 0936   Minutes [address] [place] [address], [address] [facility], 09/21/18 at 9:45 AM

## 2018-09-21 NOTE — PROGRESS NOTES
Wound Ostomy Continence Nurse  Consult Note       NAME:  Sonia Cain  MEDICAL RECORD NUMBER:  [id number]  AGE: 66 y.o.    GENDER: female  : 1960  TODAY'S DATE:  2018    Subjective   Reason for [address] Nurse Evaluation and Assessment: facial abrasions      Sonia Cain is a 66 y.o. female referred by:   [] Physician  [] Nursing  [x] Other: Per Skin Care Protocol    Wound Identification:  Wound Type: skin tear  Contributing Factors: acquired after surgery from tape removal    Wound History: Patient had tape removed from face after surgery and acquired 2 facial abrasions - one on each cheek  Current Wound Care Treatment:  Antibiotic ointment BID    Patient Goal of Care:  [x] Wound Healing  [] Odor Control  [] Palliative Care  [] Pain Control   [] Other:         PAST MEDICAL HISTORY        Diagnosis Date    Alcoholic fatty liver     By  Liver U/S 14    Arthritis     Borderline hyperlipidemia 2014    has never been on meds    Bunion, right foot     Chronic back pain     Chronic neck pain     Degenerative disc disease, lumbar 2014    L5-S1    Dermatitis     Elevated liver enzymes 2014    History of alcohol abuse 12/3/2014    Hypertension     meds > 5 yrs    Hypochloremia 2014    Hypokalemia     Hyponatremia     Lateral epicondylitis 3/14/2016     Updating Deprecated Diagnoses    Lumbar herniated disc     Obesity (BMI 30-39.9) 2014    Pedal edema     Restless leg syndrome     RLS (restless legs syndrome)     Thrombocytosis ([address]     Tinea pedis 2014    Toenail fungus     UTI (lower urinary tract infection) 2014       PAST SURGICAL HISTORY    Past Surgical History:   Procedure Laterality Date    BACK SURGERY  2012    Dr. Gato Pearson / L 4 disc OR    BUNIONECTOMY Right 12/15/2016    [facility]  LAPIDUS BUNIONECTOMY   [address] [facility] [address] [place]  2018    Dr. Stephanie Turk SURGERY      L 4 disc OR    TONSILLECTOMY         FAMILY HISTORY    Family History   Problem Relation Age of Onset    High Blood Pressure Mother     Arthritis Mother     High Blood Pressure Father     Arthritis Father     High Blood Pressure Sister     Depression Sister     High Blood Pressure Brother     Arthritis Daughter         fibromyalgia       SOCIAL HISTORY    Social History   Substance Use Topics    Smoking status: Never Smoker    Smokeless tobacco: Never Used      Comment: pt states that she smoked maybe 5 cigarettes in  her teens.  Alcohol use Yes      Comment: social       ALLERGIES    Allergies   Allergen Reactions    Amlodipine Swelling    Lyrica [Pregabalin] Other (See Comments)     PASSING OUT  Passed out    Toprol Xl [Metoprolol] Other (See Comments) and Swelling     swelling  swelling       MEDICATIONS    No current facility-administered medications on file prior to encounter. Current Outpatient Prescriptions on File Prior to Encounter   Medication Sig Dispense Refill    lisinopril (PRINIVIL;ZESTRIL) 20 MG tablet Take 1 tablet by mouth daily (Patient taking differently: Take 60 mg by mouth daily ) 30 tablet 5    DULoxetine (CYMBALTA) 60 MG extended release capsule TAKE 1 CAPSULE BY MOUTH DAILY 30 capsule 11    baclofen (LIORESAL) 10 MG tablet Take 1 tablet by mouth 2 times daily as needed for muscle pain and spasms 60 tablet 2    Ciclopirox (LOPROX) 0.77 % gel Apply topically 2 times daily      ciclopirox (PENLAC) 8 % solution Apply nightly to nails, Should wipe off once weekly with alcohol soaked cotton ball.  6.6 mL 5    diclofenac (VOLTAREN) 50 MG EC tablet Take 1 tablet by mouth 2 times daily 60 tablet 3    pramipexole (MIRAPEX) 0.125 MG tablet Take 1 tablet by mouth nightly 30 tablet 5    clindamycin (CLEOCIN T) 1 % lotion Apply topically 2 times daily for 2 weeks 60 mL 0       Objective    /67   Pulse 87   Temp 98.1 °F (36.7 °C) (Oral)   Resp 16   Ht 5'

## 2018-09-21 NOTE — FLOWSHEET NOTE
1031-- pt assessment complete, pt alert and oriented x 4, rates pain 2/10, denies n/v, denies sob & cp, call light within reach, bed in lowest position, bed alarm engaged, will continue to monitor. 7723-- wasted 25 mg of fentanyl with second nurse brenda dobbs RN, see documentation. 6812-- pt complains of increased weakness to bilateral lower extremities, spoke with dr Julia Mccall, wanted me to inform patient it is not uncommon, also informed dr Julia Mccall of dressing falling off and needed to be changed.

## 2018-09-22 VITALS
BODY MASS INDEX: 37.11 KG/M2 | WEIGHT: 189 LBS | SYSTOLIC BLOOD PRESSURE: 144 MMHG | TEMPERATURE: 97.9 F | OXYGEN SATURATION: 100 % | DIASTOLIC BLOOD PRESSURE: 87 MMHG | RESPIRATION RATE: 18 BRPM | HEIGHT: 60 IN | HEART RATE: 84 BPM

## 2018-09-22 PROCEDURE — 6370000000 HC RX 637 (ALT 250 FOR IP): Performed by: NEUROLOGICAL SURGERY

## 2018-09-22 PROCEDURE — 2580000003 HC RX 258: Performed by: NURSE PRACTITIONER

## 2018-09-22 PROCEDURE — 6360000002 HC RX W HCPCS: Performed by: NURSE PRACTITIONER

## 2018-09-22 RX ORDER — FENTANYL 25 UG/H
1 PATCH TRANSDERMAL
Qty: 5 PATCH | Refills: 0 | Status: SHIPPED | OUTPATIENT
Start: 2018-09-24 | End: 2018-10-24

## 2018-09-22 RX ORDER — OXYCODONE HYDROCHLORIDE AND ACETAMINOPHEN 5; 325 MG/1; MG/1
1 TABLET ORAL EVERY 6 HOURS PRN
Qty: 40 TABLET | Refills: 0 | Status: SHIPPED | OUTPATIENT
Start: 2018-09-22 | End: 2018-09-26 | Stop reason: SDUPTHER

## 2018-09-22 RX ORDER — SENNA AND DOCUSATE SODIUM 50; 8.6 MG/1; MG/1
2 TABLET, FILM COATED ORAL 2 TIMES DAILY
Qty: 40 TABLET | Refills: 0 | Status: SHIPPED | OUTPATIENT
Start: 2018-09-22 | End: 2019-05-22 | Stop reason: ALTCHOICE

## 2018-09-22 RX ORDER — CEPHALEXIN 500 MG/1
500 CAPSULE ORAL 3 TIMES DAILY
Qty: 21 CAPSULE | Refills: 0 | Status: SHIPPED | OUTPATIENT
Start: 2018-09-22 | End: 2018-09-29

## 2018-09-22 RX ADMIN — Medication 10 ML: at 08:27

## 2018-09-22 RX ADMIN — BACLOFEN 10 MG: 10 TABLET ORAL at 08:26

## 2018-09-22 RX ADMIN — LISINOPRIL 20 MG: 20 TABLET ORAL at 08:26

## 2018-09-22 RX ADMIN — OXYCODONE HYDROCHLORIDE AND ACETAMINOPHEN 2 TABLET: 5; 325 TABLET ORAL at 03:31

## 2018-09-22 RX ADMIN — CEPHALEXIN 500 MG: 500 CAPSULE ORAL at 03:31

## 2018-09-22 RX ADMIN — DEXAMETHASONE 4 MG: 1 TABLET ORAL at 08:39

## 2018-09-22 RX ADMIN — CICLOPIROX: 7.7 GEL TOPICAL at 08:40

## 2018-09-22 RX ADMIN — FAMOTIDINE 20 MG: 20 TABLET ORAL at 08:26

## 2018-09-22 RX ADMIN — CLINDAMYCIN PHOSPHATE: 10 LOTION TOPICAL at 08:41

## 2018-09-22 RX ADMIN — DULOXETINE HYDROCHLORIDE 60 MG: 60 CAPSULE, DELAYED RELEASE ORAL at 08:26

## 2018-09-22 RX ADMIN — OXYCODONE HYDROCHLORIDE AND ACETAMINOPHEN 2 TABLET: 5; 325 TABLET ORAL at 08:26

## 2018-09-22 RX ADMIN — SENNOSIDES AND DOCUSATE SODIUM 2 TABLET: 8.6; 5 TABLET ORAL at 08:26

## 2018-09-22 ASSESSMENT — PAIN SCALES - GENERAL
PAINLEVEL_OUTOF10: 7
PAINLEVEL_OUTOF10: 7

## 2018-09-22 NOTE — PROGRESS NOTES
There are 5 lumbar type vertebrae. Patient undergone pedicle screw and coco of the L5-S1 level with interbody fusion of L5-S1. No acute fractures. IMPRESSION STATUS POST PEDICLE SCREW AND COCO WITH INTERBODY FUSION OF L5-S1 AS DESCRIBED ABOVE. Fluoro For Surgical Procedures    Result Date: 9/21/2018  AMENDED REPORT: EXAMINATION:  FLUORO FOR SURGICAL PROCEDURES CLINICAL HISTORY: Z98.1 S/P lumbar fusion ICD10 COMPARISONS:  NONE AVAILABLE TECHNIQUE:  Intraoperative C-arm fluoroscopy. Fluoroscopic time 28 seconds. Multiple screen capture images, including rotational fluoroscopy recordings with axial and sagittal CT images submitted. No diagnostic radiographic images were obtained. FINDINGS:  Images document L5-S1 decompression fusion with pedicle screws and intervertebral biomechanical device implant. See operative report for further details. INTRAOPERATIVE IMAGING ASSISTANCE FOR L5-S1 DECOMPRESSION FUSION. Fluoro For Surgical Procedures    Result Date: 9/20/2018  EXAMINATION:  FLUORO FOR SURGICAL PROCEDURES CLINICAL HISTORY:  missing suture . Clerance Riis COMPARISONS:  NONE AVAILABLE TECHNIQUE:  3 fluoroscopic images FINDINGS:  Patient is undergone pedicle screw and coco  with interbody fusion. There are bilateral tubal ligation clips. No radiopaque foreign body/needle seen. NO RADIOPAQUE FOREIGN BODY/NEEDLE SEEN.      BP (!) 144/87   Pulse 84   Temp 97.9 °F (36.6 °C) (Oral)   Resp 18   Ht 5' (1.524 m)   Wt 189 lb (85.7 kg)   LMP 09/19/2012   SpO2 100%   BMI 36.91 kg/m²     Status post lumbar decompression with effusion and his mentation L5-S1 postop day 2 some mild complaints of mild weakness but otherwise doing well incisions clean and dry no drainage no headache consistent M today with a wheeled walker. X-ray with satisfaction. Labs satisfactory.     Doing well DC home instructions given

## 2018-09-24 ENCOUNTER — TELEPHONE (OUTPATIENT)
Dept: FAMILY MEDICINE CLINIC | Age: 58
End: 2018-09-24

## 2018-09-24 NOTE — TELEPHONE ENCOUNTER
[facility] Transitions Initial Follow Up Call    Outreach made within 2 business days of discharge: Yes    Patient: Sugar Robison Patient : 1960   MRN: [id number]  Reason for Admission: There are no discharge diagnoses documented for the most recent discharge. Discharge Date: 18       Spoke with: Pt    Discharge department/facility: [address] [place] [address] Interactive Patient Contact:  Was patient able to fill all prescriptions: Yes  Was patient instructed to bring all medications to the follow-up visit: Yes  Is patient taking all medications as directed in the discharge summary?  Yes  Does patient understand their discharge instructions: Yes  Does patient have questions or concerns that need addressed prior to 7-14 day follow up office visit: no  Pt had back surgery and is unable to come in at this time    Scheduled appointment with PCP within 7-14 days    Follow Up  Future Appointments  Date Time Provider [place]   10/3/2018 1:00 PM Sudheer Duffy MD [facility]   10/6/2018 8:45 AM Shahida Smith MD [facility] [address], [place]

## 2018-10-02 ENCOUNTER — OFFICE VISIT (OUTPATIENT)
Dept: FAMILY MEDICINE CLINIC | Age: 58
End: 2018-10-02
Payer: COMMERCIAL

## 2018-10-02 VITALS
SYSTOLIC BLOOD PRESSURE: 152 MMHG | HEART RATE: 115 BPM | BODY MASS INDEX: 35.61 KG/M2 | TEMPERATURE: 98.4 F | DIASTOLIC BLOOD PRESSURE: 80 MMHG | OXYGEN SATURATION: 99 % | HEIGHT: 63 IN | WEIGHT: 201 LBS

## 2018-10-02 DIAGNOSIS — E87.6 HYPOKALEMIA: ICD-10-CM

## 2018-10-02 DIAGNOSIS — R60.0 BILATERAL LEG EDEMA: Primary | ICD-10-CM

## 2018-10-02 DIAGNOSIS — I10 ESSENTIAL HYPERTENSION: ICD-10-CM

## 2018-10-02 PROCEDURE — 99214 OFFICE O/P EST MOD 30 MIN: CPT | Performed by: FAMILY MEDICINE

## 2018-10-02 RX ORDER — FUROSEMIDE 20 MG/1
20 TABLET ORAL DAILY
Qty: 10 TABLET | Refills: 0 | Status: SHIPPED | OUTPATIENT
Start: 2018-10-02 | End: 2019-05-22

## 2018-10-02 ASSESSMENT — ENCOUNTER SYMPTOMS
EYE DISCHARGE: 0
TROUBLE SWALLOWING: 0
COUGH: 0
ABDOMINAL PAIN: 0
CONSTIPATION: 0
ABDOMINAL DISTENTION: 0
CHEST TIGHTNESS: 0
COLOR CHANGE: 0
WHEEZING: 0
VOICE CHANGE: 0
DIARRHEA: 0
SHORTNESS OF BREATH: 0
NAUSEA: 0

## 2018-10-02 NOTE — PROGRESS NOTES
(LOPROX) 0.77 % gel Apply topically 2 times daily      lisinopril (PRINIVIL;ZESTRIL) 20 MG tablet Take 1 tablet by mouth daily (Patient taking differently: Take 60 mg by mouth daily ) 30 tablet 5    ciclopirox (PENLAC) 8 % solution Apply nightly to nails, Should wipe off once weekly with alcohol soaked cotton ball. 6.6 mL 5    DULoxetine (CYMBALTA) 60 MG extended release capsule TAKE 1 CAPSULE BY MOUTH DAILY 30 capsule 11    pramipexole (MIRAPEX) 0.125 MG tablet Take 1 tablet by mouth nightly 30 tablet 5    baclofen (LIORESAL) 10 MG tablet Take 1 tablet by mouth 2 times daily as needed for muscle pain and spasms 60 tablet 2    clindamycin (CLEOCIN T) 1 % lotion Apply topically 2 times daily for 2 weeks 60 mL 0    oxyCODONE-acetaminophen (PERCOCET) 5-325 MG per tablet Take 1 tablet by mouth every 6 hours as needed for Pain for up to 14 days. . 40 tablet 0     No current facility-administered medications on file prior to visit.       Past Medical History:   Diagnosis Date    Alcoholic fatty liver 34/90/7581    By  Liver U/S 12/09/14    Arthritis     Borderline hyperlipidemia 11/13/2014    has never been on meds    Bunion, right foot     Chronic back pain     Chronic neck pain     Degenerative disc disease, lumbar 11/13/2014    L5-S1    Dermatitis     Elevated liver enzymes 11/13/2014    History of alcohol abuse 12/3/2014    Hypertension     meds > 5 yrs    Hypochloremia 11/13/2014    Hypokalemia     Hyponatremia     Lateral epicondylitis 3/14/2016     Updating Deprecated Diagnoses    Lumbar herniated disc     Obesity (BMI 30-39.9) 11/6/2014    Pedal edema     Restless leg syndrome     RLS (restless legs syndrome)     Thrombocytosis (HCC)     Tinea pedis 12/16/2014    Toenail fungus     UTI (lower urinary tract infection) 11/19/2014     Past Surgical History:   Procedure Laterality Date    BACK SURGERY  02/27/2012    Dr. Tiffanie Britton / L 4 disc OR    BUNIONECTOMY Right 12/15/2016    [facility] [address] [facility], [place]  02/05/2018    Dr. Jose Hollis [facility] W/ARTHRD N/A 9/20/2018    L5-S1 PLIF (POSTERIOR LUMBAR INTERBODY FUSION) performed by Jose Antonio Norwood MD at [facility] 94 4 disc OR    TONSILLECTOMY       Social History     Social History    Marital status:      Spouse name: N/A    Number of children: 2    Years of education: N/A     Occupational History    unemployed      Social History Main Topics    Smoking status: Never Smoker    Smokeless tobacco: Never Used      Comment: pt states that she smoked maybe 5 cigarettes in  her teens.  Alcohol use Yes      Comment: social    Drug use: No    Sexual activity: Yes     Partners: Male     Other Topics Concern    Not on file     Social History Narrative    No narrative on file     Family History   Problem Relation Age of Onset    High Blood Pressure Mother     Arthritis Mother     High Blood Pressure Father     Arthritis Father     High Blood Pressure Sister     Depression Sister     High Blood Pressure Brother     Arthritis Daughter         fibromyalgia     Allergies:  Amlodipine; Lyrica [pregabalin]; and Toprol xl [metoprolol]    Review of Systems   Constitutional: Positive for unexpected weight change. Negative for activity change, appetite change, chills, fatigue and fever. HENT: Negative for congestion, dental problem, trouble swallowing and voice change. Eyes: Negative for discharge and visual disturbance. Respiratory: Negative for cough, chest tightness, shortness of breath and wheezing. Cardiovascular: Positive for leg swelling. Negative for chest pain and palpitations. Gastrointestinal: Negative for abdominal distention, abdominal pain, constipation, diarrhea and nausea. Endocrine: Negative for polydipsia and polyuria. Genitourinary: Negative for dysuria and urgency. Likely contributing factors for her current swelling considering her exam is normal otherwise. Follow-up 1 day to confirm patient's weight and fluid status are improving. Patient Education        Leg and Ankle Edema: Care Instructions  Your Care Instructions  Swelling in the legs, ankles, and feet is called edema. It is common after you sit or stand for a while. Long plane flights or car rides often cause swelling in the legs and feet. You may also have swelling if you have to stand for long periods of time at your job. Problems with the veins in the legs (varicose veins) and changes in hormones can also cause swelling. Sometimes the swelling in the ankles and feet is caused by a more serious problem, such as heart failure, infection, blood clots, or liver or kidney disease. Follow-up care is a key part of your treatment and safety. Be sure to make and go to all appointments, and call your doctor if you are having problems. It's also a good idea to know your test results and keep a list of the medicines you take. How can you care for yourself at home? · If your doctor gave you medicine, take it as prescribed. Call your doctor if you think you are having a problem with your medicine. · Whenever you are resting, raise your legs up. Try to keep the swollen area higher than the level of your heart. · Take breaks from standing or sitting in one position. ¨ Walk around to increase the blood flow in your lower legs. ¨ Move your feet and ankles often while you stand, or tighten and relax your leg muscles. · Wear support stockings. Put them on in the morning, before swelling gets worse. · Eat a balanced diet. Lose weight if you need to. · Limit the amount of salt (sodium) in your diet. Salt holds fluid in the body and may increase swelling. When should you call for help? Call 911 anytime you think you may need emergency care.  For example, call if:    · You have symptoms of a blood clot in your lung (called

## 2018-10-02 NOTE — PATIENT INSTRUCTIONS
No need to replace potassium for short-term diuretic with patient having a baseline normal potassium and normal kidney function confirmed by recent hospital labs. Decrease sodium intake over the next few days. Elevate feet above heart level for 10-15 minutes a couple times a day. Continue with compression stockings. Discussed surgical fluids and current foods. Likely contributing factors for her current swelling considering her exam is normal otherwise. Follow-up 1 day to confirm patient's weight and fluid status are improving. Patient Education        Leg and Ankle Edema: Care Instructions  Your Care Instructions  Swelling in the legs, ankles, and feet is called edema. It is common after you sit or stand for a while. Long plane flights or car rides often cause swelling in the legs and feet. You may also have swelling if you have to stand for long periods of time at your job. Problems with the veins in the legs (varicose veins) and changes in hormones can also cause swelling. Sometimes the swelling in the ankles and feet is caused by a more serious problem, such as heart failure, infection, blood clots, or liver or kidney disease. Follow-up care is a key part of your treatment and safety. Be sure to make and go to all appointments, and call your doctor if you are having problems. It's also a good idea to know your test results and keep a list of the medicines you take. How can you care for yourself at home? · If your doctor gave you medicine, take it as prescribed. Call your doctor if you think you are having a problem with your medicine. · Whenever you are resting, raise your legs up. Try to keep the swollen area higher than the level of your heart. · Take breaks from standing or sitting in one position. ¨ Walk around to increase the blood flow in your lower legs. ¨ Move your feet and ankles often while you stand, or tighten and relax your leg muscles. · Wear support stockings.  Put them on in the morning, before swelling gets worse. · Eat a balanced diet. Lose weight if you need to. · Limit the amount of salt (sodium) in your diet. Salt holds fluid in the body and may increase swelling. When should you call for help? Call 911 anytime you think you may need emergency care. For example, call if:    · You have symptoms of a blood clot in your lung (called a pulmonary embolism). These may include:  ¨ Sudden chest pain. ¨ Trouble breathing. ¨ Coughing up blood.    Call your doctor now or seek immediate medical care if:    · You have signs of a blood clot, such as:  ¨ Pain in your calf, back of the knee, thigh, or groin. ¨ Redness and swelling in your leg or groin.     · You have symptoms of infection, such as:  ¨ Increased pain, swelling, warmth, or redness. ¨ Red streaks or pus. ¨ A fever.    Watch closely for changes in your health, and be sure to contact your doctor if:    · Your swelling is getting worse.     · You have new or worsening pain in your legs.     · You do not get better as expected. Where can you learn more? Go to https://[employer]Sidecar. org and sign in to your [employer] account. Enter K752 in the [facility] box to learn more about \"Leg and Ankle Edema: Care Instructions. \"     If you do not have an account, please click on the \"Sign Up Now\" link. Current as of: November 20, 2017  Content Version: 11.7  © 2920-6692 [employer]. Care instructions adapted under license by [facility] [employer] [facility] ([facility]). If you have questions about a medical condition or this instruction, always ask your healthcare professional. [place] [address] any warranty or liability for your use of this information.

## 2018-10-03 ENCOUNTER — OFFICE VISIT (OUTPATIENT)
Dept: FAMILY MEDICINE CLINIC | Age: 58
End: 2018-10-03
Payer: COMMERCIAL

## 2018-10-03 VITALS
TEMPERATURE: 97.6 F | BODY MASS INDEX: 34.91 KG/M2 | SYSTOLIC BLOOD PRESSURE: 110 MMHG | HEIGHT: 63 IN | OXYGEN SATURATION: 98 % | WEIGHT: 197 LBS | HEART RATE: 114 BPM | DIASTOLIC BLOOD PRESSURE: 64 MMHG

## 2018-10-03 DIAGNOSIS — R60.0 BILATERAL LEG EDEMA: ICD-10-CM

## 2018-10-03 DIAGNOSIS — E87.6 HYPOKALEMIA: ICD-10-CM

## 2018-10-03 DIAGNOSIS — I10 ESSENTIAL HYPERTENSION: ICD-10-CM

## 2018-10-03 DIAGNOSIS — Z12.11 COLON CANCER SCREENING: Primary | ICD-10-CM

## 2018-10-03 PROCEDURE — 99212 OFFICE O/P EST SF 10 MIN: CPT | Performed by: FAMILY MEDICINE

## 2018-10-03 ASSESSMENT — ENCOUNTER SYMPTOMS
COUGH: 0
ABDOMINAL PAIN: 0
TROUBLE SWALLOWING: 0
CONSTIPATION: 0
SHORTNESS OF BREATH: 0
VOICE CHANGE: 0
EYE DISCHARGE: 0
ABDOMINAL DISTENTION: 0
NAUSEA: 0
COLOR CHANGE: 0
DIARRHEA: 0

## 2018-10-03 NOTE — PROGRESS NOTES
Subjective:      Patient ID: Christiana Terry is a 66 y.o. female who presents for:  Chief Complaint   Patient presents with    Follow-up     1 day f/u on leg swelling. pt states her legs dont feel as tight since she started lasix.  Health Maintenance     refused colonoscopy but fit test was given. 61-year-old female here for follow-up of pain and swelling of the legs. She was given furosemide yesterday. Instructed to take one dose yesterday 1 dose today. Follow up today reveals decreased swelling and pain according to the patient. she is much more comfortable. She has been paying attention to her salt intake as well. There've been no other changes. There is still no fever, chills or other issues related to postsurgical concerns. Current Outpatient Prescriptions on File Prior to Visit   Medication Sig Dispense Refill    furosemide (LASIX) 20 MG tablet Take 1 tablet by mouth daily 10 tablet 0    oxyCODONE-acetaminophen (PERCOCET) 5-325 MG per tablet Take 1 tablet by mouth every 6 hours as needed for Pain for up to 14 days. . 40 tablet 0    mupirocin (BACTROBAN) 2 % ointment APPLY TO THE AFFECTED AREA(S) THREE TIMES DAILY 22 g 0    sennosides-docusate sodium (SENOKOT-S) 8.6-50 MG tablet Take 2 tablets by mouth 2 times daily 40 tablet 0    fentaNYL (DURAGESIC) 25 MCG/HR Place 1 patch onto the skin every 72 hours for 30 days. . 5 patch 0    Ciclopirox (LOPROX) 0.77 % gel Apply topically 2 times daily      lisinopril (PRINIVIL;ZESTRIL) 20 MG tablet Take 1 tablet by mouth daily (Patient taking differently: Take 60 mg by mouth daily ) 30 tablet 5    ciclopirox (PENLAC) 8 % solution Apply nightly to nails, Should wipe off once weekly with alcohol soaked cotton ball.  6.6 mL 5    DULoxetine (CYMBALTA) 60 MG extended release capsule TAKE 1 CAPSULE BY MOUTH DAILY 30 capsule 11    pramipexole (MIRAPEX) 0.125 MG tablet Take 1 tablet by mouth nightly 30 tablet 5    baclofen (LIORESAL) 10 MG tablet Nose: Nose normal.   Eyes: Pupils are equal, round, and reactive to light. Conjunctivae and EOM are normal. Right eye exhibits no discharge. Left eye exhibits no discharge. Neck: Neck supple. No tracheal deviation present. No thyromegaly present. Cardiovascular: Normal rate and regular rhythm. Pulmonary/Chest: Effort normal. No respiratory distress. Abdominal: She exhibits no distension. Musculoskeletal: She exhibits edema ( 1+ nonpitting edema. More movement and less tenseness of the skin today. No pain on palpation bilateral lower extremities). She exhibits no tenderness or deformity. Neurological: She is alert. Coordination normal.   Skin: Skin is warm and dry. No bruising and no rash noted. She is not diaphoretic. Psychiatric: She has a normal mood and affect. Judgment and thought content normal.       No results found for this visit on 10/03/18. Assessment:       Diagnosis Orders   1. Colon cancer screening  POCT Fecal Immunochemical Test (Fit)   2. Bilateral leg edema     3. Essential hypertension     4. Hypokalemia           Plan:     Patient Instructions   Patient has responded well to 2 doses of furosemide. Weight is down by 4 points. Blood pressure significantly better. She is aware she is to take one more dose, tomorrow Thursday, to finish the current treatment. She can do it every other day dosing of furosemide next week if she is noticing some continued swelling. If any issue occurs past 10 days from now she is to return to the office for further discussion of blood pressure and swelling management. Patient Education        DASH Diet: Care Instructions  Your Care Instructions    The DASH diet is an eating plan that can help lower your blood pressure. DASH stands for Dietary Approaches to Stop Hypertension. Hypertension is high blood pressure. The DASH diet focuses on eating foods that are high in calcium, potassium, and magnesium.  These nutrients can lower blood

## 2018-10-25 DIAGNOSIS — I10 ESSENTIAL HYPERTENSION: ICD-10-CM

## 2018-10-26 RX ORDER — LISINOPRIL 40 MG/1
40 TABLET ORAL DAILY
Qty: 30 TABLET | Refills: 5 | Status: SHIPPED | OUTPATIENT
Start: 2018-10-26 | End: 2019-01-15 | Stop reason: SDUPTHER

## 2019-01-15 DIAGNOSIS — I10 ESSENTIAL HYPERTENSION: ICD-10-CM

## 2019-01-15 RX ORDER — LISINOPRIL 20 MG/1
20 TABLET ORAL DAILY
Qty: 30 TABLET | Refills: 5 | Status: SHIPPED | OUTPATIENT
Start: 2019-01-15 | End: 2019-06-12 | Stop reason: DRUGHIGH

## 2019-01-15 RX ORDER — LISINOPRIL 40 MG/1
40 TABLET ORAL DAILY
Qty: 30 TABLET | Refills: 5 | Status: SHIPPED | OUTPATIENT
Start: 2019-01-15 | End: 2019-06-12 | Stop reason: DRUGHIGH

## 2019-03-26 DIAGNOSIS — Z12.31 BREAST CANCER SCREENING BY MAMMOGRAM: Primary | ICD-10-CM

## 2019-05-02 DIAGNOSIS — M54.5 CHRONIC LOW BACK PAIN, UNSPECIFIED BACK PAIN LATERALITY, WITH SCIATICA PRESENCE UNSPECIFIED: ICD-10-CM

## 2019-05-02 DIAGNOSIS — G89.29 CHRONIC LOW BACK PAIN, UNSPECIFIED BACK PAIN LATERALITY, WITH SCIATICA PRESENCE UNSPECIFIED: ICD-10-CM

## 2019-05-03 RX ORDER — DULOXETIN HYDROCHLORIDE 60 MG/1
60 CAPSULE, DELAYED RELEASE ORAL DAILY
Qty: 30 CAPSULE | Refills: 0 | Status: SHIPPED | OUTPATIENT
Start: 2019-05-03 | End: 2019-06-01 | Stop reason: SDUPTHER

## 2019-05-22 ENCOUNTER — OFFICE VISIT (OUTPATIENT)
Dept: FAMILY MEDICINE CLINIC | Age: 59
End: 2019-05-22
Payer: COMMERCIAL

## 2019-05-22 VITALS
OXYGEN SATURATION: 98 % | HEART RATE: 101 BPM | TEMPERATURE: 98.2 F | DIASTOLIC BLOOD PRESSURE: 102 MMHG | BODY MASS INDEX: 38.02 KG/M2 | HEIGHT: 61 IN | WEIGHT: 201.4 LBS | SYSTOLIC BLOOD PRESSURE: 154 MMHG

## 2019-05-22 DIAGNOSIS — I10 ESSENTIAL HYPERTENSION: Primary | ICD-10-CM

## 2019-05-22 DIAGNOSIS — Z11.4 SCREENING FOR HIV WITHOUT PRESENCE OF RISK FACTORS: ICD-10-CM

## 2019-05-22 DIAGNOSIS — Z72.89 OTHER PROBLEMS RELATED TO LIFESTYLE: ICD-10-CM

## 2019-05-22 PROCEDURE — 99213 OFFICE O/P EST LOW 20 MIN: CPT | Performed by: FAMILY MEDICINE

## 2019-05-22 ASSESSMENT — ENCOUNTER SYMPTOMS
COUGH: 0
COLOR CHANGE: 0
NAUSEA: 0
TROUBLE SWALLOWING: 0
SHORTNESS OF BREATH: 0
DIARRHEA: 0
CONSTIPATION: 0
ABDOMINAL DISTENTION: 0
EYE DISCHARGE: 0
VOICE CHANGE: 0
ABDOMINAL PAIN: 0

## 2019-05-22 NOTE — PROGRESS NOTES
Subjective:      Patient ID: Sravan Danielson is a 66 y.o. female who presents for:  Chief Complaint   Patient presents with    Medication Check     pt states she needed an appointment to check her medications because she hasnt been seen in a while     Hypertension     pt has elivated bp     Colonoscopy     pt states she has a kit at home and hasnt got around to it yet        Hypertension   This is a chronic problem. The current episode started more than 1 year ago. The problem is unchanged. The problem is uncontrolled. Pertinent negatives include no chest pain, headaches, palpitations, peripheral edema or shortness of breath. There are no associated agents to hypertension. There are no known risk factors for coronary artery disease. Past treatments include ACE inhibitors. The current treatment provides mild improvement. Compliance problems include diet and exercise. There is no history of angina. There is no history of chronic renal disease. Current Outpatient Medications on File Prior to Visit   Medication Sig Dispense Refill    DULoxetine (CYMBALTA) 60 MG extended release capsule Take 1 capsule by mouth daily 30 capsule 0    lisinopril (PRINIVIL;ZESTRIL) 40 MG tablet Take 1 tablet by mouth daily Take 1 tablet daily with a 20 mg tablet (total dose 60 mg daily) 30 tablet 5    lisinopril (PRINIVIL;ZESTRIL) 20 MG tablet Take 1 tablet by mouth daily Take one tablet daily with a 40 mg tablet (total dose 60 mg) 30 tablet 5    mupirocin (BACTROBAN) 2 % ointment APPLY TO THE AFFECTED AREA(S) THREE TIMES DAILY 22 g 0    Ciclopirox (LOPROX) 0.77 % gel Apply topically 2 times daily      ciclopirox (PENLAC) 8 % solution Apply nightly to nails, Should wipe off once weekly with alcohol soaked cotton ball. 6.6 mL 5    clindamycin (CLEOCIN T) 1 % lotion Apply topically 2 times daily for 2 weeks 60 mL 0     No current facility-administered medications on file prior to visit.       Past Medical History:   Diagnosis Date    Alcoholic fatty liver 23/48/8012    By  Liver U/S 12/09/14    Arthritis     Borderline hyperlipidemia 11/13/2014    has never been on meds    Bunion, right foot     Chronic back pain     Chronic neck pain     Degenerative disc disease, lumbar 11/13/2014    L5-S1    Dermatitis     Elevated liver enzymes 11/13/2014    History of alcohol abuse 12/3/2014    Hypertension     meds > 5 yrs    Hypochloremia 11/13/2014    Hypokalemia     Hyponatremia     Lateral epicondylitis 3/14/2016     Updating Deprecated Diagnoses    Lumbar herniated disc     Obesity (BMI 30-39.9) 11/6/2014    Pedal edema     Restless leg syndrome     RLS (restless legs syndrome)     Thrombocytosis (HCC)     Tinea pedis 12/16/2014    Toenail fungus     UTI (lower urinary tract infection) 11/19/2014     Past Surgical History:   Procedure Laterality Date    BACK SURGERY  02/27/2012    Dr. Radha Sanz / L 4 disc OR    BUNIONECTOMY Right 12/15/2016    [facility] D[facility]  [address] [facility], [place]AROSCOP[place]  02/05/2018    Dr. Ruthann Webb SPC W/ARTHRD N/A 9/20/2018    L5-S1 PLIF (POSTERIOR LUMBAR INTERBODY FUSION) performed by Nato Rey MD at [place] [address] 4 disc OR    TONSILLECTOMY       Social History     Socioeconomic History    Marital status:      Spouse name: Not on file    Number of children: 2    Years of education: Not on file    Highest education level: Not on file   Occupational History    Occupation: unemployed   Social Needs    Financial resource strain: Not on file    Food insecurity:     Worry: Not on file     Inability: Not on file   [employer] needs:     Medical: Not on file     Non-medical: Not on file   Tobacco Use    Smoking status: Never Smoker    Smokeless tobacco: Never Used    Tobacco comment: pt states that she smoked maybe 5 cigarettes in  her teens.    Substance and Sexual Activity    Alcohol use: Yes     Comment: social    Drug use: No    Sexual activity: Yes     Partners: Male   Lifestyle    Physical activity:     Days per week: Not on file     Minutes per session: Not on file    Stress: Not on file   Relationships    Social connections:     Talks on phone: Not on file     Gets together: Not on file     Attends Jehovah's witness service: Not on file     Active member of club or organization: Not on file     Attends meetings of clubs or organizations: Not on file     Relationship status: Not on file    Intimate partner violence:     Fear of current or ex partner: Not on file     Emotionally abused: Not on file     Physically abused: Not on file     Forced sexual activity: Not on file   Other Topics Concern    Not on file   Social History Narrative    Not on file     Family History   Problem Relation Age of Onset    High Blood Pressure Mother     Arthritis Mother     High Blood Pressure Father     Arthritis Father     High Blood Pressure Sister     Depression Sister     High Blood Pressure Brother     Arthritis Daughter         fibromyalgia     Allergies:  Amlodipine; Lyrica [pregabalin]; and Toprol xl [metoprolol]    Review of Systems   Constitutional: Negative for activity change, appetite change, fatigue and unexpected weight change. HENT: Negative for congestion, dental problem, trouble swallowing and voice change. Eyes: Negative for discharge and visual disturbance. Respiratory: Negative for cough and shortness of breath. Cardiovascular: Negative for chest pain and palpitations. Gastrointestinal: Negative for abdominal distention, abdominal pain, constipation, diarrhea and nausea. Endocrine: Negative for polydipsia and polyuria. Genitourinary: Negative for dysuria and urgency. Musculoskeletal: Negative for gait problem and joint swelling. Skin: Negative for color change and rash.    Allergic/Immunologic: Negative for environmental allergies and food allergies. Neurological: Negative for speech difficulty, weakness and headaches. Hematological: Does not bruise/bleed easily. Psychiatric/Behavioral: Negative for agitation and behavioral problems. Objective:   BP (!) 154/102   Pulse 101   Temp 98.2 °F (36.8 °C)   Ht 5' 1\" (1.549 m)   Wt 201 lb 6.4 oz (91.4 kg)   LMP 09/19/2012   SpO2 98%   Breastfeeding? No   BMI 38.05 kg/m²     Physical Exam   Constitutional: She appears well-developed and well-nourished. She is cooperative. She does not appear ill. Vitals signs reviewed   HENT:   Head: Normocephalic and atraumatic. Right Ear: Hearing and external ear normal.   Left Ear: Hearing and external ear normal.   Nose: No rhinorrhea or nasal deformity. Normal lip contour and movement with conversations   Eyes: Pupils are equal, round, and reactive to light. Conjunctivae, EOM and lids are normal. Right eye exhibits no discharge. Left eye exhibits no discharge. Right conjunctiva has no hemorrhage. Left conjunctiva has no hemorrhage. Right eye exhibits no nystagmus. Left eye exhibits no nystagmus. Neck: Full passive range of motion without pain. Neck supple. Normal carotid pulses and no JVD present. No tracheal tenderness present. Carotid bruit is not present. No tracheal deviation and normal range of motion present. No thyroid mass and no thyromegaly present. Cardiovascular: Normal rate, regular rhythm, S1 normal and S2 normal. PMI displaced: normal location PMI. Exam reveals no gallop, no S3 and no friction rub. No murmur heard. Pulses:       Carotid pulses are 2+ on the right side, and 2+ on the left side. Radial pulses are 2+ on the right side, and 2+ on the left side. Pulmonary/Chest: Effort normal and breath sounds normal. No accessory muscle usage. No respiratory distress. She exhibits no bony tenderness and no deformity. Abdominal: Normal appearance. She exhibits no distension and no ascites.    Musculoskeletal: She exhibits

## 2019-05-22 NOTE — PATIENT INSTRUCTIONS
Today only extra dose of lisinopril. So todays total is 80 mg lisinopril. F/u appt 1-2 weeks with bp cuff to check for accuracy and med dose adjustment.

## 2019-05-29 DIAGNOSIS — Z11.4 SCREENING FOR HIV WITHOUT PRESENCE OF RISK FACTORS: ICD-10-CM

## 2019-05-29 DIAGNOSIS — I10 ESSENTIAL HYPERTENSION: ICD-10-CM

## 2019-05-29 DIAGNOSIS — Z72.89 OTHER PROBLEMS RELATED TO LIFESTYLE: ICD-10-CM

## 2019-05-29 LAB
ANION GAP SERPL CALCULATED.3IONS-SCNC: 14 MEQ/L (ref 9–15)
BUN BLDV-MCNC: 10 MG/DL (ref 6–20)
CALCIUM SERPL-MCNC: 9.5 MG/DL (ref 8.5–9.9)
CHLORIDE BLD-SCNC: 97 MEQ/L (ref 95–107)
CO2: 24 MEQ/L (ref 20–31)
CONTROL: NORMAL
CREAT SERPL-MCNC: 0.55 MG/DL (ref 0.5–0.9)
GFR AFRICAN AMERICAN: >60
GFR NON-AFRICAN AMERICAN: >60
GLUCOSE BLD-MCNC: 80 MG/DL (ref 70–99)
HEMOCCULT STL QL: NORMAL
HEPATITIS C ANTIBODY INTERPRETATION: NORMAL
POTASSIUM SERPL-SCNC: 3.7 MEQ/L (ref 3.4–4.9)
SODIUM BLD-SCNC: 135 MEQ/L (ref 135–144)

## 2019-05-31 DIAGNOSIS — Z12.11 COLON CANCER SCREENING: ICD-10-CM

## 2019-05-31 LAB — HIV 1,2 COMBO ANTIGEN/ANTIBODY: NEGATIVE

## 2019-05-31 PROCEDURE — 82274 ASSAY TEST FOR BLOOD FECAL: CPT | Performed by: FAMILY MEDICINE

## 2019-06-05 ENCOUNTER — OFFICE VISIT (OUTPATIENT)
Dept: FAMILY MEDICINE CLINIC | Age: 59
End: 2019-06-05
Payer: COMMERCIAL

## 2019-06-05 VITALS
WEIGHT: 202 LBS | TEMPERATURE: 97.8 F | SYSTOLIC BLOOD PRESSURE: 168 MMHG | HEART RATE: 102 BPM | OXYGEN SATURATION: 97 % | DIASTOLIC BLOOD PRESSURE: 100 MMHG | HEIGHT: 61 IN | BODY MASS INDEX: 38.14 KG/M2

## 2019-06-05 DIAGNOSIS — G89.29 CHRONIC LOW BACK PAIN, UNSPECIFIED BACK PAIN LATERALITY, WITH SCIATICA PRESENCE UNSPECIFIED: ICD-10-CM

## 2019-06-05 DIAGNOSIS — G25.81 RLS (RESTLESS LEGS SYNDROME): ICD-10-CM

## 2019-06-05 DIAGNOSIS — M54.5 CHRONIC LOW BACK PAIN, UNSPECIFIED BACK PAIN LATERALITY, WITH SCIATICA PRESENCE UNSPECIFIED: ICD-10-CM

## 2019-06-05 DIAGNOSIS — I10 ESSENTIAL HYPERTENSION: ICD-10-CM

## 2019-06-05 PROCEDURE — 99213 OFFICE O/P EST LOW 20 MIN: CPT | Performed by: FAMILY MEDICINE

## 2019-06-05 RX ORDER — LISINOPRIL AND HYDROCHLOROTHIAZIDE 25; 20 MG/1; MG/1
1 TABLET ORAL
Qty: 30 TABLET | Refills: 3 | Status: SHIPPED | OUTPATIENT
Start: 2019-06-05 | End: 2019-06-12 | Stop reason: DRUGHIGH

## 2019-06-05 RX ORDER — PRAMIPEXOLE DIHYDROCHLORIDE 0.12 MG/1
0.12 TABLET ORAL NIGHTLY
Qty: 30 TABLET | Refills: 5 | Status: SHIPPED | OUTPATIENT
Start: 2019-06-05 | End: 2020-08-18 | Stop reason: ALTCHOICE

## 2019-06-05 ASSESSMENT — ENCOUNTER SYMPTOMS
DIARRHEA: 0
TROUBLE SWALLOWING: 0
VOICE CHANGE: 0
ABDOMINAL PAIN: 0
EYE DISCHARGE: 0
NAUSEA: 0
COUGH: 0
SHORTNESS OF BREATH: 0
CONSTIPATION: 0
ABDOMINAL DISTENTION: 0
COLOR CHANGE: 0

## 2019-06-05 ASSESSMENT — PATIENT HEALTH QUESTIONNAIRE - PHQ9
SUM OF ALL RESPONSES TO PHQ QUESTIONS 1-9: 0
1. LITTLE INTEREST OR PLEASURE IN DOING THINGS: 0
SUM OF ALL RESPONSES TO PHQ9 QUESTIONS 1 & 2: 0
2. FEELING DOWN, DEPRESSED OR HOPELESS: 0
SUM OF ALL RESPONSES TO PHQ QUESTIONS 1-9: 0

## 2019-06-05 NOTE — TELEPHONE ENCOUNTER
Spoke to pt states that she only takes this once in a while when her RLS acts up. But not on a regular basis.

## 2019-06-05 NOTE — PROGRESS NOTES
Subjective:      Patient ID: Gia Larkin is a 66 y.o. female who presents for:  Chief Complaint   Patient presents with    2 Week Follow-Up     HTN       Patient feeling well. She forgot to bring her home blood pressure cuff with her. She states the measure pensive been running 170s over 100s. He is tolerating the medication well. No changes otherwise. Current Outpatient Medications on File Prior to Visit   Medication Sig Dispense Refill    DULoxetine (CYMBALTA) 60 MG extended release capsule Take 1 capsule by mouth daily 90 capsule 1    lisinopril (PRINIVIL;ZESTRIL) 40 MG tablet Take 1 tablet by mouth daily Take 1 tablet daily with a 20 mg tablet (total dose 60 mg daily) 30 tablet 5    lisinopril (PRINIVIL;ZESTRIL) 20 MG tablet Take 1 tablet by mouth daily Take one tablet daily with a 40 mg tablet (total dose 60 mg) 30 tablet 5    mupirocin (BACTROBAN) 2 % ointment APPLY TO THE AFFECTED AREA(S) THREE TIMES DAILY 22 g 0    Ciclopirox (LOPROX) 0.77 % gel Apply topically 2 times daily      ciclopirox (PENLAC) 8 % solution Apply nightly to nails, Should wipe off once weekly with alcohol soaked cotton ball. 6.6 mL 5    clindamycin (CLEOCIN T) 1 % lotion Apply topically 2 times daily for 2 weeks 60 mL 0     No current facility-administered medications on file prior to visit. Past Medical History:   Diagnosis Date    Alcoholic fatty liver 28/95/7180    By  Liver U/S 12/09/14    Arthritis     Borderline hyperlipidemia 11/13/2014    has never been on meds    Bunion, right foot     Chronic back pain     Chronic neck pain     Degenerative disc disease, lumbar 11/13/2014    L5-S1    Dermatitis     Elevated liver enzymes 11/13/2014    History of alcohol abuse 12/3/2014    Hypertension     meds > 5 yrs    Hypochloremia 11/13/2014    Hypokalemia     Hyponatremia     Lateral epicondylitis 3/14/2016     Updating Deprecated Diagnoses    Lumbar herniated disc     Obesity (BMI 30-39. 9) 11/6/2014    Pedal edema     Restless leg syndrome     RLS (restless legs syndrome)     Thrombocytosis (HCC)     Tinea pedis 12/16/2014    Toenail fungus     UTI (lower urinary tract infection) 11/19/2014     Past Surgical History:   Procedure Laterality Date    BACK SURGERY  02/27/2012    Dr. Salud Woods / L 4 disc OR    BUNIONECTOMY Right 12/15/2016    [facility] D[facility]  [address] [facility], [place]  02/05/2018    Dr. Colin Greene SPC W/ARTHRD N/A 9/20/2018    L5-S1 PLIF (POSTERIOR LUMBAR INTERBODY FUSION) performed by Jose Reyez MD at [place] [address] 4 disc OR    TONSILLECTOMY       Social History     Socioeconomic History    Marital status:      Spouse name: Not on file    Number of children: 2    Years of education: Not on file    Highest education level: Not on file   Occupational History    Occupation: unemployed   Social Needs    Financial resource strain: Not on file    Food insecurity:     Worry: Not on file     Inability: Not on file   [employer] needs:     Medical: Not on file     Non-medical: Not on file   Tobacco Use    Smoking status: Never Smoker    Smokeless tobacco: Never Used    Tobacco comment: pt states that she smoked maybe 5 cigarettes in  her teens.    Substance and Sexual Activity    Alcohol use: Yes     Comment: social    Drug use: No    Sexual activity: Yes     Partners: Male   Lifestyle    Physical activity:     Days per week: Not on file     Minutes per session: Not on file    Stress: Not on file   Relationships    Social connections:     Talks on phone: Not on file     Gets together: Not on file     Attends [employer] service: Not on file     Active member of club or organization: Not on file     Attends meetings of clubs or organizations: Not on file     Relationship status: Not on file    Intimate partner violence:     Fear of current or ex partner: Not on file     Emotionally abused: Not on file     Physically abused: Not on file     Forced sexual activity: Not on file   Other Topics Concern    Not on file   Social History Narrative    Not on file     Family History   Problem Relation Age of Onset    High Blood Pressure Mother     Arthritis Mother     High Blood Pressure Father     Arthritis Father     High Blood Pressure Sister     Depression Sister     High Blood Pressure Brother     Arthritis Daughter         fibromyalgia     Allergies:  Amlodipine; Lyrica [pregabalin]; and Toprol xl [metoprolol]    Review of Systems   Constitutional: Negative for activity change, appetite change, fatigue and unexpected weight change. HENT: Negative for congestion, dental problem, trouble swallowing and voice change. Eyes: Negative for discharge and visual disturbance. Respiratory: Negative for cough and shortness of breath. Cardiovascular: Negative for chest pain. Gastrointestinal: Negative for abdominal distention, abdominal pain, constipation, diarrhea and nausea. Endocrine: Negative for polydipsia and polyuria. Genitourinary: Negative for dysuria and urgency. Musculoskeletal: Negative for gait problem and joint swelling. Skin: Negative for color change and rash. Allergic/Immunologic: Negative for environmental allergies and food allergies. Neurological: Negative for speech difficulty and weakness. Hematological: Does not bruise/bleed easily. Psychiatric/Behavioral: Negative for agitation and behavioral problems. Objective:   BP (!) 168/100   Pulse 102   Temp 97.8 °F (36.6 °C)   Ht 5' 1\" (1.549 m)   Wt 202 lb (91.6 kg)   LMP 09/19/2012   SpO2 97%   BMI 38.17 kg/m²     Physical Exam   Constitutional: She appears well-developed and well-nourished. No distress. HENT:   Head: Normocephalic and atraumatic.    Right Ear: External ear normal.   Left Ear: External ear normal.   Nose: Nose normal.   Eyes: Pupils are equal, round, and reactive to light. Conjunctivae and EOM are normal. Right eye exhibits no discharge. Left eye exhibits no discharge. Neck: Neck supple. No tracheal deviation present. No thyromegaly present. Cardiovascular: Normal rate and regular rhythm. Pulmonary/Chest: Effort normal. No respiratory distress. Abdominal: She exhibits no distension. Neurological: She is alert. Coordination normal.   Skin: Skin is warm and dry. No bruising and no rash noted. She is not diaphoretic. Psychiatric: She has a normal mood and affect. Judgment and thought content normal.       No results found for this visit on 06/05/19. Recent Results (from the past 2016 hour(s))   POCT Fecal Immunochemical Test (Fit)    Collection Time: 05/29/19 12:00 PM   Result Value Ref Range    OCCULT BLOOD FECAL neg     Control pos    Basic Metabolic Panel    Collection Time: 05/29/19  3:32 PM   Result Value Ref Range    Sodium 135 135 - 144 mEq/L    Potassium 3.7 3.4 - 4.9 mEq/L    Chloride 97 95 - 107 mEq/L    CO2 24 20 - 31 mEq/L    Anion Gap 14 9 - 15 mEq/L    Glucose 80 70 - 99 mg/dL    BUN 10 6 - 20 mg/dL    CREATININE 0.55 0.50 - 0.90 mg/dL    GFR Non-African American >60.0 >60    GFR  >60.0 >60    Calcium 9.5 8.5 - 9.9 mg/dL   Hepatitis C Antibody    Collection Time: 05/29/19  3:32 PM   Result Value Ref Range    Hep C Ab Interp Non-reactive    HIV-1,2 Combo Ag/Ab By [facility], Reflexive Panel    Collection Time: 05/29/19  3:32 PM   Result Value Ref Range    HIV 1,2 Combo Antigen/Antibody Negative Negative           Assessment:       Diagnosis Orders   1. Essential hypertension  lisinopril-hydrochlorothiazide (PRINZIDE;ZESTORETIC) 20-25 MG per tablet         No orders of the defined types were placed in this encounter. Plan:   Return in about 1 week (around 6/12/2019). Patient Instructions   Lisinopril 40 mg to be taken at night. Lisinopril/ HCTZ  20/25 to be taken in the morning.       Lisinopril 20 mg per serving). Foods labeled \"reduced-sodium\" and \"light sodium\" may still have too much sodium. Be sure to read the label to see how much sodium you are getting. · Buy fresh vegetables, or frozen vegetables without added sauces. Buy low-sodium versions of canned vegetables, soups, and other canned goods. Prepare low-sodium meals  · Cut back on the amount of salt you use in cooking. This will help you adjust to the taste. Do not add salt after cooking. One teaspoon of salt has about 2,300 mg of sodium. · Take the salt shaker off the table. · Flavor your food with garlic, lemon juice, onion, vinegar, herbs, and spices. Do not use soy sauce, lite soy sauce, steak sauce, onion salt, garlic salt, celery salt, mustard, or ketchup on your food. · Use low-sodium salad dressings, sauces, and ketchup. Or make your own salad dressings and sauces without adding salt. · Use less salt (or none) when recipes call for it. You can often use half the salt a recipe calls for without losing flavor. Other foods such as rice, pasta, and grains do not need added salt. · Rinse canned vegetables, and cook them in fresh water. This removes some--but not all--of the salt. · Avoid water that is naturally high in sodium or that has been treated with water softeners, which add sodium. Call your local water company to find out the sodium content of your water supply. If you buy bottled water, read the label and choose a sodium-free brand. Avoid high-sodium foods  · Avoid eating:  ? Smoked, cured, salted, and canned meat, fish, and poultry. ? Ham, flynn, hot dogs, and luncheon meats. ? Regular, hard, and processed cheese and regular peanut butter. ? Crackers with salted tops, and other salted snack foods such as pretzels, chips, and salted popcorn. ? Frozen prepared meals, unless labeled low-sodium. ? Canned and dried soups, broths, and bouillon, unless labeled sodium-free or low-sodium. ?  Canned vegetables, unless labeled sodium-free or low-sodium. ? [employer] fries, pizza, tacos, and other fast foods. ? Pickles, olives, ketchup, and other condiments, especially soy sauce, unless labeled sodium-free or low-sodium. Where can you learn more? Go to [email or web address] and sign in to your [employer] ([employer]) account. Enter U557 in the [employer] box to learn more about \"Low Sodium Diet (2,000 Milligram): Care Instructions. \"     If you do not have an account, please click on the \"Sign Up Now\" link. Current as of: November 7, 2018  Content Version: 12.0  © 3957-2593 [employer]. Care instructions adapted under license by [facility] ([facility]). If you have questions about a medical condition or this instruction, always ask your healthcare professional. [address] any warranty or liability for your use of this information. Brandt Wilson M.D.

## 2019-06-05 NOTE — TELEPHONE ENCOUNTER
Refill sent. Please educate the patient this medication works best at least episodically. Meaning if her restless leg is acting up she should plan on taking nightly for a week or 2.

## 2019-06-05 NOTE — PATIENT INSTRUCTIONS
\"unsalted\" (no salt added), \"sodium-free\" (less than 5 mg of sodium per serving), or \"low-sodium\" (less than 140 mg of sodium per serving). Foods labeled \"reduced-sodium\" and \"light sodium\" may still have too much sodium. Be sure to read the label to see how much sodium you are getting. · Buy fresh vegetables, or frozen vegetables without added sauces. Buy low-sodium versions of canned vegetables, soups, and other canned goods. Prepare low-sodium meals  · Cut back on the amount of salt you use in cooking. This will help you adjust to the taste. Do not add salt after cooking. One teaspoon of salt has about 2,300 mg of sodium. · Take the salt shaker off the table. · Flavor your food with garlic, lemon juice, onion, vinegar, herbs, and spices. Do not use soy sauce, lite soy sauce, steak sauce, onion salt, garlic salt, celery salt, mustard, or ketchup on your food. · Use low-sodium salad dressings, sauces, and ketchup. Or make your own salad dressings and sauces without adding salt. · Use less salt (or none) when recipes call for it. You can often use half the salt a recipe calls for without losing flavor. Other foods such as rice, pasta, and grains do not need added salt. · Rinse canned vegetables, and cook them in fresh water. This removes some--but not all--of the salt. · Avoid water that is naturally high in sodium or that has been treated with water softeners, which add sodium. Call your local water company to find out the sodium content of your water supply. If you buy bottled water, read the label and choose a sodium-free brand. Avoid high-sodium foods  · Avoid eating:  ? Smoked, cured, salted, and canned meat, fish, and poultry. ? Ham, flynn, hot dogs, and luncheon meats. ? Regular, hard, and processed cheese and regular peanut butter. ? Crackers with salted tops, and other salted snack foods such as pretzels, chips, and salted popcorn. ? Frozen prepared meals, unless labeled low-sodium. ?  Canned and dried soups, broths, and bouillon, unless labeled sodium-free or low-sodium. ? Canned vegetables, unless labeled sodium-free or low-sodium. ? Western Angi fries, pizza, tacos, and other fast foods. ? Pickles, olives, ketchup, and other condiments, especially soy sauce, unless labeled sodium-free or low-sodium. Where can you learn more? Go to https://PasswordBankpeAqwiseeb.Citrus. org and sign in to your FreeBrie account. Enter G563 in the infoBizz box to learn more about \"Low Sodium Diet (2,000 Milligram): Care Instructions. \"     If you do not have an account, please click on the \"Sign Up Now\" link. Current as of: November 7, 2018  Content Version: 12.0  © 7344-7279 Healthwise, Incorporated. Care instructions adapted under license by [facility] ([facility]). If you have questions about a medical condition or this instruction, always ask your healthcare professional. [address] any warranty or liability for your use of this information.

## 2019-06-12 ENCOUNTER — OFFICE VISIT (OUTPATIENT)
Dept: FAMILY MEDICINE CLINIC | Age: 59
End: 2019-06-12
Payer: COMMERCIAL

## 2019-06-12 VITALS
HEIGHT: 61 IN | WEIGHT: 202 LBS | BODY MASS INDEX: 38.14 KG/M2 | SYSTOLIC BLOOD PRESSURE: 155 MMHG | OXYGEN SATURATION: 98 % | TEMPERATURE: 98.1 F | DIASTOLIC BLOOD PRESSURE: 92 MMHG | HEART RATE: 96 BPM

## 2019-06-12 DIAGNOSIS — I10 ESSENTIAL HYPERTENSION: Primary | ICD-10-CM

## 2019-06-12 DIAGNOSIS — M62.838 MUSCLE SPASM: ICD-10-CM

## 2019-06-12 PROCEDURE — 99214 OFFICE O/P EST MOD 30 MIN: CPT | Performed by: FAMILY MEDICINE

## 2019-06-12 RX ORDER — HYDROCHLOROTHIAZIDE 25 MG/1
25 TABLET ORAL EVERY MORNING
Qty: 30 TABLET | Refills: 5 | Status: SHIPPED | OUTPATIENT
Start: 2019-06-12 | End: 2019-12-30

## 2019-06-12 RX ORDER — LISINOPRIL 40 MG/1
40 TABLET ORAL 2 TIMES DAILY
Qty: 60 TABLET | Refills: 5 | Status: SHIPPED | OUTPATIENT
Start: 2019-06-12 | End: 2019-12-30

## 2019-06-12 RX ORDER — TIZANIDINE 4 MG/1
4 TABLET ORAL EVERY 8 HOURS PRN
Qty: 60 TABLET | Refills: 1 | Status: SHIPPED | OUTPATIENT
Start: 2019-06-12 | End: 2020-03-23

## 2019-06-12 ASSESSMENT — ENCOUNTER SYMPTOMS
VOICE CHANGE: 0
SHORTNESS OF BREATH: 0
DIARRHEA: 0
BACK PAIN: 1
TROUBLE SWALLOWING: 0
COUGH: 0
ABDOMINAL DISTENTION: 0
CONSTIPATION: 0
ABDOMINAL PAIN: 0
EYE DISCHARGE: 0
NAUSEA: 0
COLOR CHANGE: 0

## 2019-06-12 NOTE — PROGRESS NOTES
Subjective:      Patient ID: Mary Gomez is a 66 y.o. female who presents for:  Chief Complaint   Patient presents with    Check-Up     follow up 1 week- HTN    Other     pulled back in muscle 2 weeks ago in back, and would like something for this if possible. Patient here for second follow-up of blood pressure medication adjustment. Improving response. She brought her home blood pressure monitor with her today. It's reading the same as our blood pressure cuff for systolic but it's reading about 10 points higher for the diastolic. We discussed how to keep track of this at home. Current Outpatient Medications on File Prior to Visit   Medication Sig Dispense Refill    pramipexole (MIRAPEX) 0.125 MG tablet Take 1 tablet by mouth nightly 30 tablet 5    DULoxetine (CYMBALTA) 60 MG extended release capsule Take 1 capsule by mouth daily 90 capsule 1    mupirocin (BACTROBAN) 2 % ointment APPLY TO THE AFFECTED AREA(S) THREE TIMES DAILY 22 g 0    Ciclopirox (LOPROX) 0.77 % gel Apply topically 2 times daily      ciclopirox (PENLAC) 8 % solution Apply nightly to nails, Should wipe off once weekly with alcohol soaked cotton ball. 6.6 mL 5    clindamycin (CLEOCIN T) 1 % lotion Apply topically 2 times daily for 2 weeks 60 mL 0     No current facility-administered medications on file prior to visit.       Past Medical History:   Diagnosis Date    Alcoholic fatty liver 74/28/1939    By  Liver U/S 12/09/14    Arthritis     Borderline hyperlipidemia 11/13/2014    has never been on meds    Bunion, right foot     Chronic back pain     Chronic neck pain     Degenerative disc disease, lumbar 11/13/2014    L5-S1    Dermatitis     Elevated liver enzymes 11/13/2014    History of alcohol abuse 12/3/2014    Hypertension     meds > 5 yrs    Hypochloremia 11/13/2014    Hypokalemia     Hyponatremia     Lateral epicondylitis 3/14/2016     Updating Deprecated Diagnoses    Lumbar herniated disc     Obesity (BMI 30-39.9) 11/6/2014    Pedal edema     Restless leg syndrome     RLS (restless legs syndrome)     Thrombocytosis (HCC)     Tinea pedis 12/16/2014    Toenail fungus     UTI (lower urinary tract infection) 11/19/2014     Past Surgical History:   Procedure Laterality Date    BACK SURGERY  02/27/2012    Dr. Blaze Shafer / L 4 disc OR    BUNIONECTOMY Right 12/15/2016    [facility]  [address] [facility], LAPAROSCOPIC  02/05/2018    Dr. Haley Bartlett SPC W/ARTHRD N/A 9/20/2018    L5-S1 PLIF (POSTERIOR LUMBAR INTERBODY FUSION) performed by Ayanna Lobato MD at [place] [id number] 4 disc OR    TONSILLECTOMY       Social History     Socioeconomic History    Marital status:      Spouse name: Not on file    Number of children: 2    Years of education: Not on file    Highest education level: Not on file   Occupational History    Occupation: unemployed   Social Needs    Financial resource strain: Not on file    Food insecurity:     Worry: Not on file     Inability: Not on file   [employer] needs:     Medical: Not on file     Non-medical: Not on file   Tobacco Use    Smoking status: Never Smoker    Smokeless tobacco: Never Used    Tobacco comment: pt states that she smoked maybe 5 cigarettes in  her teens.    Substance and Sexual Activity    Alcohol use: Yes     Comment: social    Drug use: No    Sexual activity: Yes     Partners: Male   Lifestyle    Physical activity:     Days per week: Not on file     Minutes per session: Not on file    Stress: Not on file   Relationships    Social connections:     Talks on phone: Not on file     Gets together: Not on file     Attends Yazdanism service: Not on file     Active member of club or organization: Not on file     Attends meetings of clubs or organizations: Not on file     Relationship status: Not on file    Intimate partner violence:     Fear of current or ex partner: Not on file     Emotionally abused: Not on file     Physically abused: Not on file     Forced sexual activity: Not on file   Other Topics Concern    Not on file   Social History Narrative    Not on file     Family History   Problem Relation Age of Onset    High Blood Pressure Mother     Arthritis Mother     High Blood Pressure Father     Arthritis Father     High Blood Pressure Sister     Depression Sister     High Blood Pressure Brother     Arthritis Daughter         fibromyalgia     Allergies:  Amlodipine; Lyrica [pregabalin]; and Toprol xl [metoprolol]    Review of Systems   Constitutional: Negative for activity change, appetite change, fatigue and unexpected weight change. HENT: Negative for congestion, dental problem, trouble swallowing and voice change. Eyes: Negative for discharge and visual disturbance. Respiratory: Negative for cough and shortness of breath. Cardiovascular: Negative for chest pain. Gastrointestinal: Negative for abdominal distention, abdominal pain, constipation, diarrhea and nausea. Endocrine: Negative for polydipsia and polyuria. Genitourinary: Negative for dysuria and urgency. Musculoskeletal: Positive for back pain. Negative for gait problem and joint swelling. Pulled a muscle lifting a chair at home. Skin: Negative for color change and rash. Allergic/Immunologic: Negative for environmental allergies and food allergies. Neurological: Negative for speech difficulty and weakness. Hematological: Does not bruise/bleed easily. Psychiatric/Behavioral: Negative for agitation and behavioral problems. Objective:   BP (!) 155/92 Comment: home cuff  Pulse 96   Temp 98.1 °F (36.7 °C)   Ht 5' 1\" (1.549 m)   Wt 202 lb (91.6 kg)   LMP 09/19/2012   SpO2 98%   BMI 38.17 kg/m²     Physical Exam   Constitutional: She appears well-developed and well-nourished. No distress. HENT:   Head: Normocephalic and atraumatic.    Right Ear: External ear normal.   Left Ear: External ear normal.   Nose: Nose normal.   Eyes: Pupils are equal, round, and reactive to light. Conjunctivae and EOM are normal. Right eye exhibits no discharge. Left eye exhibits no discharge. Neck: Neck supple. No tracheal deviation present. No thyromegaly present. Cardiovascular: Normal rate and regular rhythm. Pulmonary/Chest: Effort normal. No respiratory distress. Abdominal: She exhibits no distension. Neurological: She is alert. Coordination normal.   Skin: Skin is warm and dry. No bruising and no rash noted. She is not diaphoretic. Psychiatric: She has a normal mood and affect. Judgment and thought content normal.       No results found for this visit on 06/12/19. Recent Results (from the past 2016 hour(s))   POCT Fecal Immunochemical Test (Fit)    Collection Time: 05/29/19 12:00 PM   Result Value Ref Range    OCCULT BLOOD FECAL neg     Control pos    Basic Metabolic Panel    Collection Time: 05/29/19  3:32 PM   Result Value Ref Range    Sodium 135 135 - 144 mEq/L    Potassium 3.7 3.4 - 4.9 mEq/L    Chloride 97 95 - 107 mEq/L    CO2 24 20 - 31 mEq/L    Anion Gap 14 9 - 15 mEq/L    Glucose 80 70 - 99 mg/dL    BUN 10 6 - 20 mg/dL    CREATININE 0.55 0.50 - 0.90 mg/dL    GFR Non-African American >60.0 >60    GFR  >60.0 >60    Calcium 9.5 8.5 - 9.9 mg/dL   Hepatitis C Antibody    Collection Time: 05/29/19  3:32 PM   Result Value Ref Range    Hep C Ab Interp Non-reactive    HIV-1,2 Combo Ag/Ab By UMESH, Reflexive Panel    Collection Time: 05/29/19  3:32 PM   Result Value Ref Range    HIV 1,2 Combo Antigen/Antibody Negative Negative           Assessment:       Diagnosis Orders   1. Essential hypertension  lisinopril (PRINIVIL;ZESTRIL) 40 MG tablet   2. Muscle spasm  tiZANidine (ZANAFLEX) 4 MG tablet         No orders of the defined types were placed in this encounter.         Plan:   Return in about 3 months (around 9/12/2019) for for routine major medical condition management. Patient Instructions   Changing lisinopril to 40 mg twice daily since pt tolerates this medication and hydrochlorothiazide 25 mg every morning. if bp is increasingly greater than 160/95  Return to office sooner. Patient Education        Therapeutic Ball: Back Exercises  Your Care Instructions  Here are some examples of typical exercises for your condition. Start each exercise slowly. Ease off the exercise if you start to have pain. Your doctor or physical therapist will tell you when you can start these exercises and which ones will work best for you. To prepare, make sure that your ball is the right size for you. When inflated and firm, it should allow you to sit with your hips and knees bent at about a 90-degree angle (like the letter L). How to do the exercises  Seated position on ball    1. Use this exercise to get used to moving on the ball and to find your best sitting position. 2. Sit comfortably on the ball with your feet about hip-width apart. If you feel unsteady, rest your hands on the ball near your hips. 3. As you do this exercise, try to keep your shoulders and upper body relaxed and still. 4. Using your stomach and back muscles to move your pelvis, roll the ball forward. This will round your back. 5. Still using your stomach and back muscles, roll the ball back. You will arch your back. 6. Repeat this rounding-arching motion a few times. 7. Stop in between the two positions, where your back is not rounded or arched. This is called your neutral position. Pelvic rotation    1. Sit tall on the ball. 2. Slowly rotate your hips in a [place] pattern. Keep the movement focused at your hips. 3. Repeat, but [place] in the other direction. 4. Repeat 8 to 12 times. Postural sitting    1. Use this position to find a stable, relaxed posture on the ball. You can use this position as your starting point for other ball exercises.  If you feel unsteady on the ball, start on a chair first.  2. Sit on a ball or chair, with your feet planted straight in front of you. 3. Imagine that a string at the top of your head is pulling you straight up. Think of yourself as 2 inches taller than you are.  4. Slightly tuck your chin. 5. Keep your shoulders back and relaxed. Knee extension    1. Sit tall on the ball with your feet planted in front of you, hip-width apart. As you do this exercise, avoid slumping your shoulders and arching your back. 2. Rest your hands on the ball near your hip or a steady object next to you. (If you feel very stable on the ball, rest your hands in your lap or at your side.)  3. Slowly straighten one leg at the knee. Slowly lower it back down. Repeat with the other leg. 4. Repeat this exercise 8 to 12 times. Roll-ups    1. Lie on your back with your knees bent, feet resting on the floor. 2. Lay the ball on your thighs. Rest your hands up high on the ball. 3. Raising your head and shoulder blades, roll the ball up your thighs. Exhale as you roll up. 4. If this is hard on your neck, gently support your lower head and upper neck with one hand. Don't use that hand to pull your head up. 5. Repeat 8 to 12 times. Ball curls    1. Lie on your back with your ankles resting on the ball, knees straight. 2. Use your legs to roll the exercise ball toward you. Allow your knees to bend and move closer to your chest.  3. Pause briefly, and then roll the ball to the starting position. Try to keep the ball rolling straight. You will feel the muscles in your lower belly working. 4. Repeat 8 to 12 times. Bridge with ball under legs    1. Lie on your back with your legs up, calves resting on the ball. For more challenge, rest your heels on the ball. 2. Look up at the ceiling, and keep your chin relaxed. You can place a small pillow under your head or neck for comfort.   3. With your arms by your side, press your hands onto the floor for stability. 4. Tighten your belly muscles by pulling in your belly button toward your spine. 5. Push your heels down toward the floor, squeeze your buttocks, and lift your hips off the floor until your shoulders, hips, and knees are all in a straight line. 6. Try to keep the ball steady. Hold for about 6 seconds as you continue to breathe normally. 7. Slowly lower your hips back down to the floor. 8. Repeat 8 to 12 times. Ball curls with bridge    1. Start flat on your back with your ankles resting on the ball. 2. Look up at the ceiling, and keep your chin relaxed. You can place a small pillow under your head or neck for comfort. 3. With your arms by your side, press your hands onto the floor for stability. 4. Tighten your belly muscles by pulling in your belly button toward your spine. 5. Push your heels down toward the floor, squeeze your buttocks, and lift your hips off the floor until your shoulders, hips, and knees are all in a straight line. 6. While holding the bridge position, roll the ball toward you with your heels. Keep your hips as level as you can. 7. Pause briefly, and then roll the ball back out. Try to keep the ball rolling straight. You will feel the muscles in your lower belly working as you straighten your legs. 8. Lower your hips, and return to your starting position. 9. Repeat 8 to 12 times. 10. When you can keep your body and the ball steady throughout this exercise, you're ready for more challenge. Try keeping your hips raised while rolling the ball out, holding the bridge, and rolling back, a few times in a row. Praying klaus    1. Kneel upright with the ball in front of you. 2. To start, clasp your hands together. Rest them on the ball in front of you. 3. As you do this exercise, keep your back and hips straight and tighten your belly and buttocks muscles. Keep your knees in place. 4. Press on the ball with your arms. Lean forward from the knees.  This rolls the ball forward. You will bear most of your weight on your arms. 5. If your back starts to ache, you've gone too far. Pull back a bit. 6. Roll back to the start position. 7. Repeat 8 to 12 times. Walk-out plank on ball    1. Kneel over the ball. Place your hands on the floor in front of you. 2. Walk your hands forward until your legs are straight on the ball. This is the plank position. 3. When in plank position, hold your body straight and tighten your belly and buttocks muscles. Keep your chin slightly tucked. 4. Roll as far forward as you can without losing your balance or letting your hips drop. You may stop with the ball under your thighs, or even under your knees or shins. 5. Hold a few seconds, then walk your hands back and return to the start position. 6. Repeat 8 to 12 times. Push-up with thighs on ball    1. Kneel over the ball. Place your hands on the floor in front of you. 2. Walk your hands forward until your legs are straight on the ball. This is the plank position. 3. When in plank position, hold your body straight and tighten your belly and buttocks muscles. Keep your chin slightly tucked. 4. Roll as far forward as you can without losing your balance or letting your hips drop. You may stop with the ball under your thighs, or even under your knees or shins. 5. Bend your elbows. Slowly lower your body toward the ground as far as you can without losing your balance. 6. If your wrists hurt, try moving your hands a little farther apart so they're not right under your shoulders. 7. Slowly straighten your arms. 8. Do 8 to 12 of these push-ups. Wall squat with ball    1. Stand facing away from a wall. Place your feet about shoulder-width apart. 2. Place the ball between your middle back and the wall. Move your feet out in front of you so they are about a foot in front of your hips. 3. Keep your arms at your sides, or put your hands on your hips.   4. Slowly squat down as if you are going to sit in a chair, rolling your back over the ball as you squat. The ball should move with you but stay pressed into the wall. 5. Be sure that your knees do not go in front of your toes as you squat. 6. Hold for 6 seconds. 7. Slowly rise to your standing position. 8. Repeat 8 to 12 times. Child's pose with ball    1. Kneeling upright with your back straight, rest your hands on the ball in front of you. 2. Breathe out as you bend at the hips, and roll the ball forward. Lower your chest toward the ground, and drop your hips back toward your heels. 3. To stretch your upper back and shoulders, hold this position for 15 to 30 seconds. 4. Repeat 2 to 4 times. Follow-up care is a key part of your treatment and safety. Be sure to make and go to all appointments, and call your doctor if you are having problems. It's also a good idea to know your test results and keep a list of the medicines you take. Where can you learn more? Go to https://[employer] Technologies.Navajo Systems. org and sign in to your InfiKno account. Enter S269 in the [employer] box to learn more about \"Therapeutic Ball: Back Exercises. \"     If you do not have an account, please click on the \"Sign Up Now\" link. Current as of: September 20, 2018  Content Version: 12.0  © 2404-8426 Healthwise, Incorporated. Care instructions adapted under license by [facility] ([facility]). If you have questions about a medical condition or this instruction, always ask your healthcare professional. [place] [address] any warranty or liability for your use of this information. Brandt Wolfe M.D.

## 2019-06-12 NOTE — PATIENT INSTRUCTIONS
Changing lisinopril to 40 mg twice daily since pt tolerates this medication and hydrochlorothiazide 25 mg every morning. if bp is increasingly greater than 160/95  Return to office sooner. Patient Education        Therapeutic Ball: Back Exercises  Your Care Instructions  Here are some examples of typical exercises for your condition. Start each exercise slowly. Ease off the exercise if you start to have pain. Your doctor or physical therapist will tell you when you can start these exercises and which ones will work best for you. To prepare, make sure that your ball is the right size for you. When inflated and firm, it should allow you to sit with your hips and knees bent at about a 90-degree angle (like the letter L). How to do the exercises  Seated position on ball    1. Use this exercise to get used to moving on the ball and to find your best sitting position. 2. Sit comfortably on the ball with your feet about hip-width apart. If you feel unsteady, rest your hands on the ball near your hips. 3. As you do this exercise, try to keep your shoulders and upper body relaxed and still. 4. Using your stomach and back muscles to move your pelvis, roll the ball forward. This will round your back. 5. Still using your stomach and back muscles, roll the ball back. You will arch your back. 6. Repeat this rounding-arching motion a few times. 7. Stop in between the two positions, where your back is not rounded or arched. This is called your neutral position. Pelvic rotation    1. Sit tall on the ball. 2. Slowly rotate your hips in a Hamilton pattern. Keep the movement focused at your hips. 3. Repeat, but Hamilton in the other direction. 4. Repeat 8 to 12 times. Postural sitting    1. Use this position to find a stable, relaxed posture on the ball. You can use this position as your starting point for other ball exercises.  If you feel unsteady on the ball, start on a chair first.  2. Sit on a ball or chair, with your feet planted straight in front of you. 3. Imagine that a string at the top of your head is pulling you straight up. Think of yourself as 2 inches taller than you are.  4. Slightly tuck your chin. 5. Keep your shoulders back and relaxed. Knee extension    1. Sit tall on the ball with your feet planted in front of you, hip-width apart. As you do this exercise, avoid slumping your shoulders and arching your back. 2. Rest your hands on the ball near your hip or a steady object next to you. (If you feel very stable on the ball, rest your hands in your lap or at your side.)  3. Slowly straighten one leg at the knee. Slowly lower it back down. Repeat with the other leg. 4. Repeat this exercise 8 to 12 times. Roll-ups    1. Lie on your back with your knees bent, feet resting on the floor. 2. Lay the ball on your thighs. Rest your hands up high on the ball. 3. Raising your head and shoulder blades, roll the ball up your thighs. Exhale as you roll up. 4. If this is hard on your neck, gently support your lower head and upper neck with one hand. Don't use that hand to pull your head up. 5. Repeat 8 to 12 times. Ball curls    1. Lie on your back with your ankles resting on the ball, knees straight. 2. Use your legs to roll the exercise ball toward you. Allow your knees to bend and move closer to your chest.  3. Pause briefly, and then roll the ball to the starting position. Try to keep the ball rolling straight. You will feel the muscles in your lower belly working. 4. Repeat 8 to 12 times. Bridge with ball under legs    1. Lie on your back with your legs up, calves resting on the ball. For more challenge, rest your heels on the ball. 2. Look up at the ceiling, and keep your chin relaxed. You can place a small pillow under your head or neck for comfort. 3. With your arms by your side, press your hands onto the floor for stability.   4. Tighten your belly muscles by pulling in your belly button toward your spine. 5. Push your heels down toward the floor, squeeze your buttocks, and lift your hips off the floor until your shoulders, hips, and knees are all in a straight line. 6. Try to keep the ball steady. Hold for about 6 seconds as you continue to breathe normally. 7. Slowly lower your hips back down to the floor. 8. Repeat 8 to 12 times. Ball curls with bridge    1. Start flat on your back with your ankles resting on the ball. 2. Look up at the ceiling, and keep your chin relaxed. You can place a small pillow under your head or neck for comfort. 3. With your arms by your side, press your hands onto the floor for stability. 4. Tighten your belly muscles by pulling in your belly button toward your spine. 5. Push your heels down toward the floor, squeeze your buttocks, and lift your hips off the floor until your shoulders, hips, and knees are all in a straight line. 6. While holding the bridge position, roll the ball toward you with your heels. Keep your hips as level as you can. 7. Pause briefly, and then roll the ball back out. Try to keep the ball rolling straight. You will feel the muscles in your lower belly working as you straighten your legs. 8. Lower your hips, and return to your starting position. 9. Repeat 8 to 12 times. 10. When you can keep your body and the ball steady throughout this exercise, you're ready for more challenge. Try keeping your hips raised while rolling the ball out, holding the bridge, and rolling back, a few times in a row. Praying klaus    1. Kneel upright with the ball in front of you. 2. To start, clasp your hands together. Rest them on the ball in front of you. 3. As you do this exercise, keep your back and hips straight and tighten your belly and buttocks muscles. Keep your knees in place. 4. Press on the ball with your arms. Lean forward from the knees. This rolls the ball forward. You will bear most of your weight on your arms.   5. If your back starts to ache, you've gone too far. Pull back a bit. 6. Roll back to the start position. 7. Repeat 8 to 12 times. Walk-out plank on ball    1. Kneel over the ball. Place your hands on the floor in front of you. 2. Walk your hands forward until your legs are straight on the ball. This is the plank position. 3. When in plank position, hold your body straight and tighten your belly and buttocks muscles. Keep your chin slightly tucked. 4. Roll as far forward as you can without losing your balance or letting your hips drop. You may stop with the ball under your thighs, or even under your knees or shins. 5. Hold a few seconds, then walk your hands back and return to the start position. 6. Repeat 8 to 12 times. Push-up with thighs on ball    1. Kneel over the ball. Place your hands on the floor in front of you. 2. Walk your hands forward until your legs are straight on the ball. This is the plank position. 3. When in plank position, hold your body straight and tighten your belly and buttocks muscles. Keep your chin slightly tucked. 4. Roll as far forward as you can without losing your balance or letting your hips drop. You may stop with the ball under your thighs, or even under your knees or shins. 5. Bend your elbows. Slowly lower your body toward the ground as far as you can without losing your balance. 6. If your wrists hurt, try moving your hands a little farther apart so they're not right under your shoulders. 7. Slowly straighten your arms. 8. Do 8 to 12 of these push-ups. Wall squat with ball    1. Stand facing away from a wall. Place your feet about shoulder-width apart. 2. Place the ball between your middle back and the wall. Move your feet out in front of you so they are about a foot in front of your hips. 3. Keep your arms at your sides, or put your hands on your hips. 4. Slowly squat down as if you are going to sit in a chair, rolling your back over the ball as you squat.  The ball should move with you but stay pressed into the wall. 5. Be sure that your knees do not go in front of your toes as you squat. 6. Hold for 6 seconds. 7. Slowly rise to your standing position. 8. Repeat 8 to 12 times. Child's pose with ball    1. Kneeling upright with your back straight, rest your hands on the ball in front of you. 2. Breathe out as you bend at the hips, and roll the ball forward. Lower your chest toward the ground, and drop your hips back toward your heels. 3. To stretch your upper back and shoulders, hold this position for 15 to 30 seconds. 4. Repeat 2 to 4 times. Follow-up care is a key part of your treatment and safety. Be sure to make and go to all appointments, and call your doctor if you are having problems. It's also a good idea to know your test results and keep a list of the medicines you take. Where can you learn more? Go to https://[employer]. org and sign in to your [employer] account. Enter J685 in the [employer] box to learn more about \"Therapeutic Ball: Back Exercises. \"     If you do not have an account, please click on the \"Sign Up Now\" link. Current as of: September 20, 2018  Content Version: 12.0  © 3162-0960 Healthwise, Incorporated. Care instructions adapted under license by [facility] ([facility]). If you have questions about a medical condition or this instruction, always ask your healthcare professional. [place] [address] any warranty or liability for your use of this information.

## 2019-11-29 DIAGNOSIS — M54.50 CHRONIC LOW BACK PAIN: ICD-10-CM

## 2019-11-29 DIAGNOSIS — G89.29 CHRONIC LOW BACK PAIN: ICD-10-CM

## 2019-11-29 RX ORDER — DULOXETIN HYDROCHLORIDE 60 MG/1
60 CAPSULE, DELAYED RELEASE ORAL DAILY
Qty: 90 CAPSULE | Refills: 1 | Status: SHIPPED | OUTPATIENT
Start: 2019-11-29 | End: 2020-05-21 | Stop reason: SDUPTHER

## 2019-12-29 DIAGNOSIS — I10 ESSENTIAL HYPERTENSION: ICD-10-CM

## 2019-12-30 RX ORDER — HYDROCHLOROTHIAZIDE 25 MG/1
25 TABLET ORAL EVERY MORNING
Qty: 30 TABLET | Refills: 5 | Status: SHIPPED | OUTPATIENT
Start: 2019-12-30 | End: 2020-07-09

## 2019-12-30 RX ORDER — LISINOPRIL 40 MG/1
TABLET ORAL
Qty: 60 TABLET | Refills: 5 | Status: SHIPPED | OUTPATIENT
Start: 2019-12-30 | End: 2020-07-09

## 2020-03-23 RX ORDER — TIZANIDINE 4 MG/1
4 TABLET ORAL EVERY 8 HOURS PRN
Qty: 60 TABLET | Refills: 1 | Status: SHIPPED | OUTPATIENT
Start: 2020-03-23 | End: 2020-06-07

## 2020-05-22 RX ORDER — DULOXETIN HYDROCHLORIDE 60 MG/1
60 CAPSULE, DELAYED RELEASE ORAL DAILY
Qty: 30 CAPSULE | Refills: 0 | Status: SHIPPED | OUTPATIENT
Start: 2020-05-22 | End: 2020-07-17

## 2020-06-12 NOTE — TELEPHONE ENCOUNTER
I am not finding evidence of a twice a day recommendation for this. And she has not been seen since 2019 June. I do not know what happened.   Continue only 30 days of what ever dose she had used before and the patient needs to get in for appointment

## 2020-07-06 ENCOUNTER — TELEPHONE (OUTPATIENT)
Dept: FAMILY MEDICINE CLINIC | Age: 60
End: 2020-07-06

## 2020-07-06 NOTE — TELEPHONE ENCOUNTER
Pt has appt for July 21st, but needs her Lisinopril 40 mg twice a day, and  HCTZ 25 mg one daily called to [facility] she will be out of these on July 7th.

## 2020-07-09 RX ORDER — LISINOPRIL 40 MG/1
TABLET ORAL
Qty: 60 TABLET | Refills: 5 | Status: SHIPPED | OUTPATIENT
Start: 2020-07-09 | End: 2021-01-11

## 2020-07-09 RX ORDER — HYDROCHLOROTHIAZIDE 25 MG/1
25 TABLET ORAL EVERY MORNING
Qty: 30 TABLET | Refills: 5 | Status: SHIPPED | OUTPATIENT
Start: 2020-07-09 | End: 2021-01-06

## 2020-07-17 RX ORDER — DULOXETIN HYDROCHLORIDE 60 MG/1
60 CAPSULE, DELAYED RELEASE ORAL DAILY
Qty: 30 CAPSULE | Refills: 0 | Status: SHIPPED | OUTPATIENT
Start: 2020-07-17 | End: 2020-08-17

## 2020-07-21 ENCOUNTER — VIRTUAL VISIT (OUTPATIENT)
Dept: FAMILY MEDICINE CLINIC | Age: 60
End: 2020-07-21
Payer: COMMERCIAL

## 2020-07-21 PROCEDURE — 99213 OFFICE O/P EST LOW 20 MIN: CPT | Performed by: FAMILY MEDICINE

## 2020-07-21 ASSESSMENT — ENCOUNTER SYMPTOMS
ABDOMINAL DISTENTION: 0
ABDOMINAL PAIN: 0
CHEST TIGHTNESS: 0
SHORTNESS OF BREATH: 0
PHOTOPHOBIA: 0

## 2020-07-21 NOTE — PROGRESS NOTES
60 MG extended release capsule TAKE 1 CAPSULE BY MOUTH DAILY 30 capsule 0    lisinopril (PRINIVIL;ZESTRIL) 40 MG tablet TAKE 1 TABLET BY MOUTH TWICE DAILY 60 tablet 5    hydroCHLOROthiazide (HYDRODIURIL) 25 MG tablet Take 1 tablet by mouth every morning 30 tablet 5    pramipexole (MIRAPEX) 0.125 MG tablet Take 1 tablet by mouth nightly 30 tablet 5    mupirocin (BACTROBAN) 2 % ointment APPLY TO THE AFFECTED AREA(S) THREE TIMES DAILY 22 g 0    Ciclopirox (LOPROX) 0.77 % gel Apply topically 2 times daily      ciclopirox (PENLAC) 8 % solution Apply nightly to nails, Should wipe off once weekly with alcohol soaked cotton ball. 6.6 mL 5    clindamycin (CLEOCIN T) 1 % lotion Apply topically 2 times daily for 2 weeks 60 mL 0     No current facility-administered medications on file prior to visit.       Past Medical History:   Diagnosis Date    Alcoholic fatty liver 29/13/7976    By  Liver U/S 12/09/14    Arthritis     Borderline hyperlipidemia 11/13/2014    has never been on meds    Bunion, right foot     Chronic back pain     Chronic neck pain     Degenerative disc disease, lumbar 11/13/2014    L5-S1    Dermatitis     Elevated liver enzymes 11/13/2014    History of alcohol abuse 12/3/2014    Hypertension     meds > 5 yrs    Hypochloremia 11/13/2014    Hypokalemia     Hyponatremia     Lateral epicondylitis 3/14/2016     Updating Deprecated Diagnoses    Lumbar herniated disc     Obesity (BMI 30-39.9) 11/6/2014    Pedal edema     Restless leg syndrome     RLS (restless legs syndrome)     Thrombocytosis (HCC)     Tinea pedis 12/16/2014    Toenail fungus     UTI (lower urinary tract infection) 11/19/2014     Past Surgical History:   Procedure Laterality Date    BACK SURGERY  02/27/2012    Dr. Ness Post / L 4 disc OR    BUNIONECTOMY Right 12/15/2016    [facility] LEONIE BUNDY DPM  LAPIDUS BUNIONECTOMY   [id number], LAPAROSCOPIC  02/05/2018    Dr. Myriam Lima BIOMCHN DEV INTERVERTEBRAL DSC SPC W/ARTHRD N/A 9/20/2018    L5-S1 PLIF (POSTERIOR LUMBAR INTERBODY FUSION) performed by Olu Puente [facility] at [facility] 94 4 disc OR    TONSILLECTOMY       Social History     Socioeconomic History    Marital status:      Spouse name: Not on file    Number of children: 2    Years of education: Not on file    Highest education level: Not on file   Occupational History    Occupation: unemployed   Social Needs    Financial resource strain: Not on file    Food insecurity     Worry: Not on file     Inability: Not on file   [employer] needs     Medical: Not on file     Non-medical: Not on file   Tobacco Use    Smoking status: Never Smoker    Smokeless tobacco: Never Used    Tobacco comment: pt states that she smoked maybe 5 cigarettes in  her teens.    Substance and Sexual Activity    Alcohol use: Yes     Comment: social    Drug use: No    Sexual activity: Yes     Partners: Male   Lifestyle    Physical activity     Days per week: Not on file     Minutes per session: Not on file    Stress: Not on file   Relationships    Social connections     Talks on phone: Not on file     Gets together: Not on file     Attends [employer] service: Not on file     Active member of club or organization: Not on file     Attends meetings of clubs or organizations: Not on file     Relationship status: Not on file    Intimate partner violence     Fear of current or ex partner: Not on file     Emotionally abused: Not on file     Physically abused: Not on file     Forced sexual activity: Not on file   Other Topics Concern    Not on file   Social History Narrative    Not on file     Family History   Problem Relation Age of Onset    High Blood Pressure Mother     Arthritis Mother     High Blood Pressure Father     Arthritis Father     High Blood Pressure Sister     Depression Sister     High Blood Pressure Brother     Arthritis Daughter         fibromyalgia Allergies:  Amlodipine; Lyrica [pregabalin]; and Toprol xl [metoprolol]    Review of Systems   Constitutional: Negative for activity change, appetite change, diaphoresis and unexpected weight change. Eyes: Negative for photophobia and visual disturbance. Respiratory: Negative for chest tightness and shortness of breath. No orthopnea   Cardiovascular: Negative for chest pain, palpitations and leg swelling. Gastrointestinal: Negative for abdominal distention and abdominal pain. Genitourinary: Negative for flank pain and frequency. Musculoskeletal: Negative for gait problem and joint swelling. Neurological: Negative for dizziness, weakness, light-headedness and headaches. Psychiatric/Behavioral: Negative for confusion. PHYSICAL EXAM/ RESULTS    (Limited exam: only performed what is available through a visual exam during the video encounter as indicated due to the restrictions of the COVID-19 pandemic)    No flowsheet data found. No results found for this or any previous visit (from the past 2016 hour(s)). Assessment:       Diagnosis Orders   1. Essential hypertension  CBC with Differential    Comprehensive Metabolic Panel    Lipid, Fasting    TSH with Reflex         Orders Placed This Encounter   Procedures    CBC with Differential     Standing Status:   Future     Standing Expiration Date:   7/21/2021    Comprehensive Metabolic Panel     Standing Status:   Future     Standing Expiration Date:   7/21/2021    Lipid, Fasting     Standing Status:   Future     Standing Expiration Date:   7/21/2021    TSH with Reflex     Standing Status:   Future     Standing Expiration Date:   7/21/2021         Plan:   Return in about 4 weeks (around 8/18/2020) for lab/testing review hypertension blood pressure cuff check. Patient Instructions   Patient will have her labs done after she is done with this illness and follow-up in the office for blood pressure check and lab review.   She will bring her home cuff with her to verify accuracy again this year. This visit ended at 2:19 pm    The resources used for this visit were Epic for chart access and documentation and telephone conversation for patient input      Brandt Beauchamp M.D. An  electronic signature was used to authenticate this note. --Keven Miner MD on 7/21/2020 at 2:19 PM        Pursuant to the emergency declaration under the [address] [place] [address], [facility] waiver authority and the [employer] Act, this Virtual  Visit was conducted, with patient's consent, to reduce the patient's risk of exposure to COVID-19 and provide continuity of care for an established patient. Services were provided through a video synchronous discussion virtually to substitute for in-person clinic visit.

## 2020-07-21 NOTE — PATIENT INSTRUCTIONS
Patient will have her labs done after she is done with this illness and follow-up in the office for blood pressure check and lab review. She will bring her home cuff with her to verify accuracy again this year.

## 2020-08-17 RX ORDER — TIZANIDINE 4 MG/1
4 TABLET ORAL EVERY 8 HOURS PRN
Qty: 60 TABLET | Refills: 0 | Status: SHIPPED | OUTPATIENT
Start: 2020-08-17 | End: 2020-09-10

## 2020-08-17 RX ORDER — DULOXETIN HYDROCHLORIDE 60 MG/1
60 CAPSULE, DELAYED RELEASE ORAL DAILY
Qty: 30 CAPSULE | Refills: 0 | Status: SHIPPED | OUTPATIENT
Start: 2020-08-17 | End: 2020-09-14

## 2020-08-18 ENCOUNTER — OFFICE VISIT (OUTPATIENT)
Dept: FAMILY MEDICINE CLINIC | Age: 60
End: 2020-08-18
Payer: COMMERCIAL

## 2020-08-18 VITALS
SYSTOLIC BLOOD PRESSURE: 150 MMHG | TEMPERATURE: 97.7 F | HEIGHT: 61 IN | HEART RATE: 76 BPM | BODY MASS INDEX: 36.82 KG/M2 | WEIGHT: 195 LBS | DIASTOLIC BLOOD PRESSURE: 92 MMHG | OXYGEN SATURATION: 98 %

## 2020-08-18 DIAGNOSIS — I10 ESSENTIAL HYPERTENSION: ICD-10-CM

## 2020-08-18 LAB
ALBUMIN SERPL-MCNC: 4.1 G/DL (ref 3.5–4.6)
ALP BLD-CCNC: 167 U/L (ref 40–130)
ALT SERPL-CCNC: 107 U/L (ref 0–33)
ANION GAP SERPL CALCULATED.3IONS-SCNC: 10 MEQ/L (ref 9–15)
AST SERPL-CCNC: 126 U/L (ref 0–35)
BASOPHILS ABSOLUTE: 0.1 K/UL (ref 0–0.2)
BASOPHILS RELATIVE PERCENT: 0.9 %
BILIRUB SERPL-MCNC: 0.5 MG/DL (ref 0.2–0.7)
BUN BLDV-MCNC: 6 MG/DL (ref 6–20)
CALCIUM SERPL-MCNC: 9.4 MG/DL (ref 8.5–9.9)
CHLORIDE BLD-SCNC: 93 MEQ/L (ref 95–107)
CHOLESTEROL, FASTING: 209 MG/DL (ref 0–199)
CO2: 31 MEQ/L (ref 20–31)
CREAT SERPL-MCNC: 0.56 MG/DL (ref 0.5–0.9)
EOSINOPHILS ABSOLUTE: 0 K/UL (ref 0–0.7)
EOSINOPHILS RELATIVE PERCENT: 0.7 %
GFR AFRICAN AMERICAN: >60
GFR NON-AFRICAN AMERICAN: >60
GLOBULIN: 3.3 G/DL (ref 2.3–3.5)
GLUCOSE BLD-MCNC: 111 MG/DL (ref 70–99)
HCT VFR BLD CALC: 45 % (ref 37–47)
HDLC SERPL-MCNC: 101 MG/DL (ref 40–59)
HEMOGLOBIN: 15.5 G/DL (ref 12–16)
LDL CHOLESTEROL CALCULATED: 91 MG/DL (ref 0–129)
LYMPHOCYTES ABSOLUTE: 1 K/UL (ref 1–4.8)
LYMPHOCYTES RELATIVE PERCENT: 17 %
MCH RBC QN AUTO: 34.5 PG (ref 27–31.3)
MCHC RBC AUTO-ENTMCNC: 34.5 % (ref 33–37)
MCV RBC AUTO: 100.1 FL (ref 82–100)
MONOCYTES ABSOLUTE: 0.6 K/UL (ref 0.2–0.8)
MONOCYTES RELATIVE PERCENT: 10.2 %
NEUTROPHILS ABSOLUTE: 4.3 K/UL (ref 1.4–6.5)
NEUTROPHILS RELATIVE PERCENT: 71.2 %
PDW BLD-RTO: 13.4 % (ref 11.5–14.5)
PLATELET # BLD: 271 K/UL (ref 130–400)
POTASSIUM SERPL-SCNC: 4.5 MEQ/L (ref 3.4–4.9)
RBC # BLD: 4.5 M/UL (ref 4.2–5.4)
SODIUM BLD-SCNC: 134 MEQ/L (ref 135–144)
TOTAL PROTEIN: 7.4 G/DL (ref 6.3–8)
TRIGLYCERIDE, FASTING: 84 MG/DL (ref 0–150)
TSH REFLEX: 1.2 UIU/ML (ref 0.44–3.86)
WBC # BLD: 6 K/UL (ref 4.8–10.8)

## 2020-08-18 PROCEDURE — 99214 OFFICE O/P EST MOD 30 MIN: CPT | Performed by: FAMILY MEDICINE

## 2020-08-18 RX ORDER — CEPHALEXIN 500 MG/1
500 CAPSULE ORAL 3 TIMES DAILY
Qty: 21 CAPSULE | Refills: 0 | Status: SHIPPED | OUTPATIENT
Start: 2020-08-18 | End: 2020-08-25

## 2020-08-18 SDOH — ECONOMIC STABILITY: FOOD INSECURITY: WITHIN THE PAST 12 MONTHS, YOU WORRIED THAT YOUR FOOD WOULD RUN OUT BEFORE YOU GOT MONEY TO BUY MORE.: NEVER TRUE

## 2020-08-18 SDOH — ECONOMIC STABILITY: TRANSPORTATION INSECURITY
IN THE PAST 12 MONTHS, HAS THE LACK OF TRANSPORTATION KEPT YOU FROM MEDICAL APPOINTMENTS OR FROM GETTING MEDICATIONS?: NO

## 2020-08-18 SDOH — ECONOMIC STABILITY: FOOD INSECURITY: WITHIN THE PAST 12 MONTHS, THE FOOD YOU BOUGHT JUST DIDN'T LAST AND YOU DIDN'T HAVE MONEY TO GET MORE.: NEVER TRUE

## 2020-08-18 SDOH — ECONOMIC STABILITY: INCOME INSECURITY: HOW HARD IS IT FOR YOU TO PAY FOR THE VERY BASICS LIKE FOOD, HOUSING, MEDICAL CARE, AND HEATING?: NOT HARD AT ALL

## 2020-08-18 SDOH — ECONOMIC STABILITY: TRANSPORTATION INSECURITY
IN THE PAST 12 MONTHS, HAS LACK OF TRANSPORTATION KEPT YOU FROM MEETINGS, WORK, OR FROM GETTING THINGS NEEDED FOR DAILY LIVING?: NO

## 2020-08-18 ASSESSMENT — ENCOUNTER SYMPTOMS
ABDOMINAL PAIN: 0
PHOTOPHOBIA: 0
SHORTNESS OF BREATH: 0
CHEST TIGHTNESS: 0
ABDOMINAL DISTENTION: 0

## 2020-08-18 ASSESSMENT — PATIENT HEALTH QUESTIONNAIRE - PHQ9
SUM OF ALL RESPONSES TO PHQ QUESTIONS 1-9: 0
SUM OF ALL RESPONSES TO PHQ QUESTIONS 1-9: 0
2. FEELING DOWN, DEPRESSED OR HOPELESS: 0
SUM OF ALL RESPONSES TO PHQ9 QUESTIONS 1 & 2: 0
1. LITTLE INTEREST OR PLEASURE IN DOING THINGS: 0

## 2020-08-18 NOTE — PROGRESS NOTES
Diagnosis Orders   1. Essential hypertension     2. LUE weakness  External Referral to Physical Therapy   3. Elbow pain, chronic, left  External Referral to Physical Therapy   4. Weakness of left hand  External Referral to Physical Therapy   5. Folliculitis  cephALEXin (KEFLEX) 500 MG capsule     Return in about 2 weeks (around 9/1/2020) for forearm skin. Subjective:      Patient ID: Himanshu Florez is a 64 y.o. female who presents for:  Chief Complaint   Patient presents with    Check-Up     x 2 month -  HTN     Blood Pressure Check     Accuracy of at home cuff check     Health Maintenance     Declins mammo & colonoscopy     Skin Problem     Both arms        Hypertension:   She is somewhat adherent to a low sodium/low cholesterol diet. Patient denies chest pain, shortness of breath, headache, blurred vision, palpitations and leg swelling. Antihypertensive medication side effects: no medication side effects noted. Use of agents associated with hypertension: none. Pt is currently on ace inhibitor and diuretic  Pt has no complications from this regimen and has achieved acceptable hypertensive control. Pt does  have a home blood pressure monitor that has been verified for accuracy. IS NOT ACCURATE  Patient's other medical conditions that contribute to blood pressure goals are hyperlipidemia. Pt has not suffered complications from HTN such as heart attack, stroke, or renal compromise. Pt has LUE weakness with cold and numb sensation. Investigation was done according to the pt and found that something is wrong with the elbow and needs MRI to investigate. Patient also notes that she is getting hair follicles again inflamed on her forearms and then she has been pulling the skin out and the hair follicle out. Then she has a sore that is present that she scratches at.       Current Outpatient Medications on File Prior to Visit   Medication Sig Dispense Refill    DULoxetine (CYMBALTA) 60 MG extended release capsule TAKE 1 CAPSULE BY MOUTH DAILY 30 capsule 0    tiZANidine (ZANAFLEX) 4 MG tablet Take 1 tablet by mouth every 8 hours as needed (muscle spasm) 60 tablet 0    lisinopril (PRINIVIL;ZESTRIL) 40 MG tablet TAKE 1 TABLET BY MOUTH TWICE DAILY 60 tablet 5    hydroCHLOROthiazide (HYDRODIURIL) 25 MG tablet Take 1 tablet by mouth every morning 30 tablet 5    Ciclopirox (LOPROX) 0.77 % gel Apply topically 2 times daily      ciclopirox (PENLAC) 8 % solution Apply nightly to nails, Should wipe off once weekly with alcohol soaked cotton ball. 6.6 mL 5     No current facility-administered medications on file prior to visit.       Past Medical History:   Diagnosis Date    Alcoholic fatty liver     By  Liver U/S 14    Arthritis     Borderline hyperlipidemia 2014    has never been on meds    Bunion, right foot     Chronic back pain     Chronic neck pain     Degenerative disc disease, lumbar 2014    L5-S1    Dermatitis     Elevated liver enzymes 2014    History of alcohol abuse 12/3/2014    Hypertension     meds > 5 yrs    Hypochloremia 2014    Hypokalemia     Hyponatremia     Lateral epicondylitis 3/14/2016     Updating Deprecated Diagnoses    Lumbar herniated disc     Obesity (BMI 30-39.9) 2014    Pedal edema     Restless leg syndrome     RLS (restless legs syndrome)     Thrombocytosis (HCC)     Tinea pedis 2014    Toenail fungus     UTI (lower urinary tract infection) 2014     Past Surgical History:   Procedure Laterality Date    BACK SURGERY  2012    Dr. Zainab Joseph / L 4 disc OR    BUNIONECTOMY Right 12/15/2016    [facility]  LAPIDUS BUNIONECTOMY   [facility]  [address], [place]AROSCOPIC  2018    Dr. Leela Claire [facility] SPC W/ARTHCOLTON N/A 2018    L5-S1 PLIF (POSTERIOR LUMBAR INTERBODY FUSION) performed by Patric Mosley MD at [place] [id number] 4 disc change. Eyes: Negative for photophobia and visual disturbance. Respiratory: Negative for chest tightness and shortness of breath. No orthopnea   Cardiovascular: Negative for chest pain, palpitations and leg swelling. Gastrointestinal: Negative for abdominal distention and abdominal pain. Genitourinary: Negative for flank pain and frequency. Musculoskeletal: Negative for gait problem and joint swelling. Neurological: Negative for dizziness, weakness, light-headedness and headaches. Psychiatric/Behavioral: Negative for confusion. Objective:   BP (!) 150/92 Comment: pt cuff  Pulse 76   Temp 97.7 °F (36.5 °C)   Ht 5' 1\" (1.549 m)   Wt 195 lb (88.5 kg)   LMP 09/19/2012   SpO2 98%   BMI 36.84 kg/m²     Physical Exam  Constitutional:       Appearance: Normal appearance. She is well-developed. She is not ill-appearing or diaphoretic. Comments: Vitals signs reviewed   HENT:      Head: Normocephalic and atraumatic. Right Ear: Hearing and external ear normal.      Left Ear: Hearing and external ear normal.      Nose: No nasal deformity or rhinorrhea. Eyes:      General: Lids are normal.         Right eye: No discharge. Left eye: No discharge. Extraocular Movements:      Right eye: No nystagmus. Left eye: No nystagmus. Conjunctiva/sclera: Conjunctivae normal.      Right eye: No hemorrhage. Left eye: No hemorrhage. Pupils: Pupils are equal, round, and reactive to light. Neck:      Musculoskeletal: Full passive range of motion without pain and neck supple. Normal range of motion. Thyroid: No thyroid mass or thyromegaly. Vascular: Normal carotid pulses. No carotid bruit or JVD. Trachea: No tracheal tenderness or tracheal deviation. Cardiovascular:      Rate and Rhythm: Normal rate and regular rhythm. Chest Wall: PMI displaced: normal location PMI.       Pulses:           Carotid pulses are 2+ on the right side and 2+ on the left side.       Radial pulses are 2+ on the right side and 2+ on the left side. Heart sounds: S1 normal and S2 normal. No murmur. No friction rub. No gallop. No S3 sounds. Pulmonary:      Effort: Pulmonary effort is normal. No accessory muscle usage or respiratory distress. Breath sounds: Normal breath sounds. Chest:      Chest wall: No deformity. Abdominal:      General: There is no distension. Musculoskeletal:         General: No deformity. Cervical back: She exhibits normal range of motion, no tenderness and no deformity. Lymphadenopathy:      Cervical:      Right cervical: No superficial cervical adenopathy. Left cervical: No superficial cervical adenopathy. Upper Body:      Right upper body: No supraclavicular adenopathy. Left upper body: No supraclavicular adenopathy. Skin:     General: Skin is warm and dry. Findings: No bruising or rash. Nails: There is no clubbing. Comments: No folliculitis visualized at this time, open eschar is noted in the area she states she has pulled the hairs from   Neurological:      Mental Status: She is alert. Cranial Nerves: Cranial nerve deficit: CN II-XII GI without obvious deficit. Sensory: Sensory deficit: grossly intact for hands. Motor: Weakness (LUE) present. No tremor. Atrophy: B UE and LE without atrophy. Abnormal muscle tone: B UE and LE have normal tone. Coordination: Coordination normal.      Gait: Gait normal.   Psychiatric:         Attention and Perception: She is attentive. Mood and Affect: Mood is not anxious. Affect is not inappropriate. Speech: Speech normal.         Behavior: Behavior is not agitated. Behavior is cooperative. Judgment: Judgment normal.         No results found for this visit on 08/18/20. No results found for this or any previous visit (from the past 2016 hour(s)). Assessment:       Diagnosis Orders   1. Essential hypertension     2.  LUE

## 2020-09-01 ENCOUNTER — OFFICE VISIT (OUTPATIENT)
Dept: FAMILY MEDICINE CLINIC | Age: 60
End: 2020-09-01
Payer: COMMERCIAL

## 2020-09-01 VITALS
WEIGHT: 191.5 LBS | BODY MASS INDEX: 36.15 KG/M2 | OXYGEN SATURATION: 98 % | SYSTOLIC BLOOD PRESSURE: 128 MMHG | DIASTOLIC BLOOD PRESSURE: 82 MMHG | HEART RATE: 97 BPM | TEMPERATURE: 97.2 F | HEIGHT: 61 IN

## 2020-09-01 PROCEDURE — 99214 OFFICE O/P EST MOD 30 MIN: CPT | Performed by: FAMILY MEDICINE

## 2020-09-01 RX ORDER — FOLIC ACID 1 MG/1
1 TABLET ORAL DAILY
Qty: 90 TABLET | Refills: 1 | Status: SHIPPED | OUTPATIENT
Start: 2020-09-01 | End: 2021-04-08

## 2020-09-01 RX ORDER — CICLOPIROX 7.7 MG/G
GEL TOPICAL 2 TIMES DAILY
Qty: 30 G | Refills: 0 | Status: SHIPPED | OUTPATIENT
Start: 2020-09-01 | End: 2021-05-24

## 2020-09-01 RX ORDER — CLINDAMYCIN PHOSPHATE 10 MG/G
GEL TOPICAL
Qty: 30 G | Refills: 0 | Status: SHIPPED | OUTPATIENT
Start: 2020-09-01 | End: 2020-09-24

## 2020-09-01 ASSESSMENT — ENCOUNTER SYMPTOMS: COLOR CHANGE: 0

## 2020-09-01 NOTE — PROGRESS NOTES
Diagnosis Orders   1. Abnormal LFTs  Comprehensive Metabolic Panel   2. Macrocytosis  cyanocobalamin ([facility] VITAMIN B12) 1000 MCG tablet    folic acid (FOLVITE) 1 MG tablet   3. Folliculitis  clindamycin (CLEOCIN-T) 1 % gel   4. Tinea pedis of both feet  Ciclopirox (LOPROX) 0.77 % gel   5. Onychomycosis     6. Neurodermatitis       Return in about 3 weeks (around 9/22/2020). Subjective:      Patient ID: Colin Belle is a 64 y.o. female who presents for:  Chief Complaint   Patient presents with    2 Week Follow-Up     Forearm skin - she states that she is still itchy - new area on face within the last 2 weeks     [facility] Maintenance     declines mammo & colonoscopy        Patient states she took the medication and it seemed to help things feel a bit better. However she states she has new lesions on her face and arms now. She is itching and irritated. She keeps covering them with make-up. She thought it was helping. She drinks 3-6 beers minimum per day. She states she has been staying more towards 3 beers a day at this point. We discussed that her liver function tests are elevated and that can lead to increased itching. At this point the bilirubin is still normal.  She denies smoking. Patient states she is to get a prescription that she brings with her from her prior dermatologist for her feet. She states the used to peel. This is stopped them from peeling.       Current Outpatient Medications on File Prior to Visit   Medication Sig Dispense Refill    DULoxetine (CYMBALTA) 60 MG extended release capsule TAKE 1 CAPSULE BY MOUTH DAILY 30 capsule 0    tiZANidine (ZANAFLEX) 4 MG tablet Take 1 tablet by mouth every 8 hours as needed (muscle spasm) 60 tablet 0    lisinopril (PRINIVIL;ZESTRIL) 40 MG tablet TAKE 1 TABLET BY MOUTH TWICE DAILY 60 tablet 5    hydroCHLOROthiazide (HYDRODIURIL) 25 MG tablet Take 1 tablet by mouth every morning 30 tablet 5    Ciclopirox (LOPROX) 0.77 % gel Apply topically 2 times daily      ciclopirox (PENLAC) 8 % solution Apply nightly to nails, Should wipe off once weekly with alcohol soaked cotton ball. 6.6 mL 5     No current facility-administered medications on file prior to visit.       Past Medical History:   Diagnosis Date    Alcoholic fatty liver 55/18/8820    By  Liver U/S 12/09/14    Arthritis     Borderline hyperlipidemia 11/13/2014    has never been on meds    Bunion, right foot     Chronic back pain     Chronic neck pain     Degenerative disc disease, lumbar 11/13/2014    L5-S1    Dermatitis     Elevated liver enzymes 11/13/2014    History of alcohol abuse 12/3/2014    Hypertension     meds > 5 yrs    Hypochloremia 11/13/2014    Hypokalemia     Hyponatremia     Lateral epicondylitis 3/14/2016     Updating Deprecated Diagnoses    Lumbar herniated disc     Obesity (BMI 30-39.9) 11/6/2014    Pedal edema     Restless leg syndrome     RLS (restless legs syndrome)     Thrombocytosis (HCC)     Tinea pedis 12/16/2014    Toenail fungus     UTI (lower urinary tract infection) 11/19/2014     Past Surgical History:   Procedure Laterality Date    BACK SURGERY  02/27/2012    Dr. Enzo Arana / L 4 disc OR    BUNIONECTOMY Right 12/15/2016    [facility]  [address] [facility], [place]OS[place]  02/05/2018    Dr. Kiera Carpenter SPC W/ARTHRD N/A 9/20/2018    L5-S1 PLIF (POSTERIOR LUMBAR INTERBODY FUSION) performed by Ho Villaseñor MD at [place] [address] 4 disc OR    TONSILLECTOMY       Social History     Socioeconomic History    Marital status:      Spouse name: Not on file    Number of children: 2    Years of education: Not on file    Highest education level: Not on file   Occupational History    Occupation: unemployed   Social Needs    Financial resource strain: Not hard at all   [place] insecurity     Worry: Never true     Inability: Never true    Transportation needs     Medical: No     Non-medical: No   Tobacco Use    Smoking status: Never Smoker    Smokeless tobacco: Never Used    Tobacco comment: pt states that she smoked maybe 5 cigarettes in  her teens. Substance and Sexual Activity    Alcohol use: Yes     Comment: social    Drug use: No    Sexual activity: Yes     Partners: Male   Lifestyle    Physical activity     Days per week: Not on file     Minutes per session: Not on file    Stress: Not on file   Relationships    Social connections     Talks on phone: Not on file     Gets together: Not on file     Attends [employer] service: Not on file     Active member of club or organization: Not on file     Attends meetings of clubs or organizations: Not on file     Relationship status: Not on file    Intimate partner violence     Fear of current or ex partner: Not on file     Emotionally abused: Not on file     Physically abused: Not on file     Forced sexual activity: Not on file   Other Topics Concern    Not on file   Social History Narrative    Not on file     Family History   Problem Relation Age of Onset    High Blood Pressure Mother     Arthritis Mother     High Blood Pressure Father     Arthritis Father     High Blood Pressure Sister     Depression Sister     High Blood Pressure Brother     Arthritis Daughter         fibromyalgia     Allergies:  Amlodipine; Lyrica [pregabalin]; and Toprol xl [metoprolol]    Review of Systems   Constitutional: Negative for chills and fever. Skin: Positive for wound. Negative for color change and rash. Allergic/Immunologic: Negative for environmental allergies, food allergies and immunocompromised state. Hematological: Negative for adenopathy. Does not bruise/bleed easily. Psychiatric/Behavioral: Negative for behavioral problems and sleep disturbance.        Objective:   /82   Pulse 97   Temp 97.2 °F (36.2 °C)   Ht 5' 1\" (1.549 m)   Wt 191 lb 8 oz (86.9 kg)   LMP 09/19/2012   SpO2 98%   BMI 36.18 kg/m²     Physical Exam  Constitutional:       General: She is not in acute distress. Appearance: Normal appearance. She is well-developed. She is not toxic-appearing. HENT:      Head: Normocephalic and atraumatic. Right Ear: Hearing and tympanic membrane normal.      Left Ear: Hearing and tympanic membrane normal.      Nose: Nose normal. No nasal deformity. Eyes:      General: Lids are normal.         Right eye: No discharge. Left eye: No discharge. Conjunctiva/sclera: Conjunctivae normal.      Pupils: Pupils are equal, round, and reactive to light. Neck:      Musculoskeletal: Full passive range of motion without pain. Thyroid: No thyroid mass or thyromegaly. Vascular: No JVD. Trachea: Trachea and phonation normal.   Cardiovascular:      Rate and Rhythm: Normal rate and regular rhythm. Pulmonary:      Effort: No accessory muscle usage or respiratory distress. Musculoskeletal:      Comments: FROM all large muscle groups and joints as witnessed when walking to exam table, getting on, and getting off the exam table. Skin:     General: Skin is warm and dry. Findings: No rash. Comments: Bilateral feet are clear at this time. Toenails are painted unable to evaluate. Bilateral upper extremities and facial lesions refer to pictures below   Neurological:      Mental Status: She is alert. Motor: No tremor or atrophy. Gait: Gait normal.   Psychiatric:         Speech: Speech normal.         Behavior: Behavior normal.         Thought Content: Thought content normal.                         No results found for this visit on 09/01/20.     Recent Results (from the past 2016 hour(s))   TSH with Reflex    Collection Time: 08/18/20  2:55 PM   Result Value Ref Range    TSH 1.200 0.440 - 3.860 uIU/mL   Lipid, Fasting    Collection Time: 08/18/20  2:55 PM   Result Value Ref Range    Cholesterol, Fasting 209 (H) 0 - 199 mg/dL Triglyceride, Fasting 84 0 - 150 mg/dL     (H) 40 - 59 mg/dL    LDL Calculated 91 0 - 129 mg/dL   Comprehensive Metabolic Panel    Collection Time: 08/18/20  2:55 PM   Result Value Ref Range    Sodium 134 (L) 135 - 144 mEq/L    Potassium 4.5 3.4 - 4.9 mEq/L    Chloride 93 (L) 95 - 107 mEq/L    CO2 31 20 - 31 mEq/L    Anion Gap 10 9 - 15 mEq/L    Glucose 111 (H) 70 - 99 mg/dL    BUN 6 6 - 20 mg/dL    CREATININE 0.56 0.50 - 0.90 mg/dL    GFR Non-African American >60.0 >60    GFR  >60.0 >60    Calcium 9.4 8.5 - 9.9 mg/dL    Total Protein 7.4 6.3 - 8.0 g/dL    Alb 4.1 3.5 - 4.6 g/dL    Total Bilirubin 0.5 0.2 - 0.7 mg/dL    Alkaline Phosphatase 167 (H) 40 - 130 U/L     (H) 0 - 33 U/L     (H) 0 - 35 U/L    Globulin 3.3 2.3 - 3.5 g/dL   CBC Auto Differential    Collection Time: 08/18/20  2:55 PM   Result Value Ref Range    WBC 6.0 4.8 - 10.8 K/uL    RBC 4.50 4. 20 - 5.40 M/uL    Hemoglobin 15.5 12.0 - 16.0 g/dL    Hematocrit 45.0 37.0 - 47.0 %    .1 (H) 82.0 - 100.0 fL    MCH 34.5 (H) 27.0 - 31.3 pg    MCHC 34.5 33.0 - 37.0 %    RDW 13.4 11.5 - 14.5 %    Platelets 344 275 - 370 K/uL    Neutrophils % 71.2 %    Lymphocytes % 17.0 %    Monocytes % 10.2 %    Eosinophils % 0.7 %    Basophils % 0.9 %    Neutrophils Absolute 4.3 1.4 - 6.5 K/uL    Lymphocytes Absolute 1.0 1.0 - 4.8 K/uL    Monocytes Absolute 0.6 0.2 - 0.8 K/uL    Eosinophils Absolute 0.0 0.0 - 0.7 K/uL    Basophils Absolute 0.1 0.0 - 0.2 K/uL           Assessment:       Diagnosis Orders   1. Abnormal LFTs  Comprehensive Metabolic Panel   2. Macrocytosis  cyanocobalamin ([facility] VITAMIN B12) 1000 MCG tablet    folic acid (FOLVITE) 1 MG tablet   3. Folliculitis  clindamycin (CLEOCIN-T) 1 % gel   4. Tinea pedis of both feet  Ciclopirox (LOPROX) 0.77 % gel   5. Onychomycosis     6.  Neurodermatitis           Orders Placed This Encounter   Procedures    Comprehensive Metabolic Panel     Standing Status:   Future     Standing Expiration Date:   9/1/2021         Plan:   Return in about 3 weeks (around 9/22/2020). Patient Instructions   Patient will initiate vitamin B12 and folate. Patient will continue to decrease alcohol intake. Labs will be repeated today. If LFTs are still elevated we will look at investigating. Patient will discontinue make-up use. She will utilize clindamycin cream on the open sores. We will look to consider medication for treating psychiatric aspect of this at the next visit. Patient needs to schedule physical therapy for prior issues. Medications for tinea pedis refilled. Brandt Cantrell M.D.

## 2020-09-01 NOTE — PATIENT INSTRUCTIONS
Patient will initiate vitamin B12 and folate. Patient will continue to decrease alcohol intake. Labs will be repeated today. If LFTs are still elevated we will look at investigating. Patient will discontinue make-up use. She will utilize clindamycin cream on the open sores. We will look to consider medication for treating psychiatric aspect of this at the next visit. Patient needs to schedule physical therapy for prior issues. Medications for tinea pedis refilled.

## 2020-09-14 RX ORDER — DULOXETIN HYDROCHLORIDE 60 MG/1
60 CAPSULE, DELAYED RELEASE ORAL DAILY
Qty: 30 CAPSULE | Refills: 0 | Status: SHIPPED | OUTPATIENT
Start: 2020-09-14 | End: 2020-10-12

## 2020-09-24 RX ORDER — CLINDAMYCIN PHOSPHATE 10 MG/G
GEL TOPICAL
Qty: 60 G | Refills: 0 | Status: SHIPPED | OUTPATIENT
Start: 2020-09-24 | End: 2021-05-25

## 2020-09-30 DIAGNOSIS — R79.89 ABNORMAL LFTS: ICD-10-CM

## 2020-09-30 LAB
ALBUMIN SERPL-MCNC: 4 G/DL (ref 3.5–4.6)
ALP BLD-CCNC: 98 U/L (ref 40–130)
ALT SERPL-CCNC: 72 U/L (ref 0–33)
ANION GAP SERPL CALCULATED.3IONS-SCNC: 12 MEQ/L (ref 9–15)
AST SERPL-CCNC: 60 U/L (ref 0–35)
BILIRUB SERPL-MCNC: <0.2 MG/DL (ref 0.2–0.7)
BUN BLDV-MCNC: 9 MG/DL (ref 6–20)
CALCIUM SERPL-MCNC: 9.8 MG/DL (ref 8.5–9.9)
CHLORIDE BLD-SCNC: 100 MEQ/L (ref 95–107)
CO2: 27 MEQ/L (ref 20–31)
CREAT SERPL-MCNC: 0.66 MG/DL (ref 0.5–0.9)
GFR AFRICAN AMERICAN: >60
GFR NON-AFRICAN AMERICAN: >60
GLOBULIN: 3.4 G/DL (ref 2.3–3.5)
GLUCOSE BLD-MCNC: 81 MG/DL (ref 70–99)
POTASSIUM SERPL-SCNC: 3.8 MEQ/L (ref 3.4–4.9)
SODIUM BLD-SCNC: 139 MEQ/L (ref 135–144)
TOTAL PROTEIN: 7.4 G/DL (ref 6.3–8)

## 2020-10-13 RX ORDER — DULOXETIN HYDROCHLORIDE 60 MG/1
60 CAPSULE, DELAYED RELEASE ORAL DAILY
Qty: 30 CAPSULE | Refills: 3 | Status: SHIPPED | OUTPATIENT
Start: 2020-10-13 | End: 2021-02-15

## 2020-10-13 RX ORDER — TIZANIDINE 4 MG/1
4 TABLET ORAL EVERY 8 HOURS PRN
Qty: 60 TABLET | Refills: 0 | Status: SHIPPED | OUTPATIENT
Start: 2020-10-13 | End: 2021-04-12

## 2020-10-13 NOTE — TELEPHONE ENCOUNTER
Patient was due for an appointment 3 weeks after her last office visit. She has not been in. She is already had 1 refill since then.   I will send a 30-day prescription of this and no further refills until appointment

## 2021-01-05 NOTE — TELEPHONE ENCOUNTER
8/18/2020  LOV       Plan:   Return in about 2 weeks (around 9/1/2020) for forearm skin.       Return in about 3 weeks (around 9/22/2020).     Scheduled NONE

## 2021-01-06 RX ORDER — HYDROCHLOROTHIAZIDE 25 MG/1
25 TABLET ORAL EVERY MORNING
Qty: 30 TABLET | Refills: 5 | Status: SHIPPED | OUTPATIENT
Start: 2021-01-06 | End: 2021-07-12

## 2021-02-04 ENCOUNTER — TELEPHONE (OUTPATIENT)
Dept: FAMILY MEDICINE CLINIC | Age: 61
End: 2021-02-04

## 2021-02-14 DIAGNOSIS — G89.29 CHRONIC LOW BACK PAIN: ICD-10-CM

## 2021-02-14 DIAGNOSIS — M54.50 CHRONIC LOW BACK PAIN: ICD-10-CM

## 2021-02-16 RX ORDER — DULOXETIN HYDROCHLORIDE 60 MG/1
60 CAPSULE, DELAYED RELEASE ORAL DAILY
Qty: 30 CAPSULE | Refills: 0 | Status: SHIPPED | OUTPATIENT
Start: 2021-02-16 | End: 2021-03-18

## 2021-02-23 ENCOUNTER — TELEPHONE (OUTPATIENT)
Dept: FAMILY MEDICINE CLINIC | Age: 61
End: 2021-02-23

## 2021-02-24 DIAGNOSIS — R79.89 ABNORMAL LFTS: Primary | ICD-10-CM

## 2021-03-14 DIAGNOSIS — D75.89 MACROCYTOSIS: ICD-10-CM

## 2021-03-15 RX ORDER — LANOLIN ALCOHOL/MO/W.PET/CERES
CREAM (GRAM) TOPICAL
Qty: 90 TABLET | Refills: 1 | Status: SHIPPED | OUTPATIENT
Start: 2021-03-15 | End: 2022-01-31 | Stop reason: ALTCHOICE

## 2021-03-17 DIAGNOSIS — G89.29 CHRONIC LOW BACK PAIN: ICD-10-CM

## 2021-03-17 DIAGNOSIS — M54.50 CHRONIC LOW BACK PAIN: ICD-10-CM

## 2021-03-18 RX ORDER — DULOXETIN HYDROCHLORIDE 60 MG/1
60 CAPSULE, DELAYED RELEASE ORAL DAILY
Qty: 30 CAPSULE | Refills: 0 | Status: SHIPPED | OUTPATIENT
Start: 2021-03-18 | End: 2021-04-16

## 2021-04-07 ENCOUNTER — TELEPHONE (OUTPATIENT)
Dept: FAMILY MEDICINE CLINIC | Age: 61
End: 2021-04-07

## 2021-04-07 DIAGNOSIS — D75.89 MACROCYTOSIS: ICD-10-CM

## 2021-04-08 RX ORDER — FOLIC ACID 1 MG/1
1 TABLET ORAL DAILY
Qty: 90 TABLET | Refills: 0 | Status: SHIPPED | OUTPATIENT
Start: 2021-04-08 | End: 2022-01-31 | Stop reason: ALTCHOICE

## 2021-04-12 DIAGNOSIS — M62.838 MUSCLE SPASM: ICD-10-CM

## 2021-04-12 RX ORDER — TIZANIDINE 4 MG/1
4 TABLET ORAL EVERY 8 HOURS PRN
Qty: 60 TABLET | Refills: 0 | Status: SHIPPED | OUTPATIENT
Start: 2021-04-12 | End: 2021-07-15

## 2021-04-12 NOTE — TELEPHONE ENCOUNTER
lov 09/01/20      Left message reminder for pt that she is due for an appt.      Multiple attempts have been made to contact pt over the past couple months

## 2021-04-15 DIAGNOSIS — G89.29 CHRONIC LOW BACK PAIN: ICD-10-CM

## 2021-04-15 DIAGNOSIS — M54.50 CHRONIC LOW BACK PAIN: ICD-10-CM

## 2021-04-16 RX ORDER — DULOXETIN HYDROCHLORIDE 60 MG/1
60 CAPSULE, DELAYED RELEASE ORAL DAILY
Qty: 30 CAPSULE | Refills: 0 | Status: SHIPPED | OUTPATIENT
Start: 2021-04-16 | End: 2021-05-24 | Stop reason: SDUPTHER

## 2021-04-23 ENCOUNTER — TELEPHONE (OUTPATIENT)
Dept: FAMILY MEDICINE CLINIC | Age: 61
End: 2021-04-23

## 2021-05-24 DIAGNOSIS — M54.50 CHRONIC LOW BACK PAIN: ICD-10-CM

## 2021-05-24 DIAGNOSIS — G89.29 CHRONIC LOW BACK PAIN: ICD-10-CM

## 2021-05-24 RX ORDER — DULOXETIN HYDROCHLORIDE 60 MG/1
60 CAPSULE, DELAYED RELEASE ORAL DAILY
Qty: 30 CAPSULE | Refills: 0 | Status: SHIPPED | OUTPATIENT
Start: 2021-05-24 | End: 2021-06-25

## 2021-05-24 RX ORDER — CICLOPIROX 7.7 MG/G
GEL TOPICAL 2 TIMES DAILY
Qty: 100 G | Refills: 0 | Status: SHIPPED | OUTPATIENT
Start: 2021-05-24 | End: 2022-01-03 | Stop reason: ALTCHOICE

## 2021-05-24 NOTE — PROGRESS NOTES
2021    Damien Caicedo (:  1960) is a 64 y.o. female, here for evaluation of the following medical concerns:  Chief Complaint   Patient presents with    Medication Refill    Health Maintenance     Pt would like cologuard. Pt declined mammogram.      HPI  Medication refill  Last appointment was 2020    eHTN  Blood pressure slightly elevated today  Taking lisinopril and Hctz  She does not check her BP at home  \"Feeling really good\"  Pt states she did lose 20 lbs recently    Review of Systems   Constitutional: Negative for chills and fever. HENT: Negative for congestion, sinus pressure and sore throat. Respiratory: Negative for cough and shortness of breath. Cardiovascular: Negative for chest pain and palpitations. Gastrointestinal: Negative for abdominal pain and vomiting. Musculoskeletal: Negative for arthralgias and myalgias. Skin: Negative for rash and wound. Neurological: Negative for speech difficulty and light-headedness. Psychiatric/Behavioral: Negative for suicidal ideas. The patient is not nervous/anxious. Prior to Visit Medications    Medication Sig Taking? Authorizing Provider   Ciclopirox (LOPROX) 0.77 % gel Apply topically 2 times daily Yes Rafael Reyes DO   DULoxetine (CYMBALTA) 60 MG extended release capsule Take 1 capsule by mouth daily Yes Rafael Reyes DO   folic acid (FOLVITE) 1 MG tablet Take 1 tablet by mouth daily Yes Jean Pierre Lu MD   vitamin B-12 (CYANOCOBALAMIN) 1000 MCG tablet Take 1 tablet by mouth daily Yes Jean Pierre uL MD   lisinopril (PRINIVIL;ZESTRIL) 40 MG tablet TAKE 1 TABLET BY MOUTH TWICE DAILY Yes Jean Pierre Lu MD   hydroCHLOROthiazide (HYDRODIURIL) 25 MG tablet Take 1 tablet by mouth every morning Yes Jean Pierre Lu MD   ciclopirox (PENLAC) 8 % solution Apply nightly to nails, Should wipe off once weekly with alcohol soaked cotton ball.   Patient not taking: Reported on 2021  Sergo Martinez MD Allergies   Allergen Reactions    Amlodipine Swelling    Lyrica [Pregabalin] Other (See Comments)     PASSING OUT  Passed out    Toprol Xl [Metoprolol] Other (See Comments) and Swelling     swelling  swelling       Past Medical History:   Diagnosis Date    Alcoholic fatty liver 50/58/0519    By  Liver U/S 12/09/14    Arthritis     Borderline hyperlipidemia 11/13/2014    has never been on meds    Bunion, right foot     Chronic back pain     Chronic neck pain     Degenerative disc disease, lumbar 11/13/2014    L5-S1    Dermatitis     Elevated liver enzymes 11/13/2014    History of alcohol abuse 12/3/2014    Hypertension     meds > 5 yrs    Hypochloremia 11/13/2014    Hypokalemia     Hyponatremia     Lateral epicondylitis 3/14/2016     Updating Deprecated Diagnoses    Lumbar herniated disc     Obesity (BMI 30-39.9) 11/6/2014    Pedal edema     Restless leg syndrome     RLS (restless legs syndrome)     Thrombocytosis (HCC)     Tinea pedis 12/16/2014    Toenail fungus     UTI (lower urinary tract infection) 11/19/2014       Past Surgical History:   Procedure Laterality Date    BACK SURGERY  02/27/2012    Dr. Ricardo Burrell / [facility] 4 disc OR    BUNIONECTOMY Right 12/15/2016    [facility]  [address] [facility] LAPAROSCOPIC  02/05/2018    Dr. Flor Wang [facility]  W/OMAR N/A 9/20/2018    L5-S1 PLIF (POSTERIOR LUMBAR INTERBODY FUSION) performed by Ranjan Hawthorne MD at [place] [address] 4 disc OR    TONSILLECTOMY         Social History     Socioeconomic History    Marital status:      Spouse name: Not on file    Number of children: 2    Years of education: Not on file    Highest education level: Not on file   Occupational History    Occupation: unemployed   Tobacco Use    Smoking status: Never Smoker    Smokeless tobacco: Never Used    Tobacco comment: pt states that she smoked maybe 5 cigarettes in  her teens. Substance and Sexual Activity    Alcohol use: Yes     Comment: social    Drug use: No    Sexual activity: Yes     Partners: Male   Other Topics Concern    Not on file   Social History Narrative    Not on file     Social Determinants of Health     Financial Resource Strain: Low Risk     Difficulty of Paying Living Expenses: Not hard at all   Food Insecurity: No Food Insecurity    Worried About Running Out of Food in the Last Year: Never true    [place] of Food in the Last Year: Never true   Transportation Needs: No Transportation Needs    Lack of Transportation (Medical): No    Lack of Transportation (Non-Medical): No   Physical Activity:     Days of Exercise per Week:     Minutes of Exercise per Session:    Stress:     Feeling of Stress :    Social Connections:     Frequency of Communication with Friends and Family:     Frequency of Social Gatherings with Friends and Family:     Attends Faith Services:     Active Member of Clubs or Organizations:     Attends Club or Organization Meetings:     Marital Status:    Intimate Partner Violence:     Fear of Current or Ex-Partner:     Emotionally Abused:     Physically Abused:     Sexually Abused:         Family History   Problem Relation Age of Onset    High Blood Pressure Mother     Arthritis Mother     High Blood Pressure Father     Arthritis Father     High Blood Pressure Sister     Depression Sister     High Blood Pressure Brother     Arthritis Daughter         fibromyalgia       Vitals:    05/25/21 1632 05/25/21 1640   BP: (!) 140/68 (!) 142/70   Site: Right Upper Arm Right Upper Arm   Position: Sitting Sitting   Cuff Size: Large Adult Large Adult   Pulse: 96    Temp: 98.7 °F (37.1 °C)    SpO2: 98%    Weight: 196 lb (88.9 kg)    Height: 5' 1\" (1.549 m)        Estimated body mass index is 37.03 kg/m² as calculated from the following:    Height as of this encounter: 5' 1\" (1.549 m).     Weight as of this encounter: 196 lb (88.9 kg). No results for input(s): WBC, RBC, HGB, HCT, MCV, MCH, MCHC, RDW, PLT, MPV in the last 72 hours. No results for input(s): NA, K, CL, CO2, BUN, CREATININE, GLUCOSE, CALCIUM, PROT, LABALBU, BILITOT, ALKPHOS, AST, ALT in the last 72 hours. Lab Results   Component Value Date    LABA1C 5.4 08/24/2018       No results found. Physical Exam  Constitutional:       Appearance: Normal appearance. She is normal weight. HENT:      Head: Normocephalic and atraumatic. Eyes:      Extraocular Movements: Extraocular movements intact. Conjunctiva/sclera: Conjunctivae normal.   Cardiovascular:      Rate and Rhythm: Normal rate and regular rhythm. Pulses: Normal pulses. Heart sounds: Normal heart sounds. Pulmonary:      Effort: Pulmonary effort is normal.      Breath sounds: Normal breath sounds. No wheezing or rales. Skin:     General: Skin is warm. Capillary Refill: Capillary refill takes less than 2 seconds. Findings: No rash. Neurological:      Mental Status: She is alert. Psychiatric:         Mood and Affect: Mood normal.         Thought Content: Thought content normal.         Judgment: Judgment normal.         ASSESSMENT/PLAN:  1. Essential hypertension  -Blood pressure slightly elevated on exam, will have patient monitor and follow-up in 1 month to recheck    2. Chronic midline low back pain without sciatica  -Refill of Cymbalta sent to pharmacy yesterday    3. Tinea pedis of both feet  -Refill Loprox sent to pharmacy yesterday      ---------------------------------------------------------------------  Side effects, adverse effects of the medication prescribed today, as well as treatment plan/ rationale and result expectations have been discussed with the patient who expresses understanding and desires to proceed. Close follow up to evaluate treatment results and for coordination of care.   I have reviewed the patient's medical history in detail and updated the computerized patient record. As always, patient is advised that if symptoms worsen in any way they will proceed to the nearest emergency room. --------------------------------------------------------------------    Return in about 4 weeks (around 6/22/2021) for f/u with PCP, BP recheck. An  electronic signature was used to authenticate this note.     --Garett Gipson DO on 5/25/2021 at 4:46 PM

## 2021-05-25 ENCOUNTER — OFFICE VISIT (OUTPATIENT)
Dept: FAMILY MEDICINE CLINIC | Age: 61
End: 2021-05-25
Payer: COMMERCIAL

## 2021-05-25 VITALS
WEIGHT: 196 LBS | HEART RATE: 96 BPM | BODY MASS INDEX: 37 KG/M2 | TEMPERATURE: 98.7 F | SYSTOLIC BLOOD PRESSURE: 142 MMHG | DIASTOLIC BLOOD PRESSURE: 70 MMHG | HEIGHT: 61 IN | OXYGEN SATURATION: 98 %

## 2021-05-25 DIAGNOSIS — G89.29 CHRONIC MIDLINE LOW BACK PAIN WITHOUT SCIATICA: ICD-10-CM

## 2021-05-25 DIAGNOSIS — I10 ESSENTIAL HYPERTENSION: Primary | ICD-10-CM

## 2021-05-25 DIAGNOSIS — B35.3 TINEA PEDIS OF BOTH FEET: ICD-10-CM

## 2021-05-25 DIAGNOSIS — M54.50 CHRONIC MIDLINE LOW BACK PAIN WITHOUT SCIATICA: ICD-10-CM

## 2021-05-25 DIAGNOSIS — Z12.11 SCREENING FOR COLON CANCER: Primary | ICD-10-CM

## 2021-05-25 PROCEDURE — 99213 OFFICE O/P EST LOW 20 MIN: CPT | Performed by: STUDENT IN AN ORGANIZED HEALTH CARE EDUCATION/TRAINING PROGRAM

## 2021-05-25 ASSESSMENT — ENCOUNTER SYMPTOMS
SINUS PRESSURE: 0
ABDOMINAL PAIN: 0
SHORTNESS OF BREATH: 0
SORE THROAT: 0
COUGH: 0
VOMITING: 0

## 2021-05-25 ASSESSMENT — PATIENT HEALTH QUESTIONNAIRE - PHQ9
SUM OF ALL RESPONSES TO PHQ QUESTIONS 1-9: 0
2. FEELING DOWN, DEPRESSED OR HOPELESS: 0

## 2021-05-25 NOTE — PATIENT INSTRUCTIONS
Call  911 anytime you think you may need emergency care. This may mean having symptoms that suggest that your blood pressure is causing a serious heart or blood vessel problem. Your blood pressure may be over 180/120. For example, call 911 if:  · You have symptoms of a heart attack. These may include:  ? Chest pain or pressure, or a strange feeling in the chest.  ? Sweating. ? Shortness of breath. ? Nausea or vomiting. ? Pain, pressure, or a strange feeling in the back, neck, jaw, or upper belly or in one or both shoulders or arms. ? Lightheadedness or sudden weakness. ? A fast or irregular heartbeat. · You have symptoms of a stroke. These may include:  ? Sudden numbness, tingling, weakness, or loss of movement in your face, arm, or leg, especially on only one side of your body. ? Sudden vision changes. ? Sudden trouble speaking. ? Sudden confusion or trouble understanding simple statements. ? Sudden problems with walking or balance. ? A sudden, severe headache that is different from past headaches. · You have severe back or belly pain. Do not wait until your blood pressure comes down on its own. Get help right away. Call your doctor now or seek immediate care if:  · Your blood pressure is much higher than normal (such as 180/120 or higher), but you don't have symptoms. · You think high blood pressure is causing symptoms, such as:  ? Severe headache.  ? Blurry vision. Watch closely for changes in your health, and be sure to contact your doctor if:  · Your blood pressure measures higher than your doctor recommends at least 2 times. That means the top number is higher or the bottom number is higher, or both. · You think you may be having side effects from your blood pressure medicine. Where can you learn more? Go to https://jess.Eventure Interactive. org and sign in to your exoro system account.  Enter D709 in the [facility] box to learn more about \"High Blood Pressure: Care Instructions. \"     If you do not have an account, please click on the \"Sign Up Now\" link. Current as of: December 16, 2019               Content Version: 12.5  © 1604-4723 Healthwise, Incorporated. Care instructions adapted under license by [facility] ([facility]). If you have questions about a medical condition or this instruction, always ask your healthcare professional. [address] any warranty or liability for your use of this information. DASH Diet: Care Instructions  Your Care Instructions     The DASH diet is an eating plan that can help lower your blood pressure. DASH stands for Dietary Approaches to Stop Hypertension. Hypertension is high blood pressure. The DASH diet focuses on eating foods that are high in calcium, potassium, and magnesium. These nutrients can lower blood pressure. The foods that are highest in these nutrients are fruits, vegetables, low-fat dairy products, nuts, seeds, and legumes. But taking calcium, potassium, and magnesium supplements instead of eating foods that are high in those nutrients does not have the same effect. The DASH diet also includes whole grains, fish, and poultry. The DASH diet is one of several lifestyle changes your doctor may recommend to lower your high blood pressure. Your doctor may also want you to decrease the amount of sodium in your diet. Lowering sodium while following the DASH diet can lower blood pressure even further than just the DASH diet alone. Follow-up care is a key part of your treatment and safety. Be sure to make and go to all appointments, and call your doctor if you are having problems. It's also a good idea to know your test results and keep a list of the medicines you take. How can you care for yourself at home? Following the DASH diet  · Eat 4 to 5 servings of fruit each day. A serving is 1 medium-sized piece of fruit, ½ cup chopped or canned fruit, 1/4 cup dried fruit, or 4 ounces (½ cup) of fruit juice. and onion to sandwiches. ? Combine a ready-made pizza crust with low-fat mozzarella cheese and lots of vegetable toppings. Try using tomatoes, squash, spinach, broccoli, carrots, cauliflower, and onions. ? Have a variety of cut-up vegetables with a low-fat dip as an appetizer instead of chips and dip. ? Sprinkle sunflower seeds or chopped almonds over salads. Or try adding chopped walnuts or almonds to cooked vegetables. ? Try some vegetarian meals using beans and peas. Add garbanzo or kidney beans to salads. Make burritos and tacos with mashed sam beans or black beans. Where can you learn more? Go to https://Solstice BiologicspeannyCHIC.TVeb.Wenwo. org and sign in to your Strap account. Enter T542 in the Lee [facility] box to learn more about \"DASH Diet: Care Instructions. \"     If you do not have an account, please click on the \"Sign Up Now\" link. Current as of: December 16, 2019               Content Version: 12.5  © 4668-0547 Healthwise, Incorporated. Care instructions adapted under license by [facility] ([facility]). If you have questions about a medical condition or this instruction, always ask your healthcare professional. [address] any warranty or liability for your use of this information.

## 2021-06-15 DIAGNOSIS — Z12.11 SCREENING FOR COLON CANCER: ICD-10-CM

## 2021-07-12 RX ORDER — HYDROCHLOROTHIAZIDE 25 MG/1
25 TABLET ORAL EVERY MORNING
Qty: 30 TABLET | Refills: 5 | Status: SHIPPED | OUTPATIENT
Start: 2021-07-12 | End: 2022-01-03 | Stop reason: SDUPTHER

## 2021-07-14 DIAGNOSIS — M62.838 MUSCLE SPASM: ICD-10-CM

## 2021-07-15 RX ORDER — TIZANIDINE 4 MG/1
4 TABLET ORAL EVERY 8 HOURS PRN
Qty: 60 TABLET | Refills: 0 | Status: SHIPPED | OUTPATIENT
Start: 2021-07-15 | End: 2021-08-04

## 2021-07-15 NOTE — TELEPHONE ENCOUNTER
Future Appointments    This patient does not currently have any appointments scheduled.   Recent Visits    05/25/2021 Essential hypertension   [address], [place]   09/01/2020 Abnormal LFTs   [facility] Danny Guzman MD   08/18/2020 Essential hypertension   [facility] Danny Guzman MD

## 2021-07-30 DIAGNOSIS — G89.29 CHRONIC LOW BACK PAIN: ICD-10-CM

## 2021-07-30 DIAGNOSIS — M54.50 CHRONIC LOW BACK PAIN: ICD-10-CM

## 2021-08-02 RX ORDER — DULOXETIN HYDROCHLORIDE 60 MG/1
60 CAPSULE, DELAYED RELEASE ORAL DAILY
Qty: 30 CAPSULE | Refills: 0 | Status: SHIPPED | OUTPATIENT
Start: 2021-08-02 | End: 2022-01-03 | Stop reason: ALTCHOICE

## 2021-08-02 NOTE — TELEPHONE ENCOUNTER
Dr. Emre Valladares appointment me stated 1 month follow-up recheck for blood pressure.   So patient will be given 1 month supply needs to get in for blood pressure check appointment

## 2021-08-04 NOTE — TELEPHONE ENCOUNTER
LMTCB and make appt. Return in about 4 weeks (around 6/22/2021) for f/u with PCP, BP recheck. An  electronic signature was used to authenticate this note.     --Brent Lane,  on 5/25/2021 at 4:46 PM

## 2021-08-05 ENCOUNTER — TELEPHONE (OUTPATIENT)
Dept: FAMILY MEDICINE CLINIC | Age: 61
End: 2021-08-05

## 2021-11-29 ENCOUNTER — HOSPITAL ENCOUNTER (EMERGENCY)
Age: 61
Discharge: HOME | End: 2021-11-29
Attending: EMERGENCY MEDICINE
Payer: COMMERCIAL

## 2021-11-29 VITALS
SYSTOLIC BLOOD PRESSURE: 111 MMHG | HEIGHT: 61 IN | OXYGEN SATURATION: 96 % | DIASTOLIC BLOOD PRESSURE: 54 MMHG | BODY MASS INDEX: 34.93 KG/M2 | WEIGHT: 185 LBS | TEMPERATURE: 99 F | RESPIRATION RATE: 16 BRPM | HEART RATE: 78 BPM

## 2021-11-29 DIAGNOSIS — L30.9 DERMATITIS: Primary | ICD-10-CM

## 2021-11-29 PROCEDURE — 6360000002 HC RX W HCPCS: Performed by: EMERGENCY MEDICINE

## 2021-11-29 PROCEDURE — 99283 EMERGENCY DEPT VISIT LOW MDM: CPT

## 2021-11-29 PROCEDURE — 96372 THER/PROPH/DIAG INJ SC/IM: CPT

## 2021-11-29 RX ORDER — DOXYCYCLINE 100 MG/1
100 CAPSULE ORAL 2 TIMES DAILY
COMMUNITY
Start: 2021-11-29 | End: 2021-12-09

## 2021-11-29 RX ORDER — DEXAMETHASONE SODIUM PHOSPHATE 10 MG/ML
6 INJECTION, SOLUTION INTRAMUSCULAR; INTRAVENOUS ONCE
Status: COMPLETED | OUTPATIENT
Start: 2021-11-29 | End: 2021-11-29

## 2021-11-29 RX ADMIN — DEXAMETHASONE SODIUM PHOSPHATE 6 MG: 10 INJECTION, SOLUTION INTRAMUSCULAR; INTRAVENOUS at 18:28

## 2021-11-29 ASSESSMENT — PAIN DESCRIPTION - PROGRESSION: CLINICAL_PROGRESSION: GRADUALLY WORSENING

## 2021-11-29 ASSESSMENT — PAIN DESCRIPTION - ONSET: ONSET: ON-GOING

## 2021-11-29 ASSESSMENT — PAIN SCALES - GENERAL: PAINLEVEL_OUTOF10: 6

## 2021-11-29 ASSESSMENT — PAIN DESCRIPTION - FREQUENCY: FREQUENCY: CONTINUOUS

## 2021-11-29 ASSESSMENT — PAIN DESCRIPTION - LOCATION: LOCATION: FACE

## 2021-11-29 ASSESSMENT — PAIN DESCRIPTION - PAIN TYPE: TYPE: ACUTE PAIN

## 2021-11-29 NOTE — ED PROVIDER NOTES
[facility] ED  EMERGENCY DEPARTMENT ENCOUNTER      Pt Name: Reji Palma  MRN: [id number]  [place] 1960  Date of evaluation: 11/29/2021  Provider: Tomas Walker MD    [address] [place] [address]       Chief Complaint   Patient presents with    Rash     face,          HISTORY OF PRESENT ILLNESS   (Location/Symptom, Timing/Onset, Context/Setting, Quality, Duration, Modifying Factors, Severity)  Note limiting factors. 72-year-old female presenting with facial rash. Seen at urgent care earlier today and started on the antibiotic. Has not taken this yet. Has been trying multiple creams and soaps with no improvement in irritation. Has had a steroid shot in the past and is requesting this again today. No history of diabetes. Denies fevers, itchiness or any drainage. Nursing Notes were reviewed. REVIEW OF SYSTEMS    (2-9 systems for level 4, 10 or more for level 5)     Review of Systems   Skin: Positive for rash. All other systems reviewed and are negative. Except as noted above the remainder of the review of systems was reviewed and negative.        PAST MEDICAL HISTORY     Past Medical History:   Diagnosis Date    Alcoholic fatty liver 66/41/6412    By  Liver U/S 12/09/14    Arthritis     Borderline hyperlipidemia 11/13/2014    has never been on meds    Bunion, right foot     Chronic back pain     Chronic neck pain     Degenerative disc disease, lumbar 11/13/2014    L5-S1    Dermatitis     Elevated liver enzymes 11/13/2014    History of alcohol abuse 12/3/2014    Hypertension     meds > 5 yrs    Hypochloremia 11/13/2014    Hypokalemia     Hyponatremia     Lateral epicondylitis 3/14/2016     Updating Deprecated Diagnoses    Lumbar herniated disc     Obesity (BMI 30-39.9) 11/6/2014    Pedal edema     Restless leg syndrome     RLS (restless legs syndrome)     Thrombocytosis     Tinea pedis 12/16/2014    Toenail fungus     UTI (lower urinary tract infection) 11/19/2014 SURGICAL HISTORY       Past Surgical History:   Procedure Laterality Date    BACK SURGERY  02/27/2012    Dr. Reyes Primer / [facility] 4 disc OR    BUNIONECTOMY Right 12/15/2016    [facility] LEONIE BUN[facility] DPM  LAPIDUS BUNIONECTOMY   [facility]  02/05/2018    Dr. Krystle Montilla [facility] SPC W/ARTHRD N/A 9/20/2018    L5-S1 PLIF (POSTERIOR LUMBAR INTERBODY FUSION) performed by Chanelle Toledo MD at [place] [address] 4 disc OR    TONSILLECTOMY           CURRENT MEDICATIONS       Current Discharge Medication List      CONTINUE these medications which have NOT CHANGED    Details   doxycycline monohydrate (MONODOX) 100 MG capsule Take 100 mg by mouth 2 times daily      DULoxetine (CYMBALTA) 60 MG extended release capsule TAKE 1 CAPSULE BY MOUTH DAILY  Qty: 30 capsule, Refills: 0    Associated Diagnoses: Chronic low back pain      hydroCHLOROthiazide (HYDRODIURIL) 25 MG tablet Take 1 tablet by mouth every morning  Qty: 30 tablet, Refills: 5      Ciclopirox (LOPROX) 0.77 % gel Apply topically 2 times daily  Qty: 100 g, Refills: 0      folic acid (FOLVITE) 1 MG tablet Take 1 tablet by mouth daily  Qty: 90 tablet, Refills: 0    Associated Diagnoses: Macrocytosis      vitamin B-12 (CYANOCOBALAMIN) 1000 MCG tablet Take 1 tablet by mouth daily  Qty: 90 tablet, Refills: 1    Associated Diagnoses: Macrocytosis      lisinopril (PRINIVIL;ZESTRIL) 40 MG tablet TAKE 1 TABLET BY MOUTH TWICE DAILY  Qty: 60 tablet, Refills: 11    Associated Diagnoses: Essential hypertension      ciclopirox (PENLAC) 8 % solution Apply nightly to nails, Should wipe off once weekly with alcohol soaked cotton ball.   Qty: 6.6 mL, Refills: 5    Associated Diagnoses: Toenail fungus             ALLERGIES     Amlodipine, Lyrica [pregabalin], and Toprol xl [metoprolol]    FAMILY HISTORY       Family History   Problem Relation Age of Onset    High Blood Pressure Mother     Arthritis Mother Last Year: Not on file    Number of Places Lived in the Last Year: Not on file    Unstable Housing in the Last Year: Not on file       SCREENINGS               PHYSICAL EXAM    (up to 7 for level 4, 8 or more for level 5)     ED Triage Vitals [11/29/21 1618]   BP Temp Temp Source Pulse Resp SpO2 Height Weight   (!) 151/85 99.3 °F (37.4 °C) Oral 94 18 94 % 5' 1\" (1.549 m) 185 lb (83.9 kg)       Physical Exam  Vitals and nursing note reviewed. Constitutional:       General: She is not in acute distress. Appearance: Normal appearance. She is well-developed. She is not ill-appearing. HENT:      Head: Normocephalic and atraumatic. Mouth/Throat:      Mouth: Mucous membranes are moist.      Pharynx: Oropharynx is clear. Eyes:      Extraocular Movements: Extraocular movements intact. Conjunctiva/sclera: Conjunctivae normal.   Cardiovascular:      Rate and Rhythm: Normal rate and regular rhythm. Pulmonary:      Effort: Pulmonary effort is normal.      Breath sounds: Normal breath sounds. Abdominal:      General: Bowel sounds are normal.      Palpations: Abdomen is soft. Tenderness: There is no abdominal tenderness. Musculoskeletal:         General: No deformity. Normal range of motion. Cervical back: Normal range of motion and neck supple. Skin:     General: Skin is warm and dry. Capillary Refill: Capillary refill takes less than 2 seconds. Findings: Rash present. Neurological:      General: No focal deficit present. Mental Status: She is alert and oriented to person, place, and time. Mental status is at baseline. Cranial Nerves: No cranial nerve deficit. Psychiatric:         Thought Content:  Thought content normal.         DIAGNOSTIC RESULTS     EKG: All EKG's are interpreted by the Emergency Department Physician who either signs or Co-signs this chart in the absence of a cardiologist.    RADIOLOGY:   Non-plain film images such as CT, Ultrasound and MRI are read by the radiologist. Plain radiographic images are visualized and preliminarily interpreted by the emergency physician with the below findings:    Interpretation per the Radiologist below, if available at the time of this note:    No orders to display       LABS:  Labs Reviewed - No data to display    All other labs were within normal range or not returned as of this dictation. EMERGENCY DEPARTMENT COURSE and DIFFERENTIAL DIAGNOSIS/MDM:   Vitals:    Vitals:    11/29/21 1618   BP: (!) 151/85   Pulse: 94   Resp: 18   Temp: 99.3 °F (37.4 °C)   TempSrc: Oral   SpO2: 94%   Weight: 185 lb (83.9 kg)   Height: 5' 1\" (1.549 m)       MDM    Procedures    CRITICAL CARE TIME   Total Critical Care time was 0 minutes, excluding separately reportable procedures. There was a high probability of clinically significant/life threatening deterioration in the patient's condition which required my urgent intervention. FINAL IMPRESSION      1.  Dermatitis Stable         DISPOSITION/PLAN   DISPOSITION Decision To Discharge 11/29/2021 06:18:14 PM      (Please note that portions of this note were completed with a voice recognition program.  Efforts were made to edit the dictations but occasionally words are mis-transcribed.)    Ayesha Barrientos MD (electronically signed)  Attending Emergency Physician        Ayesha Barrientos MD  11/29/21 Kaitlin Lewis

## 2021-12-06 ENCOUNTER — VIRTUAL VISIT (OUTPATIENT)
Dept: FAMILY MEDICINE CLINIC | Age: 61
End: 2021-12-06
Payer: COMMERCIAL

## 2021-12-06 DIAGNOSIS — B34.9 VIRAL ILLNESS: Primary | ICD-10-CM

## 2021-12-06 PROCEDURE — 99213 OFFICE O/P EST LOW 20 MIN: CPT

## 2021-12-06 RX ORDER — AMOXICILLIN AND CLAVULANATE POTASSIUM 875; 125 MG/1; MG/1
1 TABLET, FILM COATED ORAL 2 TIMES DAILY
Qty: 14 TABLET | Refills: 0 | Status: SHIPPED | OUTPATIENT
Start: 2021-12-06 | End: 2021-12-13

## 2021-12-06 RX ORDER — ONDANSETRON 4 MG/1
4 TABLET, ORALLY DISINTEGRATING ORAL 3 TIMES DAILY PRN
Qty: 12 TABLET | Refills: 0 | Status: SHIPPED | OUTPATIENT
Start: 2021-12-06 | End: 2021-12-10

## 2021-12-06 RX ORDER — BENZONATATE 200 MG/1
200 CAPSULE ORAL 3 TIMES DAILY PRN
Qty: 21 CAPSULE | Refills: 0 | Status: SHIPPED | OUTPATIENT
Start: 2021-12-06 | End: 2021-12-13

## 2021-12-06 ASSESSMENT — ENCOUNTER SYMPTOMS
NAUSEA: 1
DIARRHEA: 1
TROUBLE SWALLOWING: 0
EYE DISCHARGE: 0
CHEST TIGHTNESS: 0
SINUS PAIN: 1
SORE THROAT: 0
APNEA: 0
RHINORRHEA: 0
SINUS PRESSURE: 1
EYE PAIN: 0
VOMITING: 1
ABDOMINAL PAIN: 0
COUGH: 1
SHORTNESS OF BREATH: 0
FACIAL SWELLING: 0
COLOR CHANGE: 0
EYE ITCHING: 0
WHEEZING: 0
BACK PAIN: 0

## 2021-12-06 NOTE — PATIENT INSTRUCTIONS
your doctor now or seek immediate medical care if:    · You seem to be getting much sicker.     · You have a new or higher fever.     · You have blood in your stools.     · You have new belly pain, or your pain gets worse.     · You have a new rash. Watch closely for changes in your health, and be sure to contact your doctor if:    · You start to get better and then get worse.     · You do not get better as expected. Where can you learn more? Go to https://EmbedStore.[employer] for the Arts. org and sign in to your Codasip account. Enter V398 in the ERUCES box to learn more about \"Viral Infections: Care Instructions. \"     If you do not have an account, please click on the \"Sign Up Now\" link. Current as of: July 1, 2021               Content Version: 13.0  © 3340-1671 Healthwise, Datalot. Care instructions adapted under license by [facility] ([facility]). If you have questions about a medical condition or this instruction, always ask your healthcare professional. [address] any warranty or liability for your use of this information. Expect a 7 to 14-day course of the illness before symptoms resolve. Increase water intake and watch for signs of dehydration such as feeling light headed, reduced urine output fatigue or shortness of breath when walking. Go to the ER if you experience these symptoms or fevers that cannot be controlled with Tylenol or ibuprofen  Use Coricidin HBP or Afrin (oxymetazoline) nasal spray for symptom of sinus congestion. Use Mucinex (guaifenesin ) for chest congestion.

## 2021-12-06 NOTE — PROGRESS NOTES
[facility] Encounter  CHIEF COMPLAINT       Chief Complaint   Patient presents with    Headache     starting 9 days ago    Emesis    Cough     Visit converted from virtual to in person  HISTORY OF PRESENT ILLNESS   Ashley Wallace is a 63 y.o. female who presents with:  HPI  Patient reporting vomiting diarrhea cough ongoing for 9 days. She reports cough is nonproductive dry irritating persistent. Also reporting sinus pain and pressure pain in her face. She has been having vomiting off and on. Patient reports she is taking a laxative to help clear out her stomach. REVIEW OF SYSTEMS     Review of Systems   Constitutional: Negative for appetite change, chills, diaphoresis, fatigue and fever. HENT: Positive for congestion, sinus pressure and sinus pain. Negative for ear discharge, ear pain, facial swelling, hearing loss, mouth sores, postnasal drip, rhinorrhea, sore throat and trouble swallowing. Eyes: Negative for pain, discharge and itching. Respiratory: Positive for cough. Negative for apnea, chest tightness, shortness of breath and wheezing. Cardiovascular: Negative for chest pain and palpitations. Gastrointestinal: Positive for diarrhea, nausea and vomiting. Negative for abdominal pain. Endocrine: Negative for cold intolerance and heat intolerance. Genitourinary: Negative for decreased urine volume and difficulty urinating. Musculoskeletal: Negative for arthralgias, back pain and myalgias. Skin: Negative for color change, pallor and rash. Neurological: Negative for dizziness, syncope, weakness, light-headedness and headaches. Hematological: Negative for adenopathy. Psychiatric/Behavioral: Negative for behavioral problems, confusion and sleep disturbance.      PAST MEDICAL HISTORY         Diagnosis Date    Alcoholic fatty liver 88/37/0625    By  Liver U/S 12/09/14    Arthritis     Borderline hyperlipidemia 11/13/2014    has never been on meds    Bunion, right foot     Chronic back pain     Chronic neck pain     Degenerative disc disease, lumbar 2014    L5-S1    Dermatitis     Elevated liver enzymes 2014    History of alcohol abuse 12/3/2014    Hypertension     meds > 5 yrs    Hypochloremia 2014    Hypokalemia     Hyponatremia     Lateral epicondylitis 3/14/2016     Updating Deprecated Diagnoses    Lumbar herniated disc     Obesity (BMI 30-39.9) 2014    Pedal edema     Restless leg syndrome     RLS (restless legs syndrome)     Thrombocytosis     Tinea pedis 2014    Toenail fungus     UTI (lower urinary tract infection) 2014     SURGICAL HISTORY     Patient  has a past surgical history that includes Tonsillectomy;  section ([address] [place] [address]); Bunionectomy (Right, 12/15/2016); Cholecystectomy, laparoscopic (2018); Spine surgery; back surgery (2012); and pr insj biomchn dev intervertebral dsc spc w/arthrd (N/A, 2018). CURRENT MEDICATIONS       Previous Medications    CICLOPIROX (LOPROX) 0.77 % GEL    Apply topically 2 times daily    CICLOPIROX (PENLAC) 8 % SOLUTION    Apply nightly to nails, Should wipe off once weekly with alcohol soaked cotton ball. DOXYCYCLINE MONOHYDRATE (MONODOX) 100 MG CAPSULE    Take 100 mg by mouth 2 times daily    DULOXETINE (CYMBALTA) 60 MG EXTENDED RELEASE CAPSULE    TAKE 1 CAPSULE BY MOUTH DAILY    FOLIC ACID (FOLVITE) 1 MG TABLET    Take 1 tablet by mouth daily    HYDROCHLOROTHIAZIDE (HYDRODIURIL) 25 MG TABLET    Take 1 tablet by mouth every morning    LISINOPRIL (PRINIVIL;ZESTRIL) 40 MG TABLET    TAKE 1 TABLET BY MOUTH TWICE DAILY    VITAMIN B-12 (CYANOCOBALAMIN) 1000 MCG TABLET    Take 1 tablet by mouth daily     ALLERGIES     Patient is is allergic to amlodipine, lyrica [pregabalin], and toprol xl [metoprolol].   FAMILY HISTORY     Patient'sfamily history includes Arthritis in her daughter, father, and mother; Depression in her sister; High Blood Pressure in her brother, father, mother, and sister. HISTORY     Patient  reports that she has never smoked. She has never used smokeless tobacco. She reports current alcohol use. She reports that she does not use drugs. PHYSICAL EXAM     VITALS   ,  ,  ,  ,    Physical Exam  Constitutional:       General: She is not in acute distress. Appearance: Normal appearance. She is not ill-appearing, toxic-appearing or diaphoretic. HENT:      Head: Normocephalic. Right Ear: Ear canal and external ear normal. No middle ear effusion. There is no impacted cerumen. No mastoid tenderness. Tympanic membrane is bulging. Tympanic membrane is not perforated or erythematous. Left Ear: Ear canal and external ear normal.  No middle ear effusion. There is no impacted cerumen. No mastoid tenderness. Tympanic membrane is bulging. Tympanic membrane is not perforated or erythematous. Nose: No congestion or rhinorrhea. Mouth/Throat:      Mouth: Mucous membranes are moist.      Pharynx: Oropharynx is clear. Tonsils: 0 on the right. 0 on the left. Eyes:      General:         Right eye: No discharge. Left eye: No discharge. Cardiovascular:      Rate and Rhythm: Normal rate and regular rhythm. Pulses: Normal pulses. Heart sounds: Normal heart sounds. No murmur heard. No gallop. Pulmonary:      Effort: Pulmonary effort is normal. No respiratory distress. Breath sounds: Normal breath sounds. No stridor. No wheezing, rhonchi or rales. Chest:      Chest wall: No tenderness. Abdominal:      General: Abdomen is flat. There is no distension. Palpations: Abdomen is soft. Tenderness: There is no abdominal tenderness. Musculoskeletal:         General: Normal range of motion. Cervical back: No rigidity or tenderness. Lymphadenopathy:      Cervical: No cervical adenopathy. Skin:     General: Skin is warm and dry.       Capillary Refill: Capillary refill takes less than 2 seconds. Coloration: Skin is not pale. Neurological:      General: No focal deficit present. Mental Status: She is alert and oriented to person, place, and time. Mental status is at baseline. Psychiatric:         Mood and Affect: Mood normal.         Behavior: Behavior normal.       READY CARE COURSE   No orders of the defined types were placed in this encounter. Labs:  No results found for this visit on 12/06/21. IMAGING:  No orders to display     Scheduled Meds:  Continuous Infusions:  PRN Meds:. PROCEDURES:  FINAL IMPRESSION      1. Viral illness        DISPOSITION/PLAN     HISTORY OF PRESENT ILLNESS   Maksim Mosqueda is a 63 y.o. female who presents with with complaint of persistent cough for 9 days that is nonproductive sinus pressure and pain in her cheeks. Nausea vomiting intermittently. She is taking a laxative \"to clear out her stomach\" also complaining of diarrhea. Pt is afebrile has nontoxic appearance and VS are stable. Patient reports she is able to hold down fluids she is eating dry crackers. This is been on for 3 to 4 days. On exam ears bilaterally have a bulging tympanic membrane. Patient reports pain with palpation of the maxillary sinuses. Neck is supple no masses. Lung sounds mildly diminished throughout. Heart sounds normal.  Symptoms are consistent with a viral illness. And secondary sinusitis. Patient will be treated for cough with Tessalon. Sinusitis will be treated with Augmentin. She will also be prescribed Zofran for nausea and vomiting. Patient provided education on expectation for course of viral illnesses    PATIENT REFERRED TO:  Return for Follow up with PCP. DISCHARGE MEDICATIONS:  New Prescriptions    No medications on file     Cannot display discharge medications since this is not an admission.        Chester Rodriguez, APRN - CNP

## 2021-12-06 NOTE — PROGRESS NOTES
2021    TELEHEALTH EVALUATION -- Audio/Visual (During CS- public health emergency)    Due to COVID 19 outbreak, patient's office visit was converted to a virtual visit. Patient was contacted and agreed to proceed with a virtual visit via Telephone Visit  The risks and benefits of converting to a virtual visit were discussed in light of the current infectious disease epidemic. Patient also understood that insurance coverage and co-pays are up to their individual insurance plans. HPI:    Manpreet Fritz (:  1960) has requested an audio/video evaluation for the following concern(s):  No chief complaint on file. ***    Patient Active Problem List   Diagnosis    Chronic back pain    Hypertension    Obesity (BMI 30-39. 9)    Degenerative disc disease, lumbar    Hypochloremia    Lumbar herniated disc    Tinea unguium    Lumbosacral spondylosis without myelopathy    Cervical spondylosis without myelopathy    Carpal tunnel syndrome of left wrist    RLS (restless legs syndrome)    Bunion, right foot    Chronic neck pain    Cervical nerve root disorder    Hallux abducto valgus    Metatarsus primus varus    Lumbar spondylosis     Past Medical History:   Diagnosis Date    Alcoholic fatty liver     By  Liver U/S 14    Arthritis     Borderline hyperlipidemia 2014    has never been on meds    Bunion, right foot     Chronic back pain     Chronic neck pain     Degenerative disc disease, lumbar 2014    L5-S1    Dermatitis     Elevated liver enzymes 2014    History of alcohol abuse 12/3/2014    Hypertension     meds > 5 yrs    Hypochloremia 2014    Hypokalemia     Hyponatremia     Lateral epicondylitis 3/14/2016     Updating Deprecated Diagnoses    Lumbar herniated disc     Obesity (BMI 30-39.9) 2014    Pedal edema     Restless leg syndrome     RLS (restless legs syndrome)     Thrombocytosis     Tinea pedis 2014    Toenail fungus     UTI (lower urinary tract infection) 11/19/2014         Review of Systems    Prior to Visit Medications    Medication Sig Taking? Authorizing Provider   doxycycline monohydrate (MONODOX) 100 MG capsule Take 100 mg by mouth 2 times daily  Historical Provider, MD   DULoxetine (CYMBALTA) 60 MG extended release capsule TAKE 1 CAPSULE BY MOUTH DAILY  Magdy Olguin MD   hydroCHLOROthiazide (HYDRODIURIL) 25 MG tablet Take 1 tablet by mouth every morning  Magdy Olguin MD   Ciclopirox (LOPROX) 0.77 % gel Apply topically 2 times daily  Mary Douglas DO   folic acid (FOLVITE) 1 MG tablet Take 1 tablet by mouth daily  Magdy Olguin MD   vitamin B-12 (CYANOCOBALAMIN) 1000 MCG tablet Take 1 tablet by mouth daily  Magdy Olguin MD   lisinopril (PRINIVIL;ZESTRIL) 40 MG tablet TAKE 1 TABLET BY MOUTH TWICE DAILY  Magdy Olguin MD   ciclopirox (PENLAC) 8 % solution Apply nightly to nails, Should wipe off once weekly with alcohol soaked cotton ball. Patient not taking: Reported on 5/25/2021  Santiago Jenkins MD       Social History     Tobacco Use    Smoking status: Never Smoker    Smokeless tobacco: Never Used    Tobacco comment: pt states that she smoked maybe 5 cigarettes in  her teens.    Substance Use Topics    Alcohol use: Yes     Comment: social    Drug use: No          PAST MEDICAL HISTORY         Diagnosis Date    Alcoholic fatty liver 98/20/3786    By  Liver U/S 12/09/14    Arthritis     Borderline hyperlipidemia 11/13/2014    has never been on meds    Bunion, right foot     Chronic back pain     Chronic neck pain     Degenerative disc disease, lumbar 11/13/2014    L5-S1    Dermatitis     Elevated liver enzymes 11/13/2014    History of alcohol abuse 12/3/2014    Hypertension     meds > 5 yrs    Hypochloremia 11/13/2014    Hypokalemia     Hyponatremia     Lateral epicondylitis 3/14/2016     Updating Deprecated Diagnoses    Lumbar herniated disc     Obesity (BMI 30-39.9) 2014    Pedal edema     Restless leg syndrome     RLS (restless legs syndrome)     Thrombocytosis     Tinea pedis 2014    Toenail fungus     UTI (lower urinary tract infection) 2014     SURGICAL HISTORY     Patient  has a past surgical history that includes Tonsillectomy;  section ([facility]); Bunionectomy (Right, 12/15/2016); Cholecystectomy, laparoscopic (2018); Spine surgery; back surgery (2012); and pr insj biomchn dev intervertebral dsc spc w/arthrd (N/A, 2018). CURRENT MEDICATIONS       Previous Medications    CICLOPIROX (LOPROX) 0.77 % GEL    Apply topically 2 times daily    CICLOPIROX (PENLAC) 8 % SOLUTION    Apply nightly to nails, Should wipe off once weekly with alcohol soaked cotton ball. DOXYCYCLINE MONOHYDRATE (MONODOX) 100 MG CAPSULE    Take 100 mg by mouth 2 times daily    DULOXETINE (CYMBALTA) 60 MG EXTENDED RELEASE CAPSULE    TAKE 1 CAPSULE BY MOUTH DAILY    FOLIC ACID (FOLVITE) 1 MG TABLET    Take 1 tablet by mouth daily    HYDROCHLOROTHIAZIDE (HYDRODIURIL) 25 MG TABLET    Take 1 tablet by mouth every morning    LISINOPRIL (PRINIVIL;ZESTRIL) 40 MG TABLET    TAKE 1 TABLET BY MOUTH TWICE DAILY    VITAMIN B-12 (CYANOCOBALAMIN) 1000 MCG TABLET    Take 1 tablet by mouth daily     ALLERGIES     Patient is is allergic to amlodipine, lyrica [pregabalin], and toprol xl [metoprolol]. FAMILY HISTORY     Patient'sfamily history includes Arthritis in her daughter, father, and mother; Depression in her sister; High Blood Pressure in her brother, father, mother, and sister. HISTORY     Patient  reports that she has never smoked. She has never used smokeless tobacco. She reports current alcohol use. She reports that she does not use drugs. PHYSICAL EXAMINATION:  No flowsheet data found.       No exam  Phone visit  Patient in no significant distress, conversant    Due to this being a TeleHealth encounter, evaluation of the following organ systems is limited: Vitals/Constitutional/EENT/Resp/CV/GI//MS/Neuro/Skin/Heme-Lymph-Imm. Lab Results   Component Value Date    WBC 6.0 08/18/2020    HGB 15.5 08/18/2020    HCT 45.0 08/18/2020     08/18/2020    CHOL 194 04/10/2018    TRIG 122 04/10/2018     (H) 08/18/2020    ALT 72 (H) 09/30/2020    AST 60 (H) 09/30/2020     09/30/2020    K 3.8 09/30/2020     09/30/2020    CREATININE 0.66 09/30/2020    BUN 9 09/30/2020    CO2 27 09/30/2020    TSH 1.530 04/10/2018    INR 1.0 09/19/2018    LABA1C 5.4 08/24/2018       *** minute call    ASSESSMENT/PLAN:    {No diagnosis found. (Refresh or delete this SmartLink)}    ***    No follow-ups on file. An  electronic signature was used to authenticate this note. --LOS Dalton - CNP on 12/6/2021 at 4:28 PM    {Coding Help -- Use CPT 08338-96456 with EM elements or Time rules for Office Visits.:56848}    Pursuant to the emergency declaration under the [facility], 1135 waiver authority and the 115 network disks and [employer] Act, this Virtual  Visit was conducted, with patient's consent, to reduce the patient's risk of exposure to COVID-19 and provide continuity of care for an established patient.

## 2022-01-03 ENCOUNTER — OFFICE VISIT (OUTPATIENT)
Dept: FAMILY MEDICINE CLINIC | Age: 62
End: 2022-01-03
Payer: COMMERCIAL

## 2022-01-03 VITALS
SYSTOLIC BLOOD PRESSURE: 138 MMHG | TEMPERATURE: 97.9 F | HEIGHT: 61 IN | HEART RATE: 102 BPM | OXYGEN SATURATION: 99 % | DIASTOLIC BLOOD PRESSURE: 82 MMHG | WEIGHT: 194 LBS | BODY MASS INDEX: 36.63 KG/M2

## 2022-01-03 DIAGNOSIS — L68.0 HIRSUTISM: Primary | ICD-10-CM

## 2022-01-03 DIAGNOSIS — I10 ESSENTIAL HYPERTENSION: ICD-10-CM

## 2022-01-03 DIAGNOSIS — I10 PRIMARY HYPERTENSION: ICD-10-CM

## 2022-01-03 PROBLEM — R74.8 ABNORMAL LEVELS OF OTHER SERUM ENZYMES: Status: ACTIVE | Noted: 2018-02-05

## 2022-01-03 PROCEDURE — 99214 OFFICE O/P EST MOD 30 MIN: CPT | Performed by: FAMILY MEDICINE

## 2022-01-03 RX ORDER — SPIRONOLACTONE 25 MG/1
25 TABLET ORAL 2 TIMES DAILY
Qty: 60 TABLET | Refills: 2 | Status: SHIPPED | OUTPATIENT
Start: 2022-01-03

## 2022-01-03 RX ORDER — LISINOPRIL 40 MG/1
TABLET ORAL
Qty: 180 TABLET | Refills: 3 | Status: SHIPPED | OUTPATIENT
Start: 2022-01-03

## 2022-01-03 RX ORDER — SPIRONOLACTONE 25 MG/1
25 TABLET ORAL 2 TIMES DAILY
Qty: 60 TABLET | Refills: 5 | Status: SHIPPED | OUTPATIENT
Start: 2022-01-03 | End: 2022-01-03 | Stop reason: SDUPTHER

## 2022-01-03 RX ORDER — HYDROCHLOROTHIAZIDE 25 MG/1
25 TABLET ORAL EVERY MORNING
Qty: 90 TABLET | Refills: 3 | Status: SHIPPED | OUTPATIENT
Start: 2022-01-03

## 2022-01-03 SDOH — ECONOMIC STABILITY: FOOD INSECURITY: WITHIN THE PAST 12 MONTHS, THE FOOD YOU BOUGHT JUST DIDN'T LAST AND YOU DIDN'T HAVE MONEY TO GET MORE.: NEVER TRUE

## 2022-01-03 SDOH — ECONOMIC STABILITY: FOOD INSECURITY: WITHIN THE PAST 12 MONTHS, YOU WORRIED THAT YOUR FOOD WOULD RUN OUT BEFORE YOU GOT MONEY TO BUY MORE.: NEVER TRUE

## 2022-01-03 ASSESSMENT — SOCIAL DETERMINANTS OF HEALTH (SDOH): HOW HARD IS IT FOR YOU TO PAY FOR THE VERY BASICS LIKE FOOD, HOUSING, MEDICAL CARE, AND HEATING?: NOT HARD AT ALL

## 2022-01-03 NOTE — PROGRESS NOTES
Diagnosis Orders   1. Hirsutism  spironolactone (ALDACTONE) 25 MG tablet    DISCONTINUED: spironolactone (ALDACTONE) 25 MG tablet   2. Essential hypertension  lisinopril (PRINIVIL;ZESTRIL) 40 MG tablet   3. Primary hypertension  hydroCHLOROthiazide (HYDRODIURIL) 25 MG tablet     Return in about 4 weeks (around 1/31/2022) for for review of outcome of today's recommendation. There are no Patient Instructions on file for this visit. Subjective:      Patient ID: Sumi Patterson is a 63 y.o. female who presents for:  Chief Complaint   Patient presents with    Discuss Medications    Health Maintenance     Mammo declined     Skin Problem     face rash & arms - follow up        Pt states swelling on face and feels hair growing out is as sharp as fiberglass. She shaves and it gets worse. No prior spironolactone treatment. Similar things happen on her arms    Depilatory agents do not help. Has not tried waxing      No current outpatient medications on file prior to visit. No current facility-administered medications on file prior to visit.      Past Medical History:   Diagnosis Date    Alcoholic fatty liver 21/49/1072    By  Liver U/S 12/09/14    Arthritis     Borderline hyperlipidemia 11/13/2014    has never been on meds    Bunion, right foot     Chronic back pain     Chronic neck pain     Degenerative disc disease, lumbar 11/13/2014    L5-S1    Dermatitis     Elevated liver enzymes 11/13/2014    History of alcohol abuse 12/3/2014    Hypertension     meds > 5 yrs    Hypochloremia 11/13/2014    Hypokalemia     Hyponatremia     Lateral epicondylitis 3/14/2016     Updating Deprecated Diagnoses    Lumbar herniated disc     Obesity (BMI 30-39.9) 11/6/2014    Pedal edema     Restless leg syndrome     RLS (restless legs syndrome)     Thrombocytosis     Tinea pedis 12/16/2014    Toenail fungus     UTI (lower urinary tract infection) 11/19/2014     Past Surgical History:   Procedure Laterality Date    BACK SURGERY  02/27/2012    Dr. Becka Stein / L 4 disc OR    BUNIONECTOMY Right 12/15/2016    CCJIMBO BUNDY DPJULIO CÉSAR  [address], LAPAROSCOPIC  02/05/2018    Dr. Duy Velazquez [facility] W/ARTHRD N/A 9/20/2018    L5-S1 PLIF (POSTERIOR LUMBAR INTERBODY FUSION) performed by Amanda Huffman MD at [place] [address] 4 disc OR    TONSILLECTOMY       Social History     Socioeconomic History    Marital status:      Spouse name: Not on file    Number of children: 2    Years of education: Not on file    Highest education level: Not on file   Occupational History    Occupation: unemployed   Tobacco Use    Smoking status: Never Smoker    Smokeless tobacco: Never Used    Tobacco comment: pt states that she smoked maybe 5 cigarettes in  her teens. Substance and Sexual Activity    Alcohol use: Yes     Comment: social    Drug use: No    Sexual activity: Yes     Partners: Male   Other Topics Concern    Not on file   Social History Narrative    Not on file     Social Determinants of Health     Financial Resource Strain: Low Risk     Difficulty of Paying Living Expenses: Not hard at all   Food Insecurity: No Food Insecurity    Worried About Running Out of Food in the Last Year: Never true    [address] in the Last Year: Never true   Transportation Needs:     Lack of Transportation (Medical): Not on file    Lack of Transportation (Non-Medical):  Not on file   Physical Activity:     Days of Exercise per Week: Not on file    Minutes of Exercise per Session: Not on file   Stress:     Feeling of Stress : Not on file   Social Connections:     Frequency of Communication with Friends and Family: Not on file    Frequency of Social Gatherings with Friends and Family: Not on file    Attends Restorationist Services: Not on file    Active Member of Clubs or Organizations: Not on file    Attends Club or Organization Meetings: Not on file    Marital Status: Not on file   Intimate Partner Violence:     Fear of Current or Ex-Partner: Not on file    Emotionally Abused: Not on file    Physically Abused: Not on file    Sexually Abused: Not on file   Housing Stability:     Unable to Pay for Housing in the Last Year: Not on file    Number of Places Lived in the Last Year: Not on file    Unstable Housing in the Last Year: Not on file     Family History   Problem Relation Age of Onset    High Blood Pressure Mother     Arthritis Mother     High Blood Pressure Father     Arthritis Father     High Blood Pressure Sister     Depression Sister     High Blood Pressure Brother     Arthritis Daughter         fibromyalgia     Allergies:  Amlodipine, Lyrica [pregabalin], and Toprol xl [metoprolol]    Review of Systems   Constitutional: Negative for chills and fever. Allergic/Immunologic: Negative for environmental allergies, food allergies and immunocompromised state. Hematological: Negative for adenopathy. Does not bruise/bleed easily. Psychiatric/Behavioral: Negative for behavioral problems and sleep disturbance. Objective:   /82   Pulse 102   Temp 97.9 °F (36.6 °C)   Ht 5' 1\" (1.549 m)   Wt 194 lb (88 kg)   LMP 09/19/2012   SpO2 99%   BMI 36.66 kg/m²     Physical Exam  Constitutional:       General: She is not in acute distress. Appearance: Normal appearance. She is well-developed. She is not toxic-appearing. HENT:      Head: Normocephalic and atraumatic. Right Ear: Hearing and tympanic membrane normal.      Left Ear: Hearing and tympanic membrane normal.      Nose: Nose normal. No nasal deformity. Eyes:      General: Lids are normal.         Right eye: No discharge. Left eye: No discharge. Conjunctiva/sclera: Conjunctivae normal.      Pupils: Pupils are equal, round, and reactive to light. Neck:      Thyroid: No thyroid mass or thyromegaly. Vascular: No JVD. Trachea: Trachea and phonation normal.   Cardiovascular:      Rate and Rhythm: Normal rate and regular rhythm. Pulmonary:      Effort: No accessory muscle usage or respiratory distress. Musculoskeletal:      Cervical back: Full passive range of motion without pain. Comments: FROM all large muscle groups and joints as witnessed when walking to exam table, getting on, and getting off the exam table. Skin:     General: Skin is warm and dry. Findings: No rash. Comments: Refer to pictures below   Neurological:      Mental Status: She is alert. Motor: No tremor or atrophy. Gait: Gait normal.   Psychiatric:         Speech: Speech normal.         Behavior: Behavior normal.         Thought Content: Thought content normal.     lesions after pulling hairs out of face             No results found for this visit on 01/03/22. No results found for this or any previous visit (from the past 2016 hour(s)). [] Pt was seen by provider for      Minutes  Counseling and coordination of care was done for all assessment diagnosis listed for today with patient and any family/friend present. More than 50% of this visit was spent coordinating cuurent care, obtaining information for prior records, and counseling for current plan of action. Assessment:       Diagnosis Orders   1. Hirsutism  spironolactone (ALDACTONE) 25 MG tablet    DISCONTINUED: spironolactone (ALDACTONE) 25 MG tablet   2. Essential hypertension  lisinopril (PRINIVIL;ZESTRIL) 40 MG tablet   3. Primary hypertension  hydroCHLOROthiazide (HYDRODIURIL) 25 MG tablet         No orders of the defined types were placed in this encounter.       Orders Placed This Encounter   Medications    DISCONTD: spironolactone (ALDACTONE) 25 MG tablet     Sig: Take 1 tablet by mouth 2 times daily     Dispense:  60 tablet     Refill:  5    hydroCHLOROthiazide (HYDRODIURIL) 25 MG tablet     Sig: Take 1 tablet by mouth every morning     Dispense: 90 tablet     Refill:  3    lisinopril (PRINIVIL;ZESTRIL) 40 MG tablet     Sig: TAKE 1 TABLET BY MOUTH TWICE DAILY     Dispense:  180 tablet     Refill:  3    spironolactone (ALDACTONE) 25 MG tablet     Sig: Take 1 tablet by mouth 2 times daily     Dispense:  60 tablet     Refill:  2          Medication List          Accurate as of January 3, 2022 11:59 PM. If you have any questions, ask your nurse or doctor. START taking these medications    spironolactone 25 MG tablet  Commonly known as: ALDACTONE  Take 1 tablet by mouth 2 times daily  Started by: Eamon Solis MD        CONTINUE taking these medications    folic acid 1 MG tablet  Commonly known as: FOLVITE  Take 1 tablet by mouth daily     hydroCHLOROthiazide 25 MG tablet  Commonly known as: HYDRODIURIL  Take 1 tablet by mouth every morning     lisinopril 40 MG tablet  Commonly known as: PRINIVIL;ZESTRIL  TAKE 1 TABLET BY MOUTH TWICE DAILY     vitamin B-12 1000 MCG tablet  Commonly known as: CYANOCOBALAMIN  Take 1 tablet by mouth daily        STOP taking these medications    Ciclopirox 0.77 % gel  Commonly known as: LOPROX  Stopped by: Eamon Solis MD     DULoxetine 60 MG extended release capsule  Commonly known as: CYMBALTA  Stopped by: Eamon Solis MD           Where to Get Your Medications      These medications were sent to [address] [place] [address] [address] [place] [address]    Phone: [phone]   · hydroCHLOROthiazide 25 MG tablet  · lisinopril 40 MG tablet  · spironolactone 25 MG tablet           Plan:   Return in about 4 weeks (around 1/31/2022) for for review of outcome of today's recommendation. There are no Patient Instructions on file for this visit. This note was partially created with the assistance of dictation. This may lead to grammatical or spelling errors. Brandt Ramires M.D.

## 2022-01-31 ENCOUNTER — OFFICE VISIT (OUTPATIENT)
Dept: FAMILY MEDICINE CLINIC | Age: 62
End: 2022-01-31
Payer: COMMERCIAL

## 2022-01-31 VITALS
HEART RATE: 90 BPM | DIASTOLIC BLOOD PRESSURE: 60 MMHG | WEIGHT: 194 LBS | OXYGEN SATURATION: 98 % | TEMPERATURE: 98.2 F | BODY MASS INDEX: 36.63 KG/M2 | HEIGHT: 61 IN | SYSTOLIC BLOOD PRESSURE: 104 MMHG

## 2022-01-31 DIAGNOSIS — F41.9 ANXIETY: ICD-10-CM

## 2022-01-31 DIAGNOSIS — L28.0 NEURODERMATITIS: ICD-10-CM

## 2022-01-31 DIAGNOSIS — E66.9 OBESITY (BMI 30-39.9): ICD-10-CM

## 2022-01-31 DIAGNOSIS — L68.0 HIRSUTISM: Primary | ICD-10-CM

## 2022-01-31 PROCEDURE — 99214 OFFICE O/P EST MOD 30 MIN: CPT | Performed by: FAMILY MEDICINE

## 2022-01-31 RX ORDER — BUPROPION HYDROCHLORIDE 150 MG/1
150 TABLET, EXTENDED RELEASE ORAL DAILY
Qty: 30 TABLET | Refills: 3 | Status: SHIPPED | OUTPATIENT
Start: 2022-01-31 | End: 2022-06-01

## 2022-01-31 RX ORDER — HYDROXYZINE HYDROCHLORIDE 25 MG/1
25 TABLET, FILM COATED ORAL EVERY 8 HOURS PRN
Qty: 90 TABLET | Refills: 0 | Status: SHIPPED | OUTPATIENT
Start: 2022-01-31 | End: 2022-03-09

## 2022-01-31 NOTE — PROGRESS NOTES
Diagnosis Orders   1. Hirsutism     2. Obesity (BMI 30-39.9)     3. Anxiety  buPROPion (WELLBUTRIN SR) 150 MG extended release tablet    hydrOXYzine (ATARAX) 25 MG tablet   4. Neurodermatitis       Return in about 3 weeks (around 2/21/2022) for for review of outcome of today's recommendation. Patient Instructions   Patient will continue spironolactone twice a day. Patient will initiate Atarax/hydroxyzine 25 mg 1 every 8 hours as needed for itching. Keep in mind this can be sedating. After she is on a successful dose of this medication for 2 or 3 days she may then add the next medication. Wellbutrin/bupropion  mg every morning. Subjective:      Patient ID: Humberto Salmon is a 63 y.o. female who presents for:  Chief Complaint   Patient presents with    Rash     states that the sores on her arms are not getting any better.  Discuss Medications     states that the medication that was given to her for her face seems to be working.  Health Maintenance     declining mammo       No further headache facial hair. Medication seems to be working well so far. Patient is very upset about her weight. Due to multiple medical conditions that have occurred she is gained a lot of weight she is very upset about it though according to her home scale she has been losing weight as they do working on her diet. She knows this is related to her back and her depression and anxiety. She is having increased itching and sores on her back and arms and she knows that is also related to picking and anxiety.   She would like to address these issues      Current Outpatient Medications on File Prior to Visit   Medication Sig Dispense Refill    hydroCHLOROthiazide (HYDRODIURIL) 25 MG tablet Take 1 tablet by mouth every morning 90 tablet 3    lisinopril (PRINIVIL;ZESTRIL) 40 MG tablet TAKE 1 TABLET BY MOUTH TWICE DAILY 180 tablet 3    spironolactone (ALDACTONE) 25 MG tablet Take 1 tablet by mouth 2 times daily 60 tablet 2     No current facility-administered medications on file prior to visit. Past Medical History:   Diagnosis Date    Alcoholic fatty liver 54/95/6500    By  L[facility] 12/09/14    Arthritis     Borderline hyperlipidemia 11/13/2014    has never been on meds    Bunion, right foot     Chronic back pain     Chronic neck pain     Degenerative disc disease, lumbar 11/13/2014    L5-S1    Dermatitis     Elevated liver enzymes 11/13/2014    History of alcohol abuse 12/3/2014    Hypertension     meds > 5 yrs    Hypochloremia 11/13/2014    Hypokalemia     Hyponatremia     Lateral epicondylitis 3/14/2016     Updating Deprecated Diagnoses    Lumbar herniated disc     Obesity (BMI 30-39.9) 11/6/2014    Pedal edema     Restless leg syndrome     RLS (restless legs syndrome)     Thrombocytosis     Tinea pedis 12/16/2014    Toenail fungus     UTI (lower urinary tract infection) 11/19/2014     Past Surgical History:   Procedure Laterality Date    BACK SURGERY  02/27/2012    Dr. Analisa Rashid / [facility] 4 disc OR    BUNIONECTOMY Right 12/15/2016    [facility]  [address] [facility], LAPAROSCOPIC  02/05/2018    Dr. Cyndee Small [facility] W/ARTHRD N/A 9/20/2018    L5-S1 PLIF (POSTERIOR LUMBAR INTERBODY FUSION) performed by Barak Clements MD at [place] [address] 4 disc OR    TONSILLECTOMY       Social History     Socioeconomic History    Marital status:      Spouse name: Not on file    Number of children: 2    Years of education: Not on file    Highest education level: Not on file   Occupational History    Occupation: unemployed   Tobacco Use    Smoking status: Never Smoker    Smokeless tobacco: Never Used    Tobacco comment: pt states that she smoked maybe 5 cigarettes in  her teens.    Substance and Sexual Activity    Alcohol use: Yes     Comment: social    Drug use: No    Sexual activity: Yes immunocompromised state. Hematological: Negative for adenopathy. Does not bruise/bleed easily. Psychiatric/Behavioral: Positive for dysphoric mood. Negative for behavioral problems, self-injury, sleep disturbance and suicidal ideas. The patient is nervous/anxious. Objective:   /60   Pulse 90   Temp 98.2 °F (36.8 °C) (Infrared)   Ht 5' 1\" (1.549 m)   Wt 194 lb (88 kg)   LMP 09/19/2012   SpO2 98%   Breastfeeding No   BMI 36.66 kg/m²     Physical Exam  Constitutional:       General: She is not in acute distress. Appearance: Normal appearance. She is well-developed. She is not toxic-appearing. HENT:      Head: Normocephalic and atraumatic. Right Ear: Hearing and tympanic membrane normal.      Left Ear: Hearing and tympanic membrane normal.      Nose: Nose normal. No nasal deformity. Eyes:      General: Lids are normal.         Right eye: No discharge. Left eye: No discharge. Conjunctiva/sclera: Conjunctivae normal.      Pupils: Pupils are equal, round, and reactive to light. Neck:      Thyroid: No thyroid mass or thyromegaly. Vascular: No JVD. Trachea: Trachea and phonation normal.   Cardiovascular:      Rate and Rhythm: Normal rate and regular rhythm. Pulmonary:      Effort: No accessory muscle usage or respiratory distress. Musculoskeletal:      Cervical back: Full passive range of motion without pain. Comments: FROM all large muscle groups and joints as witnessed when walking to exam table, getting on, and getting off the exam table. Skin:     General: Skin is warm and dry. Findings: No rash. Comments: Facial lesions healed. See pictures below. Multiple excoriations and open wounds on bilateral forearms. Neurological:      Mental Status: She is alert. Motor: No tremor or atrophy. Gait: Gait normal.   Psychiatric:         Speech: Speech normal.         Behavior: Behavior normal.         Thought Content:  Thought content normal.             No results found for this visit on 01/31/22. No results found for this or any previous visit (from the past 2016 hour(s)). [] Pt was seen by provider for      Minutes  Counseling and coordination of care was done for all assessment diagnosis listed for today with patient and any family/friend present. More than 50% of this visit was spent coordinating cuurent care, obtaining information for prior records, and counseling for current plan of action. Assessment:       Diagnosis Orders   1. Hirsutism     2. Obesity (BMI 30-39.9)     3. Anxiety  buPROPion (WELLBUTRIN SR) 150 MG extended release tablet    hydrOXYzine (ATARAX) 25 MG tablet   4. Neurodermatitis           No orders of the defined types were placed in this encounter. Orders Placed This Encounter   Medications    buPROPion (WELLBUTRIN SR) 150 MG extended release tablet     Sig: Take 1 tablet by mouth daily     Dispense:  30 tablet     Refill:  3    hydrOXYzine (ATARAX) 25 MG tablet     Sig: Take 1 tablet by mouth every 8 hours as needed for Itching     Dispense:  90 tablet     Refill:  0          Medication List          Accurate as of January 31, 2022  5:45 PM. If you have any questions, ask your nurse or doctor. START taking these medications    buPROPion 150 MG extended release tablet  Commonly known as:  Wellbutrin SR  Take 1 tablet by mouth daily  Started by: Prakash Reinoso MD     hydrOXYzine 25 MG tablet  Commonly known as: ATARAX  Take 1 tablet by mouth every 8 hours as needed for Itching  Started by: Prakash Reinoso MD        CONTINUE taking these medications    hydroCHLOROthiazide 25 MG tablet  Commonly known as: HYDRODIURIL  Take 1 tablet by mouth every morning     lisinopril 40 MG tablet  Commonly known as: PRINIVIL;ZESTRIL  TAKE 1 TABLET BY MOUTH TWICE DAILY     spironolactone 25 MG tablet  Commonly known as: ALDACTONE  Take 1 tablet by mouth 2 times daily        STOP taking these medications    folic acid 1 MG tablet  Commonly known as: [place] by: Joy Woodall MD     vitamin B-12 1000 MCG tablet  Commonly known as: CYANOCOBALAMIN  Stopped by: Joy Woodall MD           Where to Get Your Medications      These medications were sent to [facility]  [address] [place] [id number]    Phone: [phone]   · buPROPion 150 MG extended release tablet  · hydrOXYzine 25 MG tablet           Plan:   Return in about 3 weeks (around 2/21/2022) for for review of outcome of today's recommendation. Patient Instructions   Patient will continue spironolactone twice a day. Patient will initiate Atarax/hydroxyzine 25 mg 1 every 8 hours as needed for itching. Keep in mind this can be sedating. After she is on a successful dose of this medication for 2 or 3 days she may then add the next medication. Wellbutrin/bupropion  mg every morning. This note was partially created with the assistance of dictation. This may lead to grammatical or spelling errors. Brandt Lombardi M.D.

## 2022-01-31 NOTE — PATIENT INSTRUCTIONS
Patient will continue spironolactone twice a day. Patient will initiate Atarax/hydroxyzine 25 mg 1 every 8 hours as needed for itching. Keep in mind this can be sedating. After she is on a successful dose of this medication for 2 or 3 days she may then add the next medication. Wellbutrin/bupropion  mg every morning.

## 2022-03-08 DIAGNOSIS — F41.9 ANXIETY: ICD-10-CM

## 2022-03-09 ENCOUNTER — OFFICE VISIT (OUTPATIENT)
Dept: FAMILY MEDICINE CLINIC | Age: 62
End: 2022-03-09
Payer: COMMERCIAL

## 2022-03-09 VITALS
TEMPERATURE: 98 F | HEIGHT: 61 IN | SYSTOLIC BLOOD PRESSURE: 124 MMHG | OXYGEN SATURATION: 98 % | DIASTOLIC BLOOD PRESSURE: 72 MMHG | BODY MASS INDEX: 36.06 KG/M2 | WEIGHT: 191 LBS | HEART RATE: 74 BPM

## 2022-03-09 DIAGNOSIS — Z12.31 BREAST CANCER SCREENING BY MAMMOGRAM: ICD-10-CM

## 2022-03-09 DIAGNOSIS — L28.0 NEURODERMATITIS: ICD-10-CM

## 2022-03-09 DIAGNOSIS — E66.9 OBESITY (BMI 30-39.9): ICD-10-CM

## 2022-03-09 DIAGNOSIS — F41.9 ANXIETY: ICD-10-CM

## 2022-03-09 DIAGNOSIS — I10 PRIMARY HYPERTENSION: Primary | ICD-10-CM

## 2022-03-09 DIAGNOSIS — L29.9 ITCHING: ICD-10-CM

## 2022-03-09 DIAGNOSIS — L68.0 HIRSUTISM: ICD-10-CM

## 2022-03-09 PROCEDURE — 99214 OFFICE O/P EST MOD 30 MIN: CPT | Performed by: FAMILY MEDICINE

## 2022-03-09 RX ORDER — HYDROXYZINE HYDROCHLORIDE 25 MG/1
25 TABLET, FILM COATED ORAL EVERY 8 HOURS PRN
Qty: 90 TABLET | Refills: 0 | Status: SHIPPED | OUTPATIENT
Start: 2022-03-09 | End: 2022-04-08

## 2022-03-09 RX ORDER — TRIAMCINOLONE ACETONIDE 1 MG/G
CREAM TOPICAL
Qty: 454 G | Refills: 0 | Status: SHIPPED | OUTPATIENT
Start: 2022-03-09

## 2022-03-09 RX ORDER — IVERMECTIN 3 MG/1
15 TABLET ORAL ONCE
Qty: 5 TABLET | Refills: 0 | Status: SHIPPED | OUTPATIENT
Start: 2022-03-09 | End: 2022-03-09

## 2022-03-09 RX ORDER — QUETIAPINE FUMARATE 25 MG/1
TABLET, FILM COATED ORAL
Qty: 60 TABLET | Refills: 3 | Status: SHIPPED | OUTPATIENT
Start: 2022-03-09

## 2022-03-09 ASSESSMENT — PATIENT HEALTH QUESTIONNAIRE - PHQ9
SUM OF ALL RESPONSES TO PHQ QUESTIONS 1-9: 0
SUM OF ALL RESPONSES TO PHQ QUESTIONS 1-9: 0
2. FEELING DOWN, DEPRESSED OR HOPELESS: 0
SUM OF ALL RESPONSES TO PHQ QUESTIONS 1-9: 0
SUM OF ALL RESPONSES TO PHQ9 QUESTIONS 1 & 2: 0
SUM OF ALL RESPONSES TO PHQ QUESTIONS 1-9: 0
1. LITTLE INTEREST OR PLEASURE IN DOING THINGS: 0

## 2022-03-09 NOTE — PROGRESS NOTES
Diagnosis Orders   1. Primary hypertension  Basic Metabolic Panel    Lipid, Fasting    CBC   2. Breast cancer screening by mammogram  ARNOL DIGITAL SCREEN W OR WO CAD BILATERAL   3. Hirsutism     4. Anxiety  QUEtiapine (SEROQUEL) 25 MG tablet   5. Itching  triamcinolone (KENALOG) 0.1 % cream    QUEtiapine (SEROQUEL) 25 MG tablet   6. Neurodermatitis  QUEtiapine (SEROQUEL) 25 MG tablet    ivermectin 3 MG tablet   7. Obesity (BMI 30-39. 9)       Return in 10 days (on 3/19/2022) for female exam due. Patient Instructions   Pt will take ivermectin one time dose for potential demodex folliculitis. traimcinolone for facial lesion    Pt will initiate exercise ball pelvic tilts to improve back pain. Avoid crunches. Initiate seroquel 25 mg nightly the night after taking ivermectin for 2 nights then increase seroquel to 50 mg nightly. Do not take benadryl or atarax with this at night    Atarax only duing the day now. Patient Education        Learning About Hirsutism  What is hirsutism? Hirsutism (say \"HER-amelia-tiz-um\") is excess hair on a woman's face or body. It can run in a woman's family. Most of the time, hirsutism is not caused by a medical problem. But once in a while, hirsutism can be a sign of a health problem. What are the symptoms? Symptoms of hirsutism include extra hair that grows on a woman's face, like it does on a man's face. Or it grows on the body, especially the chest and back. The hair is dark and coarse. What causes hirsutism? Hirsutism is common in women who have polycystic ovary syndrome (PCOS). This syndrome affects your hormone balance, ovulation, and menstrual periods. In some women, hirsutism may be caused by higher-than-normal levels of male hormones called androgens. These hormones are found in both men and women, though men have a lot more of them. In women, androgens are produced by the ovaries or the adrenal glands.   But some women with hirsutism don't have PCOS or any other cause that can be found. Their hormone levels are normal, and so are their menstrual cycles. These women may have been born with hair follicles that are more sensitive to androgens. Hirsutism may also occur in some women who have diabetes or who are obese. In rare cases, the ovaries or adrenal glands may have a problem that can cause this hair growth. How is it treated? Your doctor may want to do some tests to find out if a medical problem is causing your excess hair growth. If the cause is not a medical problem, treating it is often a matter of choice. Treatments include:  · Birth control pills. This is the most common treatment. Birth control pills contain hormones, so they help balance your body's hormone level. · Antiandrogens. These are prescription medicines that lower the amount of certain hormones in your body. · Topical cream. Your doctor may prescribe a cream that you rub into affected areas to slow hair growth. · Laser hair removal. This procedure uses laser treatments to heat and destroy hair follicles. Hair can be removed for good, but it may take many treatments. · Electrolysis. An electric current is applied to the hair root. This is also permanent, and it may take many treatments. It can also cause scars. If your doctor prescribed medicines, take them as directed. Be safe with medicines. Call your doctor if you think you are having a problem with your medicine. Women who have PCOS and who are overweight may be able to reduce excess hair growth by reaching a healthy weight. Home care  Some women prefer to use home treatments for unwanted hair. These treatments include:  · Using depilatories. These are over-the-counter creams that dissolve hair. They may irritate the skin. Hair growth returns. · Waxing. This treatment pulls the hair out by the root. Repeated waxing may result in less hair growth, but it can be painful and may irritate the skin. · Shaving.  Shaving does not increase hair growth, but it can cause stubble. · Tweezing. This takes a lot of time and can be painful. · Bleaching. Bleaching makes hair lighter and harder to see. New hair that grows in will be its natural color. Follow-up care is a key part of your treatment and safety. Be sure to make and go to all appointments, and call your doctor if you are having problems. It's also a good idea to know your test results and keep a list of the medicines you take. Where can you learn more? Go to https://Yoursphere MediapeGumiyo.[facility]. org and sign in to your PlasmaSi account. Enter E783 in the [employer] box to learn more about \"Learning About Hirsutism. \"     If you do not have an account, please click on the \"Sign Up Now\" link. Current as of: March 3, 2021               Content Version: 13.1  © 2006-2021 [employer]. Care instructions adapted under license by [facility] ([facility]). If you have questions about a medical condition or this instruction, always ask your healthcare professional. [place] [address] any warranty or liability for your use of this information. Subjective:      Patient ID: Zoran Munoz is a 63 y.o. female who presents for:  Chief Complaint   Patient presents with    Discuss Medications    Discuss Labs     pt dues for  labs        Pt has not noted change in hair growth yet, but she noted a lesion on her left cheek that is inflamed    conitnued itching noted but no new lesions scratched open. Pt taking bp meds without abnl. Continue to note low back pain.   Got worse when she tried to do crunches      Current Outpatient Medications on File Prior to Visit   Medication Sig Dispense Refill    buPROPion (WELLBUTRIN SR) 150 MG extended release tablet Take 1 tablet by mouth daily 30 tablet 3    hydroCHLOROthiazide (HYDRODIURIL) 25 MG tablet Take 1 tablet by mouth every morning 90 tablet 3    lisinopril (PRINIVIL;ZESTRIL) 40 MG tablet TAKE 1 TABLET BY MOUTH TWICE DAILY 180 tablet 3    spironolactone (ALDACTONE) 25 MG tablet Take 1 tablet by mouth 2 times daily 60 tablet 2    hydrOXYzine (ATARAX) 25 MG tablet TAKE 1 TABLET BY MOUTH EVERY 8 HOURS AS NEEDED FOR ITCHING 90 tablet 0     No current facility-administered medications on file prior to visit.      Past Medical History:   Diagnosis Date    Alcoholic fatty liver 83/21/6320    By  Liver U/S 12/09/14    Arthritis     Borderline hyperlipidemia 11/13/2014    has never been on meds    Bunion, right foot     Chronic back pain     Chronic neck pain     Degenerative disc disease, lumbar 11/13/2014    L5-S1    Dermatitis     Elevated liver enzymes 11/13/2014    History of alcohol abuse 12/3/2014    Hypertension     meds > 5 yrs    Hypochloremia 11/13/2014    Hypokalemia     Hyponatremia     Lateral epicondylitis 3/14/2016     Updating Deprecated Diagnoses    Lumbar herniated disc     Obesity (BMI 30-39.9) 11/6/2014    Pedal edema     Restless leg syndrome     RLS (restless legs syndrome)     Thrombocytosis     Tinea pedis 12/16/2014    Toenail fungus     UTI (lower urinary tract infection) 11/19/2014     Past Surgical History:   Procedure Laterality Date    BACK SURGERY  02/27/2012    Dr. Linwood Ruiz / L 4 disc OR    BUNIONECTOMY Right 12/15/2016    [facility]  [address] [facility]  02/05/2018    Dr. Lydia Da Silva SPC W/ARTHRD N/A 9/20/2018    L5-S1 PLIF (POSTERIOR LUMBAR INTERBODY FUSION) performed by Toney Hawkins MD at [place] [address] 4 disc OR    TONSILLECTOMY       Social History     Socioeconomic History    Marital status:      Spouse name: Not on file    Number of children: 2    Years of education: Not on file    Highest education level: Not on file   Occupational History    Occupation: unemployed   Tobacco Use    Smoking status: Never Smoker    Smokeless tobacco: Never Used    Tobacco comment: pt states that she smoked maybe 5 cigarettes in  her teens. Substance and Sexual Activity    Alcohol use: Yes     Comment: social    Drug use: No    Sexual activity: Yes     Partners: Male   Other Topics Concern    Not on file   Social History Narrative    Not on file     Social Determinants of Health     Financial Resource Strain: Low Risk     Difficulty of Paying Living Expenses: Not hard at all   Food Insecurity: No Food Insecurity    Worried About Running Out of Food in the Last Year: Never true    [address] in the Last Year: Never true   Transportation Needs:     Lack of Transportation (Medical): Not on file    Lack of Transportation (Non-Medical):  Not on file   Physical Activity:     Days of Exercise per Week: Not on file    Minutes of Exercise per Session: Not on file   Stress:     Feeling of Stress : Not on file   Social Connections:     Frequency of Communication with Friends and Family: Not on file    Frequency of Social Gatherings with Friends and Family: Not on file    Attends Advent Services: Not on file    Active Member of Clubs or Organizations: Not on file    Attends Club or Organization Meetings: Not on file    Marital Status: Not on file   Intimate Partner Violence:     Fear of Current or Ex-Partner: Not on file    Emotionally Abused: Not on file    Physically Abused: Not on file    Sexually Abused: Not on file   Housing Stability:     Unable to Pay for Housing in the Last Year: Not on file    Number of [place] in the Last Year: Not on file    Unstable Housing in the Last Year: Not on file     Family History   Problem Relation Age of Onset    High Blood Pressure Mother     Arthritis Mother     High Blood Pressure Father     Arthritis Father     High Blood Pressure Sister     Depression Sister     High Blood Pressure Brother     Arthritis Daughter         fibromyalgia     Allergies:  Amlodipine, Lyrica [pregabalin], and Toprol xl [metoprolol]    Review of Systems   Constitutional: Negative for activity change, appetite change, diaphoresis and unexpected weight change. Eyes: Negative for photophobia and visual disturbance. Respiratory: Negative for chest tightness and shortness of breath. No orthopnea   Cardiovascular: Negative for chest pain, palpitations and leg swelling. Gastrointestinal: Negative for abdominal distention and abdominal pain. Genitourinary: Negative for flank pain and frequency. Musculoskeletal: Negative for gait problem and joint swelling. Neurological: Negative for dizziness, weakness, light-headedness and headaches. Psychiatric/Behavioral: Negative for confusion. Objective:   /72   Pulse 74   Temp 98 °F (36.7 °C)   Ht 5' 1\" (1.549 m)   Wt 191 lb (86.6 kg)   LMP 09/19/2012   SpO2 98%   BMI 36.09 kg/m²     Physical Exam  Constitutional:       General: She is not in acute distress. Appearance: She is well-developed. She is not diaphoretic. HENT:      Head: Normocephalic and atraumatic. Right Ear: External ear normal.      Left Ear: External ear normal.      Nose: Nose normal.   Eyes:      General:         Right eye: No discharge. Left eye: No discharge. Conjunctiva/sclera: Conjunctivae normal.      Pupils: Pupils are equal, round, and reactive to light. Neck:      Thyroid: No thyromegaly. Trachea: No tracheal deviation. Cardiovascular:      Rate and Rhythm: Normal rate and regular rhythm. Pulmonary:      Effort: Pulmonary effort is normal. No respiratory distress. Abdominal:      General: There is no distension. Musculoskeletal:      Cervical back: Neck supple. Skin:     General: Skin is warm and dry. Findings: No bruising or rash. Comments: No new lesions on arms. R check abrasion noted   Neurological:      Mental Status: She is alert.       Coordination: Coordination normal.   Psychiatric:         Thought Content: Thought content normal.         Judgment: Judgment normal.         No results found for this visit on 03/09/22. No results found for this or any previous visit (from the past 2016 hour(s)). [] Pt was seen by provider for      Minutes  Counseling and coordination of care was done for all assessment diagnosis listed for today with patient and any family/friend present. More than 50% of this visit was spent coordinating cuurent care, obtaining information for prior records, and counseling for current plan of action. Assessment:       Diagnosis Orders   1. Primary hypertension  Basic Metabolic Panel    Lipid, Fasting    CBC   2. Breast cancer screening by mammogram  ARNOL DIGITAL SCREEN W OR WO CAD BILATERAL   3. Hirsutism     4. Anxiety  QUEtiapine (SEROQUEL) 25 MG tablet   5. Itching  triamcinolone (KENALOG) 0.1 % cream    QUEtiapine (SEROQUEL) 25 MG tablet   6. Neurodermatitis  QUEtiapine (SEROQUEL) 25 MG tablet    ivermectin 3 MG tablet   7. Obesity (BMI 30-39.9)           Orders Placed This Encounter   Procedures    ARNOL DIGITAL SCREEN W OR WO CAD BILATERAL     Standing Status:   Future     Standing Expiration Date:   5/9/2023    Basic Metabolic Panel     Standing Status:   Future     Standing Expiration Date:   3/9/2023    Lipid, Fasting     Standing Status:   Future     Standing Expiration Date:   3/9/2023    CBC     Standing Status:   Future     Standing Expiration Date:   3/9/2023       Orders Placed This Encounter   Medications    triamcinolone (KENALOG) 0.1 % cream     Sig: Apply topically 2 times daily.      Dispense:  454 g     Refill:  0    QUEtiapine (SEROQUEL) 25 MG tablet     Sig: Take one tablet nightly for 2 nights then increase to 2 tabs nightly     Dispense:  60 tablet     Refill:  3    ivermectin 3 MG tablet     Sig: Take 5 tablets by mouth once for 1 dose     Dispense:  5 tablet     Refill:  0          Medication List          Accurate as of March 9, 2022 11:59 PM. If you have any questions, ask your nurse or doctor. START taking these medications    ivermectin 3 MG tablet  Take 5 tablets by mouth once for 1 dose  Started by: Yandy Hamilton MD     QUEtiapine 25 MG tablet  Commonly known as: SEROquel  Take one tablet nightly for 2 nights then increase to 2 tabs nightly  Started by: Yandy Hamilton MD     triamcinolone 0.1 % cream  Commonly known as: KENALOG  Apply topically 2 times daily. Started by: Yandy Hamilton MD        CONTINUE taking these medications    buPROPion 150 MG extended release tablet  Commonly known as: Wellbutrin SR  Take 1 tablet by mouth daily     hydroCHLOROthiazide 25 MG tablet  Commonly known as: HYDRODIURIL  Take 1 tablet by mouth every morning     hydrOXYzine 25 MG tablet  Commonly known as: ATARAX  TAKE 1 TABLET BY MOUTH EVERY 8 HOURS AS NEEDED FOR ITCHING     lisinopril 40 MG tablet  Commonly known as: PRINIVIL;ZESTRIL  TAKE 1 TABLET BY MOUTH TWICE DAILY     spironolactone 25 MG tablet  Commonly known as: ALDACTONE  Take 1 tablet by mouth 2 times daily           Where to Get Your Medications      These medications were sent to [address] [place] [address] [address] [place] [id number]    Phone: [phone]   · ivermectin 3 MG tablet  · QUEtiapine 25 MG tablet  · triamcinolone 0.1 % cream           Plan:   Return in 10 days (on 3/19/2022) for female exam due. Patient Instructions   Pt will take ivermectin one time dose for potential demodex folliculitis. traimcinolone for facial lesion    Pt will initiate exercise ball pelvic tilts to improve back pain. Avoid crunches. Initiate seroquel 25 mg nightly the night after taking ivermectin for 2 nights then increase seroquel to 50 mg nightly. Do not take benadryl or atarax with this at night    Atarax only duing the day now. Patient Education        Learning About Hirsutism  What is hirsutism?   Hirsutism (say \"HER-amelia-tiz-um\") is excess hair on a woman's face or body. It can run in a woman's family. Most of the time, hirsutism is not caused by a medical problem. But once in a while, hirsutism can be a sign of a health problem. What are the symptoms? Symptoms of hirsutism include extra hair that grows on a woman's face, like it does on a man's face. Or it grows on the body, especially the chest and back. The hair is dark and coarse. What causes hirsutism? Hirsutism is common in women who have polycystic ovary syndrome (PCOS). This syndrome affects your hormone balance, ovulation, and menstrual periods. In some women, hirsutism may be caused by higher-than-normal levels of male hormones called androgens. These hormones are found in both men and women, though men have a lot more of them. In women, androgens are produced by the ovaries or the adrenal glands. But some women with hirsutism don't have PCOS or any other cause that can be found. Their hormone levels are normal, and so are their menstrual cycles. These women may have been born with hair follicles that are more sensitive to androgens. Hirsutism may also occur in some women who have diabetes or who are obese. In rare cases, the ovaries or adrenal glands may have a problem that can cause this hair growth. How is it treated? Your doctor may want to do some tests to find out if a medical problem is causing your excess hair growth. If the cause is not a medical problem, treating it is often a matter of choice. Treatments include:  · Birth control pills. This is the most common treatment. Birth control pills contain hormones, so they help balance your body's hormone level. · Antiandrogens. These are prescription medicines that lower the amount of certain hormones in your body. · Topical cream. Your doctor may prescribe a cream that you rub into affected areas to slow hair growth.   · Laser hair removal. This procedure uses laser treatments to heat and destroy hair follicles. Hair can be removed for good, but it may take many treatments. · Electrolysis. An electric current is applied to the hair root. This is also permanent, and it may take many treatments. It can also cause scars. If your doctor prescribed medicines, take them as directed. Be safe with medicines. Call your doctor if you think you are having a problem with your medicine. Women who have PCOS and who are overweight may be able to reduce excess hair growth by reaching a healthy weight. Home care  Some women prefer to use home treatments for unwanted hair. These treatments include:  · Using depilatories. These are over-the-counter creams that dissolve hair. They may irritate the skin. Hair growth returns. · Waxing. This treatment pulls the hair out by the root. Repeated waxing may result in less hair growth, but it can be painful and may irritate the skin. · Shaving. Shaving does not increase hair growth, but it can cause stubble. · Tweezing. This takes a lot of time and can be painful. · Bleaching. Bleaching makes hair lighter and harder to see. New hair that grows in will be its natural color. Follow-up care is a key part of your treatment and safety. Be sure to make and go to all appointments, and call your doctor if you are having problems. It's also a good idea to know your test results and keep a list of the medicines you take. Where can you learn more? Go to https://chkuldeepeweb.[employer]. org and sign in to your [email or web address] account. Enter B904 in the [facility] box to learn more about \"Learning About Hirsutism. \"     If you do not have an account, please click on the \"Sign Up Now\" link. Current as of: March 3, 2021               Content Version: 13.1  © 9739-5426 [employer]. Care instructions adapted under license by [facility] ([facility]).  If you have questions about a medical condition or this instruction, always ask your healthcare professional. [employer] disclaims any warranty or liability for your use of this information. This note was partially created with the assistance of dictation. This may lead to grammatical or spelling errors. Brandt Maloney M.D.

## 2022-03-09 NOTE — PROGRESS NOTES
1. Hirsutism      2. Obesity (BMI 30-39.9)      3. Anxiety  buPROPion (WELLBUTRIN SR) 150 MG extended release tablet     hydrOXYzine (ATARAX) 25 MG tablet   4. Neurodermatitis         Return in about 3 weeks (around 2/21/2022) for for review of outcome of today's recommendation. Patient Instructions   Patient will continue spironolactone twice a day. Patient will initiate Atarax/hydroxyzine 25 mg 1 every 8 hours as needed for itching. Keep in mind this can be sedating. After she is on a successful dose of this medication for 2 or 3 days she may then add the next medication. Wellbutrin/bupropion  mg every morning.

## 2022-03-09 NOTE — PATIENT INSTRUCTIONS
Pt will take ivermectin one time dose for potential demodex folliculitis. traimcinolone for facial lesion    Pt will initiate exercise ball pelvic tilts to improve back pain. Avoid crunches. Initiate seroquel 25 mg nightly the night after taking ivermectin for 2 nights then increase seroquel to 50 mg nightly. Do not take benadryl or atarax with this at night    Atarax only duing the day now. Patient Education        Learning About Hirsutism  What is hirsutism? Hirsutism (say \"HER-amelia-tiz-um\") is excess hair on a woman's face or body. It can run in a woman's family. Most of the time, hirsutism is not caused by a medical problem. But once in a while, hirsutism can be a sign of a health problem. What are the symptoms? Symptoms of hirsutism include extra hair that grows on a woman's face, like it does on a man's face. Or it grows on the body, especially the chest and back. The hair is dark and coarse. What causes hirsutism? Hirsutism is common in women who have polycystic ovary syndrome (PCOS). This syndrome affects your hormone balance, ovulation, and menstrual periods. In some women, hirsutism may be caused by higher-than-normal levels of male hormones called androgens. These hormones are found in both men and women, though men have a lot more of them. In women, androgens are produced by the ovaries or the adrenal glands. But some women with hirsutism don't have PCOS or any other cause that can be found. Their hormone levels are normal, and so are their menstrual cycles. These women may have been born with hair follicles that are more sensitive to androgens. Hirsutism may also occur in some women who have diabetes or who are obese. In rare cases, the ovaries or adrenal glands may have a problem that can cause this hair growth. How is it treated? Your doctor may want to do some tests to find out if a medical problem is causing your excess hair growth.  If the cause is not a medical problem, treating it is often a matter of choice. Treatments include:  · Birth control pills. This is the most common treatment. Birth control pills contain hormones, so they help balance your body's hormone level. · Antiandrogens. These are prescription medicines that lower the amount of certain hormones in your body. · Topical cream. Your doctor may prescribe a cream that you rub into affected areas to slow hair growth. · Laser hair removal. This procedure uses laser treatments to heat and destroy hair follicles. Hair can be removed for good, but it may take many treatments. · Electrolysis. An electric current is applied to the hair root. This is also permanent, and it may take many treatments. It can also cause scars. If your doctor prescribed medicines, take them as directed. Be safe with medicines. Call your doctor if you think you are having a problem with your medicine. Women who have PCOS and who are overweight may be able to reduce excess hair growth by reaching a healthy weight. Home care  Some women prefer to use home treatments for unwanted hair. These treatments include:  · Using depilatories. These are over-the-counter creams that dissolve hair. They may irritate the skin. Hair growth returns. · Waxing. This treatment pulls the hair out by the root. Repeated waxing may result in less hair growth, but it can be painful and may irritate the skin. · Shaving. Shaving does not increase hair growth, but it can cause stubble. · Tweezing. This takes a lot of time and can be painful. · Bleaching. Bleaching makes hair lighter and harder to see. New hair that grows in will be its natural color. Follow-up care is a key part of your treatment and safety. Be sure to make and go to all appointments, and call your doctor if you are having problems. It's also a good idea to know your test results and keep a list of the medicines you take. Where can you learn more? Go to [email or web address] and sign in to your [facility] account. Enter Y555 in the [facility] box to learn more about \"Learning About Hirsutism. \"     If you do not have an account, please click on the \"Sign Up Now\" link. Current as of: March 3, 2021               Content Version: 13.1  © 3532-3082 [employer], [employer]. Care instructions adapted under license by [facility] ([facility]). If you have questions about a medical condition or this instruction, always ask your healthcare professional. [address] any warranty or liability for your use of this information.

## 2022-03-09 NOTE — TELEPHONE ENCOUNTER
Future Appointments    Encounter Information    Provider Department Appt Notes   3/9/2022 Fabi Loya MD [facility] Primary Care 1.5 month follow up       Past Visits    Date Provider Specialty Visit Type Primary Dx   01/31/2022 Fabi Loya MD Family Medicine Office Visit Hirsutism

## 2022-03-10 DIAGNOSIS — I10 PRIMARY HYPERTENSION: ICD-10-CM

## 2022-03-10 LAB
ANION GAP SERPL CALCULATED.3IONS-SCNC: 16 MEQ/L (ref 9–15)
BUN BLDV-MCNC: 20 MG/DL (ref 6–20)
CALCIUM SERPL-MCNC: 9.7 MG/DL (ref 8.5–9.9)
CHLORIDE BLD-SCNC: 100 MEQ/L (ref 95–107)
CHOLESTEROL, FASTING: 200 MG/DL (ref 0–199)
CO2: 22 MEQ/L (ref 20–31)
CREAT SERPL-MCNC: 1.15 MG/DL (ref 0.5–0.9)
GFR AFRICAN AMERICAN: 58.3
GFR NON-AFRICAN AMERICAN: 48.2
GLUCOSE BLD-MCNC: 82 MG/DL (ref 70–99)
HCT VFR BLD CALC: 36.4 % (ref 37–47)
HDLC SERPL-MCNC: 59 MG/DL (ref 40–59)
HEMOGLOBIN: 12.1 G/DL (ref 12–16)
LDL CHOLESTEROL CALCULATED: 120 MG/DL (ref 0–129)
MCH RBC QN AUTO: 31.1 PG (ref 27–31.3)
MCHC RBC AUTO-ENTMCNC: 33.3 % (ref 33–37)
MCV RBC AUTO: 93.4 FL (ref 82–100)
PDW BLD-RTO: 12 % (ref 11.5–14.5)
PLATELET # BLD: 395 K/UL (ref 130–400)
POTASSIUM SERPL-SCNC: 4.9 MEQ/L (ref 3.4–4.9)
RBC # BLD: 3.9 M/UL (ref 4.2–5.4)
SODIUM BLD-SCNC: 138 MEQ/L (ref 135–144)
TRIGLYCERIDE, FASTING: 103 MG/DL (ref 0–150)
WBC # BLD: 6.8 K/UL (ref 4.8–10.8)

## 2022-03-10 ASSESSMENT — ENCOUNTER SYMPTOMS
CHEST TIGHTNESS: 0
PHOTOPHOBIA: 0
ABDOMINAL DISTENTION: 0
SHORTNESS OF BREATH: 0
ABDOMINAL PAIN: 0

## 2022-03-21 DIAGNOSIS — N28.9 RENAL INSUFFICIENCY: Primary | ICD-10-CM

## 2022-03-22 ENCOUNTER — TELEPHONE (OUTPATIENT)
Dept: FAMILY MEDICINE CLINIC | Age: 62
End: 2022-03-22

## 2022-03-22 NOTE — TELEPHONE ENCOUNTER
I called pt to confirm her appt for tomorrow, she wanted to cancel appt. She wanted to let Dr Patricia Rojas know that she never got the ivermectin filled and wanted to know if she still wanted pt to get the script filled?

## 2022-03-23 NOTE — TELEPHONE ENCOUNTER
Patient states [facility] was needing more information on the script of ivermectin sent on 3/9. Patient still hasn't been able to get script.    Please call pharmacy

## 2022-05-31 DIAGNOSIS — F41.9 ANXIETY: ICD-10-CM

## 2022-06-01 RX ORDER — BUPROPION HYDROCHLORIDE 150 MG/1
150 TABLET, EXTENDED RELEASE ORAL DAILY
Qty: 30 TABLET | Refills: 0 | Status: SHIPPED | OUTPATIENT
Start: 2022-06-01 | End: 2022-07-18 | Stop reason: ALTCHOICE

## 2022-06-03 NOTE — TELEPHONE ENCOUNTER
Pt aware  to schedule appt prior to any further refills. Pt was in the car when I spoke with her she states that she will call back to schedule.

## 2022-07-13 DIAGNOSIS — N28.9 RENAL INSUFFICIENCY: ICD-10-CM

## 2022-07-13 LAB
ANION GAP SERPL CALCULATED.3IONS-SCNC: 13 MEQ/L (ref 9–15)
BUN BLDV-MCNC: 25 MG/DL (ref 6–20)
CALCIUM SERPL-MCNC: 9.3 MG/DL (ref 8.5–9.9)
CHLORIDE BLD-SCNC: 98 MEQ/L (ref 95–107)
CO2: 26 MEQ/L (ref 20–31)
CREAT SERPL-MCNC: 1.41 MG/DL (ref 0.5–0.9)
GFR AFRICAN AMERICAN: 46
GFR NON-AFRICAN AMERICAN: 38
GLUCOSE BLD-MCNC: 87 MG/DL (ref 70–99)
POTASSIUM SERPL-SCNC: 4.3 MEQ/L (ref 3.4–4.9)
SODIUM BLD-SCNC: 137 MEQ/L (ref 135–144)

## 2022-07-18 ENCOUNTER — OFFICE VISIT (OUTPATIENT)
Dept: FAMILY MEDICINE CLINIC | Age: 62
End: 2022-07-18
Payer: COMMERCIAL

## 2022-07-18 VITALS
WEIGHT: 180 LBS | BODY MASS INDEX: 33.99 KG/M2 | DIASTOLIC BLOOD PRESSURE: 78 MMHG | SYSTOLIC BLOOD PRESSURE: 124 MMHG | TEMPERATURE: 98.2 F | HEART RATE: 94 BPM | HEIGHT: 61 IN | OXYGEN SATURATION: 98 %

## 2022-07-18 DIAGNOSIS — M25.60 STIFFNESS IN JOINT: ICD-10-CM

## 2022-07-18 DIAGNOSIS — F32.A DEPRESSION, UNSPECIFIED DEPRESSION TYPE: ICD-10-CM

## 2022-07-18 DIAGNOSIS — M48.02 CERVICAL STENOSIS OF SPINAL CANAL: ICD-10-CM

## 2022-07-18 DIAGNOSIS — G47.00 INSOMNIA, UNSPECIFIED TYPE: ICD-10-CM

## 2022-07-18 DIAGNOSIS — R10.2 PAIN OF PELVIC GIRDLE: Primary | ICD-10-CM

## 2022-07-18 PROCEDURE — 99215 OFFICE O/P EST HI 40 MIN: CPT | Performed by: FAMILY MEDICINE

## 2022-07-18 RX ORDER — PREDNISONE 10 MG/1
TABLET ORAL
Qty: 100 TABLET | Refills: 0 | Status: SHIPPED | OUTPATIENT
Start: 2022-07-18

## 2022-07-18 ASSESSMENT — ENCOUNTER SYMPTOMS
CHEST TIGHTNESS: 0
ABDOMINAL DISTENTION: 0
BACK PAIN: 1
PHOTOPHOBIA: 0
SHORTNESS OF BREATH: 0
ABDOMINAL PAIN: 0

## 2022-07-18 NOTE — PATIENT INSTRUCTIONS
Trial of steroid taper for inflammatory disease.       Labs to eval for inflammatory disorders as well as thyroid abnormality    Genesight test done to evaluate for best medication options to handle depressive symptoms

## 2022-07-18 NOTE — PROGRESS NOTES
Diagnosis Orders   1. Pain of pelvic girdle  OKSANA Screen With Reflex    Sedimentation Rate    predniSONE (DELTASONE) 10 MG tablet    Rheumatoid Factor    TSH with Reflex      2. Stiffness in joint  OKSANA Screen With Reflex    Sedimentation Rate    predniSONE (DELTASONE) 10 MG tablet    Rheumatoid Factor    TSH with Reflex      3. Cervical stenosis of spinal canal        4. Insomnia, unspecified type        5. Depression, unspecified depression type          Return for for review of outcome of today's recommendation. Patient Instructions   Trial of steroid taper for inflammatory disease. Labs to eval for inflammatory disorders as well as thyroid abnormality    Genesight test done to evaluate for best medication options to handle depressive symptoms    Subjective:      Patient ID: Ashley Wallace is a 63 y.o. female who presents for:  Chief Complaint   Patient presents with    Discuss Labs    Discuss Medications    Health Maintenance     Pt did not return for female exam - pt states that she just never got to this - more concerned for her back at this point -     Arthritis     Pt would like to know if she can have something for this & for lower back pain        Patient and  expressed concern on the amount of excessive stiffness patient is having in the morning. She is so stiff and uncomfortable that she has been using a walker to get out of bed. She states the pain is all around her hips into her groin and down her legs. Worse with sitting but also cannot very far. Also noting significant pain bilateral shoulders and into the left elbow. Reviewed patient's prior MRI and testing. Reviewed prior labs as well. Patient is very tearful and upset. Feeling discouraged. Not wanting to live. Not feeling suicidal but just not feeling like the quality of life is good right now with the amount of pain that she is having. Noticing increased irritability.     Patient had done a trial of medicine ball pelvic stability. Patient and  both stated made her symptoms worse    Patient states Wellbutrin did not help. Seroquel is helping her sleep though. Current Outpatient Medications on File Prior to Visit   Medication Sig Dispense Refill    triamcinolone (KENALOG) 0.1 % cream Apply topically 2 times daily. 454 g 0    QUEtiapine (SEROQUEL) 25 MG tablet Take one tablet nightly for 2 nights then increase to 2 tabs nightly 60 tablet 3    hydroCHLOROthiazide (HYDRODIURIL) 25 MG tablet Take 1 tablet by mouth every morning 90 tablet 3    lisinopril (PRINIVIL;ZESTRIL) 40 MG tablet TAKE 1 TABLET BY MOUTH TWICE DAILY 180 tablet 3    spironolactone (ALDACTONE) 25 MG tablet Take 1 tablet by mouth 2 times daily 60 tablet 2     No current facility-administered medications on file prior to visit.      Past Medical History:   Diagnosis Date    Alcoholic fatty liver 22/27/8375    By  Liver U/S 12/09/14    Arthritis     Borderline hyperlipidemia 11/13/2014    has never been on meds    Bunion, right foot     Chronic back pain     Chronic neck pain     Degenerative disc disease, lumbar 11/13/2014    L5-S1    Dermatitis     Elevated liver enzymes 11/13/2014    History of alcohol abuse 12/3/2014    Hypertension     meds > 5 yrs    Hypochloremia 11/13/2014    Hypokalemia     Hyponatremia     Lateral epicondylitis 3/14/2016     Updating Deprecated Diagnoses    Lumbar herniated disc     Obesity (BMI 30-39.9) 11/6/2014    Pedal edema     Restless leg syndrome     RLS (restless legs syndrome)     Thrombocytosis     Tinea pedis 12/16/2014    Toenail fungus     UTI (lower urinary tract infection) 11/19/2014     Past Surgical History:   Procedure Laterality Date    BACK SURGERY  02/27/2012    Dr. Hernandez Huitron / [facility] 4 disc OR    BUNIONECTOMY Right 12/15/2016    [facility]IE BUN[facility] DPM  [address]  02/05/2018    Dr. Huyen Arreguin W/OMAR N/A 9/20/2018    L5-S1 PLIF (POSTERIOR LUMBAR INTERBODY FUSION) performed by Marta Hanley MD at [address] 4 disc OR    TONSILLECTOMY       Social History     Socioeconomic History    Marital status:      Spouse name: Not on file    Number of children: 2    Years of education: Not on file    Highest education level: Not on file   Occupational History    Occupation: unemployed   Tobacco Use    Smoking status: Never    Smokeless tobacco: Never    Tobacco comments:     pt states that she smoked maybe 5 cigarettes in  her teens. Substance and Sexual Activity    Alcohol use: Yes     Comment: social    Drug use: No    Sexual activity: Yes     Partners: Male   Other Topics Concern    Not on file   Social History Narrative    Not on file     Social Determinants of Health     Financial Resource Strain: Low Risk     Difficulty of Paying Living Expenses: Not hard at all   Food Insecurity: No Food Insecurity    Worried About Running Out of Food in the Last Year: Never true    Ran Out of Food in the Last Year: Never true   Transportation Needs: Not on file   Physical Activity: Not on file   Stress: Not on file   Social Connections: Not on file   Intimate Partner Violence: Not on file   Housing Stability: Not on file     Family History   Problem Relation Age of Onset    High Blood Pressure Mother     Arthritis Mother     High Blood Pressure Father     Arthritis Father     High Blood Pressure Sister     Depression Sister     High Blood Pressure Brother     Arthritis Daughter         fibromyalgia     Allergies:  Amlodipine, Lyrica [pregabalin], and Toprol xl [metoprolol]    Review of Systems   Constitutional:  Negative for activity change, appetite change, diaphoresis and unexpected weight change. Eyes:  Negative for photophobia and visual disturbance. Respiratory:  Negative for chest tightness and shortness of breath.          No orthopnea   Cardiovascular:  Negative for chest pain, palpitations and leg swelling. Gastrointestinal:  Negative for abdominal distention and abdominal pain. Genitourinary:  Negative for flank pain and frequency. Musculoskeletal:  Positive for arthralgias, back pain, myalgias and neck pain. Negative for gait problem and joint swelling. Neurological:  Negative for dizziness, weakness, light-headedness and headaches. Psychiatric/Behavioral:  Positive for dysphoric mood and sleep disturbance. Negative for confusion, self-injury and suicidal ideas. The patient is not nervous/anxious. Objective:   /78   Pulse 94   Temp 98.2 °F (36.8 °C)   Ht 5' 1\" (1.549 m)   Wt 180 lb (81.6 kg)   LMP 09/19/2012   SpO2 98%   BMI 34.01 kg/m²     Physical Exam  Constitutional:       General: She is not in acute distress. Appearance: She is well-developed. She is not diaphoretic. HENT:      Head: Normocephalic and atraumatic. Right Ear: External ear normal.      Left Ear: External ear normal.      Nose: Nose normal.   Eyes:      General:         Right eye: No discharge. Left eye: No discharge. Conjunctiva/sclera: Conjunctivae normal.      Pupils: Pupils are equal, round, and reactive to light. Neck:      Thyroid: No thyromegaly. Trachea: No tracheal deviation. Cardiovascular:      Rate and Rhythm: Normal rate and regular rhythm. Pulmonary:      Effort: Pulmonary effort is normal. No respiratory distress. Abdominal:      General: There is no distension. Musculoskeletal:      Cervical back: Neck supple. Comments: Tenderness noted bilateral shoulder girdle and pelvic girdle   Skin:     General: Skin is warm and dry. Findings: No bruising or rash. Neurological:      Mental Status: She is alert. Coordination: Coordination normal.   Psychiatric:         Thought Content: Thought content normal.         Judgment: Judgment normal.       No results found for this visit on 07/18/22.     Recent Results (from the past 2016 hour(s))   Basic Metabolic Panel    Collection Time: 07/13/22  3:40 PM   Result Value Ref Range    Sodium 137 135 - 144 mEq/L    Potassium 4.3 3.4 - 4.9 mEq/L    Chloride 98 95 - 107 mEq/L    CO2 26 20 - 31 mEq/L    Anion Gap 13 9 - 15 mEq/L    Glucose 87 70 - 99 mg/dL    BUN 25 (H) 6 - 20 mg/dL    CREATININE 1.41 (H) 0.50 - 0.90 mg/dL    GFR Non-African American 38.0 (L) >60    GFR  46.0 (L) >60    Calcium 9.3 8.5 - 9.9 mg/dL       [x] Pt was seen by provider for   40  Minutes  Counseling and coordination of care was done for all assessment diagnosis listed for today with patient and any family/friend present. More than 50% of this visit was spent coordinating current care, obtaining information for prior records, and counseling for current plan of action. Assessment:       Diagnosis Orders   1. Pain of pelvic girdle  OKSANA Screen With Reflex    Sedimentation Rate    predniSONE (DELTASONE) 10 MG tablet    Rheumatoid Factor    TSH with Reflex      2. Stiffness in joint  OKSANA Screen With Reflex    Sedimentation Rate    predniSONE (DELTASONE) 10 MG tablet    Rheumatoid Factor    TSH with Reflex      3. Cervical stenosis of spinal canal        4. Insomnia, unspecified type        5.  Depression, unspecified depression type              Orders Placed This Encounter   Procedures    OKSANA Screen With Reflex     Standing Status:   Future     Standing Expiration Date:   7/18/2023    Sedimentation Rate     Standing Status:   Future     Standing Expiration Date:   7/18/2023    Rheumatoid Factor     Standing Status:   Future     Standing Expiration Date:   7/18/2023    TSH with Reflex     Standing Status:   Future     Standing Expiration Date:   7/18/2023       Orders Placed This Encounter   Medications    predniSONE (DELTASONE) 10 MG tablet     Sig: Take 6 tabs for 3 days, then 5 tabs for 3 days, then 4 tab for 3 days, then 3 tabs until f/u appt     Dispense:  100 tablet     Refill:  0          Medication List Accurate as of July 18, 2022  7:07 PM. If you have any questions, ask your nurse or doctor. START taking these medications      predniSONE 10 MG tablet  Commonly known as: DELTASONE  Take 6 tabs for 3 days, then 5 tabs for 3 days, then 4 tab for 3 days, then 3 tabs until f/u appt  Started by: Bruce Lam MD            CONTINUE taking these medications      hydroCHLOROthiazide 25 MG tablet  Commonly known as: HYDRODIURIL  Take 1 tablet by mouth every morning     lisinopril 40 MG tablet  Commonly known as: PRINIVIL;ZESTRIL  TAKE 1 TABLET BY MOUTH TWICE DAILY     QUEtiapine 25 MG tablet  Commonly known as: SEROquel  Take one tablet nightly for 2 nights then increase to 2 tabs nightly     spironolactone 25 MG tablet  Commonly known as: ALDACTONE  Take 1 tablet by mouth 2 times daily     triamcinolone 0.1 % cream  Commonly known as: KENALOG  Apply topically 2 times daily. STOP taking these medications      buPROPion 150 MG extended release tablet  Commonly known as: WELLBUTRIN SR  Stopped by: Bruce Lam MD               Where to Get Your Medications        These medications were sent to [address] [place] [id number]      Phone: [phone]   predniSONE 10 MG tablet           Plan:   Return for for review of outcome of today's recommendation. Patient Instructions   Trial of steroid taper for inflammatory disease. Labs to eval for inflammatory disorders as well as thyroid abnormality    Genesight test done to evaluate for best medication options to handle depressive symptoms    This note was partially created with the assistance of dictation. This may lead to grammatical or spelling errors. Brandt Means M.D.

## 2022-07-18 NOTE — PROGRESS NOTES
Diagnosis Orders   1. Primary hypertension Basic Metabolic Panel     Lipid, Fasting     CBC   2. Breast cancer screening by mammogram ARNOL DIGITAL SCREEN W OR WO CAD BILATERAL   3. Hirsutism     4. Anxiety QUEtiapine (SEROQUEL) 25 MG tablet   5. Itching triamcinolone (KENALOG) 0.1 % cream     QUEtiapine (SEROQUEL) 25 MG tablet   6. Neurodermatitis QUEtiapine (SEROQUEL) 25 MG tablet     ivermectin 3 MG tablet   7. Obesity (BMI 30-39. 9)         Return in 10 days (on 3/19/2022) for female exam due. Patient Instructions   Pt will take ivermectin one time dose for potential demodex folliculitis. traimcinolone for facial lesion     Pt will initiate exercise ball pelvic tilts to improve back pain. Avoid crunches. Initiate seroquel 25 mg nightly the night after taking ivermectin for 2 nights then increase seroquel to 50 mg nightly. Do not take benadryl or atarax with this at night     Atarax only duing the day now.      Patient Education

## 2022-07-19 ENCOUNTER — TELEPHONE (OUTPATIENT)
Dept: FAMILY MEDICINE CLINIC | Age: 62
End: 2022-07-19

## 2022-07-19 DIAGNOSIS — R10.2 PAIN OF PELVIC GIRDLE: ICD-10-CM

## 2022-07-19 DIAGNOSIS — M25.60 STIFFNESS IN JOINT: ICD-10-CM

## 2022-07-19 LAB
SEDIMENTATION RATE, ERYTHROCYTE: 30 MM (ref 0–30)
TSH REFLEX: 0.41 UIU/ML (ref 0.44–3.86)

## 2022-07-20 DIAGNOSIS — E05.90 HYPERTHYROIDISM: Primary | ICD-10-CM

## 2022-07-20 LAB — RHEUMATOID FACTOR: 11.2 IU/ML

## 2022-07-21 LAB — ANA IGG, ELISA: NORMAL

## 2022-07-26 ENCOUNTER — TELEPHONE (OUTPATIENT)
Dept: FAMILY MEDICINE CLINIC | Age: 62
End: 2022-07-26

## 2022-07-26 DIAGNOSIS — R10.2 PAIN OF PELVIC GIRDLE: Primary | ICD-10-CM

## 2022-07-26 RX ORDER — HYDROCODONE BITARTRATE AND ACETAMINOPHEN 5; 325 MG/1; MG/1
1 TABLET ORAL EVERY 6 HOURS PRN
Qty: 21 TABLET | Refills: 0 | Status: SHIPPED | OUTPATIENT
Start: 2022-07-26 | End: 2022-08-05 | Stop reason: SDUPTHER

## 2022-07-26 NOTE — TELEPHONE ENCOUNTER
Pt has been taking the steroid tablet and the steroid is not helping her. Pt is asking for something else to be called in, in addition to the steroid for pain, pt states she is in a lot of pain. At times she is not able to get out of her bed to use the rest room due to pain. Please advise. Pt  phone number is [phone]  Pt uses [facility].

## 2022-07-27 ENCOUNTER — OFFICE VISIT (OUTPATIENT)
Dept: ENDOCRINOLOGY | Age: 62
End: 2022-07-27
Payer: COMMERCIAL

## 2022-07-27 VITALS
HEART RATE: 99 BPM | HEIGHT: 61 IN | DIASTOLIC BLOOD PRESSURE: 98 MMHG | BODY MASS INDEX: 33.79 KG/M2 | OXYGEN SATURATION: 97 % | SYSTOLIC BLOOD PRESSURE: 162 MMHG | WEIGHT: 179 LBS

## 2022-07-27 DIAGNOSIS — G89.29 CHRONIC NECK PAIN: ICD-10-CM

## 2022-07-27 DIAGNOSIS — R79.89 DECREASED THYROID STIMULATING HORMONE (TSH) LEVEL: Primary | ICD-10-CM

## 2022-07-27 DIAGNOSIS — R79.89 DECREASED THYROID STIMULATING HORMONE (TSH) LEVEL: ICD-10-CM

## 2022-07-27 DIAGNOSIS — M54.2 CHRONIC NECK PAIN: ICD-10-CM

## 2022-07-27 LAB
T4 FREE: 1.22 NG/DL (ref 0.84–1.68)
TSH SERPL DL<=0.05 MIU/L-ACNC: 0.45 UIU/ML (ref 0.44–3.86)

## 2022-07-27 PROCEDURE — 99203 OFFICE O/P NEW LOW 30 MIN: CPT | Performed by: PHYSICIAN ASSISTANT

## 2022-07-27 ASSESSMENT — ENCOUNTER SYMPTOMS
SINUS PRESSURE: 0
EYE PAIN: 0
DIARRHEA: 0
SHORTNESS OF BREATH: 0
NAUSEA: 0
WHEEZING: 0
SORE THROAT: 0
COUGH: 0
VOMITING: 0
BACK PAIN: 1
RHINORRHEA: 0
ABDOMINAL PAIN: 0
EYE REDNESS: 0

## 2022-07-27 NOTE — Clinical Note
Ermias Jay Fines you for the referral of this very nice lady. I am  going to repeat and get some additional thyroid labs I will make further recommendations based on those results.   Feel free to write or call if you have any questions regarding her care Andrea Garcia

## 2022-07-27 NOTE — PROGRESS NOTES
7/27/2022    Assessment:       Diagnosis Orders   1. Decreased thyroid stimulating hormone (TSH) level  TSH    T3, Free    T4, Free    Thyroid Peroxidase Antibody    Thyroid Stimulating Immunoglobulin      2. Chronic neck pain  ELENA Carlson MD, Neurology, [place]          PLAN:     Repeat labs today   Will make further recommendations based on those results  Referral to neurology    Orders Placed This Encounter   Procedures    TSH     Standing Status:   Future     Standing Expiration Date:   7/27/2023    T3, Free     Standing Status:   Future     Standing Expiration Date:   7/27/2023    T4, Free     Standing Status:   Future     Standing Expiration Date:   7/27/2023    Thyroid Peroxidase Antibody     Standing Status:   Future     Standing Expiration Date:   7/27/2023    Thyroid Stimulating Immunoglobulin     Standing Status:   Future     Standing Expiration Date:   7/27/2023    ELENA Carlosn MD, Neurology, [place]     Referral Priority:   Routine     Referral Type:   Eval and Treat     Referral Reason:   Specialty Services Required     Referred to Provider:   Rufina Ford MD     Requested Specialty:   Neurology     Number of Visits Requested:   1       No orders of the defined types were placed in this encounter. Return in about 3 months (around 10/27/2022) for Thyroid. Subjective:     Chief Complaint   Patient presents with    New Patient    Thyroid Problem     Vitals:    07/27/22 1525   BP: (!) 162/98   Site: Left Upper Arm   Pulse: 99   SpO2: 97%   Weight: 179 lb (81.2 kg)   Height: 5' 1\" (1.549 m)     Wt Readings from Last 3 Encounters:   07/27/22 179 lb (81.2 kg)   07/18/22 180 lb (81.6 kg)   03/09/22 191 lb (86.6 kg)     BP Readings from Last 3 Encounters:   07/27/22 (!) 162/98   07/18/22 124/78   03/09/22 124/72     Moises Singh is a 42-year-old female presents today for evaluation of suppressed TSH.   She has no previous history of thyroid disease, denies family history of thyroid Surgical History:   Procedure Laterality Date    BACK SURGERY  02/27/2012    Dr. Ronaldo Bell / L 4 disc OR    BUNIONECTOMY Right 12/15/2016    [address]    John Carol  02/05/2018    Dr. Jennifer Ignacio [facility] W/ARTHRD N/A 9/20/2018    L5-S1 PLIF (POSTERIOR LUMBAR INTERBODY FUSION) performed by Narendra North MD at [address] 4 disc OR    TONSILLECTOMY       Social History     Socioeconomic History    Marital status:      Spouse name: Not on file    Number of children: 2    Years of education: Not on file    Highest education level: Not on file   Occupational History    Occupation: unemployed   Tobacco Use    Smoking status: Never    Smokeless tobacco: Never    Tobacco comments:     pt states that she smoked maybe 5 cigarettes in  her teens.    Substance and Sexual Activity    Alcohol use: Yes     Comment: social    Drug use: No    Sexual activity: Yes     Partners: Male   Other Topics Concern    Not on file   Social History Narrative    Not on file     Social Determinants of Health     Financial Resource Strain: Low Risk     Difficulty of Paying Living Expenses: Not hard at all   Food Insecurity: No Food Insecurity    Worried About Running Out of Food in the Last Year: Never true    Ran Out of Food in the Last Year: Never true   Transportation Needs: Not on file   Physical Activity: Not on file   Stress: Not on file   Social Connections: Not on file   Intimate Partner Violence: Not on file   Housing Stability: Not on file     Family History   Problem Relation Age of Onset    High Blood Pressure Mother     Arthritis Mother     High Blood Pressure Father     Arthritis Father     High Blood Pressure Sister     Depression Sister     High Blood Pressure Brother     Arthritis Daughter         fibromyalgia     Allergies   Allergen Reactions    Amlodipine Swelling    Lyrica [Pregabalin] Other (See Comments)     PASSING OUT  Passed out    Toprol Xl [Metoprolol] Other (See Comments) and Swelling     swelling  swelling       Current Outpatient Medications:     HYDROcodone-acetaminophen (NORCO) 5-325 MG per tablet, Take 1 tablet by mouth every 6 hours as needed for Pain for up to 7 days. Intended supply: 7 days. Take lowest dose possible to manage pain, Disp: 21 tablet, Rfl: 0    predniSONE (DELTASONE) 10 MG tablet, Take 6 tabs for 3 days, then 5 tabs for 3 days, then 4 tab for 3 days, then 3 tabs until f/u appt, Disp: 100 tablet, Rfl: 0    triamcinolone (KENALOG) 0.1 % cream, Apply topically 2 times daily. , Disp: 454 g, Rfl: 0    QUEtiapine (SEROQUEL) 25 MG tablet, Take one tablet nightly for 2 nights then increase to 2 tabs nightly, Disp: 60 tablet, Rfl: 3    hydroCHLOROthiazide (HYDRODIURIL) 25 MG tablet, Take 1 tablet by mouth every morning, Disp: 90 tablet, Rfl: 3    lisinopril (PRINIVIL;ZESTRIL) 40 MG tablet, TAKE 1 TABLET BY MOUTH TWICE DAILY, Disp: 180 tablet, Rfl: 3    spironolactone (ALDACTONE) 25 MG tablet, Take 1 tablet by mouth 2 times daily, Disp: 60 tablet, Rfl: 2  Lab Results   Component Value Date     07/13/2022    K 4.3 07/13/2022    CL 98 07/13/2022    CO2 26 07/13/2022    BUN 25 (H) 07/13/2022    CREATININE 1.41 (H) 07/13/2022    GLUCOSE 87 07/13/2022    CALCIUM 9.3 07/13/2022    PROT 7.4 09/30/2020    LABALBU 4.0 09/30/2020    BILITOT <0.2 09/30/2020    ALKPHOS 98 09/30/2020    AST 60 (H) 09/30/2020    ALT 72 (H) 09/30/2020    LABGLOM 38.0 (L) 07/13/2022    GFRAA 46.0 (L) 07/13/2022    GLOB 3.4 09/30/2020     Lab Results   Component Value Date    WBC 6.8 03/10/2022    HGB 12.1 03/10/2022    HCT 36.4 (L) 03/10/2022    MCV 93.4 03/10/2022     03/10/2022     Lab Results   Component Value Date    LABA1C 5.4 08/24/2018    LABA1C 5.3 04/10/2018    LABA1C 5.0 11/07/2016     Lab Results   Component Value Date    CHOLFAST 200 (H) 03/10/2022    CHOLFAST 209 (H) 08/18/2020    TRIGLYCFAST 103 03/10/2022    TRIGLYCFAST 84 08/18/2020    HDL 59 03/10/2022     (H) 08/18/2020    HDL 76 (H) 04/10/2018    LDLCALC 120 03/10/2022    LDLCALC 91 08/18/2020    LDLCALC 94 04/10/2018    CHOL 194 04/10/2018    CHOL 207 (H) 09/21/2016    CHOL 208 (H) 03/17/2015    TRIG 122 04/10/2018    TRIG 74 09/21/2016    TRIG 141 03/17/2015     No results found for: TESTOSTERONE, SHBG, TESTFREENM  Lab Results   Component Value Date    TSH 1.530 04/10/2018    TSH 1.720 11/07/2016    TSH 1.030 09/21/2016    TSHREFLEX 0.415 (L) 07/19/2022    TSHREFLEX 1.200 08/18/2020     No results found for: TPOABS    Review of Systems   Constitutional:  Negative for chills, fatigue and fever. HENT:  Negative for congestion, ear pain, postnasal drip, rhinorrhea, sinus pressure and sore throat. Eyes:  Negative for pain and redness. Respiratory:  Negative for cough, shortness of breath and wheezing. Cardiovascular:  Negative for chest pain, palpitations and leg swelling. Gastrointestinal:  Negative for abdominal pain, diarrhea, nausea and vomiting. Endocrine: Negative for cold intolerance and heat intolerance. Genitourinary:  Negative for difficulty urinating. Musculoskeletal:  Positive for back pain, myalgias, neck pain and neck stiffness. Negative for arthralgias. Skin:  Negative for rash. Neurological:  Negative for dizziness and headaches. Psychiatric/Behavioral:  Positive for agitation. The patient is nervous/anxious. Objective:   Physical Exam  Constitutional:       Appearance: Normal appearance. She is well-developed. She is not ill-appearing. HENT:      Head: Normocephalic and atraumatic. Nose: No congestion. Eyes:      Conjunctiva/sclera: Conjunctivae normal.   Neck:      Comments: No thyromegaly or palpable nodules  Cardiovascular:      Rate and Rhythm: Normal rate and regular rhythm. Heart sounds: Normal heart sounds.    Pulmonary:      Effort: Pulmonary effort is normal. No respiratory distress. Breath sounds: Normal breath sounds. Abdominal:      General: Bowel sounds are normal.      Palpations: Abdomen is soft. Musculoskeletal:         General: Normal range of motion. Cervical back: Normal range of motion and neck supple. Skin:     General: Skin is warm and dry. Neurological:      General: No focal deficit present. Mental Status: She is alert and oriented to person, place, and time.       Gait: Gait abnormal.   Psychiatric:         Mood and Affect: Mood normal.

## 2022-07-28 ENCOUNTER — TELEPHONE (OUTPATIENT)
Dept: FAMILY MEDICINE CLINIC | Age: 62
End: 2022-07-28

## 2022-07-28 LAB
T3 FREE: 2.12 PG/ML (ref 2.02–4.43)
THYROID PEROXIDASE (TPO) ABS: <4 IU/ML (ref 0–25)

## 2022-07-28 NOTE — TELEPHONE ENCOUNTER
Pt called and said the pain meds prescribed yesterday are not working at all (Hydrocodone). She is still in pain.      Phone  [phone]

## 2022-07-30 LAB — THYROID STIMULATING IMMUNOGLOBULIN: <0.1 IU/L

## 2022-08-03 ENCOUNTER — TELEPHONE (OUTPATIENT)
Dept: ENDOCRINOLOGY | Age: 62
End: 2022-08-03

## 2022-08-04 ENCOUNTER — OFFICE VISIT (OUTPATIENT)
Dept: FAMILY MEDICINE CLINIC | Age: 62
End: 2022-08-04
Payer: COMMERCIAL

## 2022-08-04 VITALS
HEIGHT: 61 IN | BODY MASS INDEX: 33.61 KG/M2 | HEART RATE: 95 BPM | TEMPERATURE: 98.7 F | DIASTOLIC BLOOD PRESSURE: 72 MMHG | SYSTOLIC BLOOD PRESSURE: 112 MMHG | OXYGEN SATURATION: 96 % | WEIGHT: 178 LBS

## 2022-08-04 DIAGNOSIS — G51.32 CLONIC HEMIFACIAL SPASM OF MUSCLE OF LEFT SIDE OF FACE: ICD-10-CM

## 2022-08-04 DIAGNOSIS — G51.32 CLONIC HEMIFACIAL SPASM OF MUSCLE OF LEFT SIDE OF FACE: Primary | ICD-10-CM

## 2022-08-04 DIAGNOSIS — F32.A DEPRESSION, UNSPECIFIED DEPRESSION TYPE: ICD-10-CM

## 2022-08-04 DIAGNOSIS — Z15.89 HETEROZYGOUS METHYLENETETRAHYDROFOLATE REDUCTASE MUTATION C677T: ICD-10-CM

## 2022-08-04 PROCEDURE — 99215 OFFICE O/P EST HI 40 MIN: CPT | Performed by: FAMILY MEDICINE

## 2022-08-04 RX ORDER — LAMOTRIGINE 25 MG/1
TABLET ORAL
Qty: 60 TABLET | Refills: 0 | Status: SHIPPED | OUTPATIENT
Start: 2022-08-24 | End: 2022-09-12

## 2022-08-04 RX ORDER — LEVOMEFOLATE CALCIUM 15 MG
15 TABLET ORAL DAILY
Qty: 30 TABLET | Refills: 11 | Status: SHIPPED | OUTPATIENT
Start: 2022-08-04

## 2022-08-04 NOTE — PROGRESS NOTES
Diagnosis Orders   1. 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 Depression, unspecified depression type  lamoTRIgine (LAMICTAL) 25 MG tablet        Return in about 3 weeks (around 8/25/2022) for for review of outcome of today's recommendation. Patient Instructions   Start the L methylfolate daily to help the other medications work appropriately. Increase seroquel to 50 mg at nighttime    Titrate on 25 mg lamictal every  morning up every 10 days by 25 mg. By next appt 75 mg every morning    Testing ordered for current facial spasm laboratories and MRI    Subjective:      Patient ID: Nahed Plata is a 63 y.o. female who presents for:  Chief Complaint   Patient presents with    Results     Pt is here for genetic testing results. Currently in ongoing pain. Pain so bad that she cant settle down to sleep. She states pain feels like it is everywhere. Daughter states pt- is very body conscious, Talks fast and in circles, hard time focusing    The daughter takes lamictal, seroquel, and vistaril     Further discussion about pain patient states she has a lot of pain into the left elbow. And she is noticing spasming in her left jaw that goes from her ear to her chin. Sometimes it is very intense and she has a hard time calming it down. She recently was undergoing evaluation for thyroid abnormality that seems to be stabilizing. He also has a history of an MRI in 2018 that showed some arthritic changes. Reviewed with patient all the normal lab levels we have received back in terms of inflammatory disease evaluation.   Daughter here for that as well      Current Outpatient Medications on File Prior to Visit   Medication Sig Dispense Refill    POTASSIUM PO Take by mouth      MAGNESIUM PO Take by mouth      Multiple Vitamins-Minerals (HAIR SKIN AND NAILS FORMULA) TABS Take by mouth      predniSONE (DELTASONE) 10 MG tablet Take 6 tabs for 3 days, then 5 tabs for 3 days, then 4 tab for 3 days, then 3 tabs until f/u appt 100 tablet 0    triamcinolone (KENALOG) 0.1 % cream Apply topically 2 times daily. 454 g 0    QUEtiapine (SEROQUEL) 25 MG tablet Take one tablet nightly for 2 nights then increase to 2 tabs nightly 60 tablet 3    hydroCHLOROthiazide (HYDRODIURIL) 25 MG tablet Take 1 tablet by mouth every morning 90 tablet 3    lisinopril (PRINIVIL;ZESTRIL) 40 MG tablet TAKE 1 TABLET BY MOUTH TWICE DAILY 180 tablet 3    spironolactone (ALDACTONE) 25 MG tablet Take 1 tablet by mouth 2 times daily 60 tablet 2     No current facility-administered medications on file prior to visit.      Past Medical History:   Diagnosis Date    Alcoholic fatty liver 87/42/1971    By  Liver U/S 12/09/14    Arthritis     Borderline hyperlipidemia 11/13/2014    has never been on meds    Bunion, right foot     Chronic back pain     Chronic neck pain     Degenerative disc disease, lumbar 11/13/2014    L5-S1    Dermatitis     Elevated liver enzymes 11/13/2014    History of alcohol abuse 12/3/2014    Hypertension     meds > 5 yrs    Hypochloremia 11/13/2014    Hypokalemia     Hyponatremia     Lateral epicondylitis 3/14/2016     Updating Deprecated Diagnoses    Lumbar herniated disc     Obesity (BMI 30-39.9) 11/6/2014    Pedal edema     Restless leg syndrome     RLS (restless legs syndrome)     Thrombocytosis     Tinea pedis 12/16/2014    Toenail fungus     UTI (lower urinary tract infection) 11/19/2014     Past Surgical History:   Procedure Laterality Date    BACK SURGERY  02/27/2012    Dr. Ian Rudolph / [facility] 4 disc OR    BUNIONECTOMY Right 12/15/2016    [facility]  [address]  02/05/2018    Dr. Jada Patino W/OMAR N/A 9/20/2018    L5-S1 PLIF (POSTERIOR LUMBAR INTERBODY FUSION) performed by Marta Hanley MD at [address] 4 disc OR    TONSILLECTOMY       Social History     Socioeconomic History    Marital status:      Spouse name: Not on file    Number of children: 2    Years of education: Not on file    Highest education level: Not on file   Occupational History    Occupation: unemployed   Tobacco Use    Smoking status: Never     Passive exposure: Past    Smokeless tobacco: Never    Tobacco comments:     pt states that she smoked maybe 5 cigarettes in her teens. Vaping Use    Vaping Use: Former    Passive vaping exposure: Yes   Substance and Sexual Activity    Alcohol use: Not Currently     Comment: social    Drug use: Yes     Types: Marijuana Filiberto Perez     Comment: medical    Sexual activity: Yes     Partners: Male   Other Topics Concern    Not on file   Social History Narrative    Not on file     Social Determinants of Health     Financial Resource Strain: Low Risk     Difficulty of Paying Living Expenses: Not hard at all   Food Insecurity: No Food Insecurity    Worried About Running Out of Food in the Last Year: Never true    Ran Out of Food in the Last Year: Never true   Transportation Needs: Not on file   Physical Activity: Not on file   Stress: Not on file   Social Connections: Not on file   Intimate Partner Violence: Not on file   Housing Stability: Not on file     Family History   Problem Relation Age of Onset    High Blood Pressure Mother     Arthritis Mother     High Blood Pressure Father     Arthritis Father     High Blood Pressure Sister     Depression Sister     High Blood Pressure Brother     Arthritis Daughter         fibromyalgia     Allergies:  Amlodipine, Lyrica [pregabalin], and Toprol xl [metoprolol]    Review of Systems   Constitutional:  Negative for chills, diaphoresis and fatigue. Musculoskeletal:  Negative for arthralgias and joint swelling. Neurological:  Positive for facial asymmetry and headaches.  Negative for dizziness, tremors, speech difficulty, weakness and numbness. Objective:   /72 (Site: Left Upper Arm, Position: Sitting, Cuff Size: Large Adult)   Pulse 95   Temp 98.7 °F (37.1 °C) (Temporal)   Ht 5' 1\" (1.549 m)   Wt 178 lb (80.7 kg)   LMP 09/19/2012   SpO2 96%   BMI 33.63 kg/m²     Physical Exam  Constitutional:       General: She is not in acute distress. Appearance: She is well-developed. She is not diaphoretic. HENT:      Head: Normocephalic and atraumatic. Right Ear: External ear normal.      Left Ear: External ear normal.      Nose: Nose normal.   Eyes:      General:         Right eye: No discharge. Left eye: No discharge. Conjunctiva/sclera: Conjunctivae normal.      Pupils: Pupils are equal, round, and reactive to light. Neck:      Thyroid: No thyromegaly. Trachea: No tracheal deviation. Cardiovascular:      Rate and Rhythm: Normal rate and regular rhythm. Pulmonary:      Effort: Pulmonary effort is normal. No respiratory distress. Abdominal:      General: There is no distension. Musculoskeletal:      Cervical back: Neck supple. Skin:     General: Skin is warm and dry. Findings: No bruising or rash. Neurological:      Mental Status: She is alert. Coordination: Coordination normal.   Psychiatric:         Thought Content: Thought content normal.         Judgment: Judgment normal.       No results found for this visit on 08/04/22.     Recent Results (from the past 2016 hour(s))   Basic Metabolic Panel    Collection Time: 07/13/22  3:40 PM   Result Value Ref Range    Sodium 137 135 - 144 mEq/L    Potassium 4.3 3.4 - 4.9 mEq/L    Chloride 98 95 - 107 mEq/L    CO2 26 20 - 31 mEq/L    Anion Gap 13 9 - 15 mEq/L    Glucose 87 70 - 99 mg/dL    BUN 25 (H) 6 - 20 mg/dL    Creatinine 1.41 (H) 0.50 - 0.90 mg/dL    GFR Non-African American 38.0 (L) >60    GFR  46.0 (L) >60    Calcium 9.3 8.5 - 9.9 mg/dL   TSH with Reflex    Collection Time: 07/19/22  9:04 AM   Result Value Ref Range    TSH 0.415 (L) 0.440 - 3.860 uIU/mL   Rheumatoid Factor    Collection Time: 07/19/22  9:04 AM   Result Value Ref Range    Rheumatoid Factor 11.2 <14 IU/mL   Sedimentation Rate    Collection Time: 07/19/22  9:04 AM   Result Value Ref Range    Sed Rate 30 0 - 30 mm   OKSANA Screen With Reflex    Collection Time: 07/19/22  9:04 AM   Result Value Ref Range    OKSANA Ab, IgG DAVION None Detected None Detected   TSH    Collection Time: 07/27/22  4:22 PM   Result Value Ref Range    TSH 0.448 0.440 - 3.860 uIU/mL   T3, Free    Collection Time: 07/27/22  4:22 PM   Result Value Ref Range    T3, Free 2.12 2.02 - 4.43 pg/mL   T4, Free    Collection Time: 07/27/22  4:22 PM   Result Value Ref Range    T4 Free 1.22 0.84 - 1.68 ng/dL   Thyroid Peroxidase Antibody    Collection Time: 07/27/22  4:22 PM   Result Value Ref Range    Thyroid Peroxidase (TPO) Abs <4.0 0.0 - 25.0 IU/mL   Thyroid Stimulating Immunoglobulin    Collection Time: 07/27/22  4:22 PM   Result Value Ref Range    TSI <0.10 <=0.54 IU/L   MISC ARUP 4    Collection Time: 08/04/22  2:52 PM   Result Value Ref Range    Comment [id number] PTH        [x] Pt was seen by provider for  45    Minutes  Counseling and coordination of care was done for all assessment diagnosis listed for today with patient and any family/friend present. More than 50% of this visit was spent coordinating current care, obtaining information for prior records, and counseling for current plan of action. MRI from 2018 also reviewed         Assessment:       Diagnosis Orders   1. 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  Depression, unspecified depression type  lamoTRIgine (LAMICTAL) 25 MG tablet            Orders Placed This Encounter   Procedures    MRI CERVICAL SPINE W WO CONTRAST Standing Status:   Future     Standing Expiration Date:   8/4/2023     Order Specific Question:   STAT Creatinine as needed:     Answer:   Yes    Calcium, Ionized     Standing Status:   Future     Number of Occurrences:   1     Standing Expiration Date:   8/4/2023         Orders Placed This Encounter   Medications    L-Methylfolate 15 MG TABS     Sig: Take 15 mg by mouth daily     Dispense:  30 tablet     Refill:  11    lamoTRIgine (LAMICTAL) 25 MG tablet     Sig: Take 1 tablet by mouth daily for 10 days, THEN 2 tablets daily for 10 days, THEN 3 tablets daily for 10 days. Dispense:  60 tablet     Refill:  0            Medication List            Accurate as of August 4, 2022  4:21 PM. If you have any questions, ask your nurse or doctor. START taking these medications      L-Methylfolate 15 MG Tabs  Take 15 mg by mouth daily  Started by: Greg Miller MD     lamoTRIgine 25 MG tablet  Commonly known as: LaMICtal  Take 1 tablet by mouth daily for 10 days, THEN 2 tablets daily for 10 days, THEN 3 tablets daily for 10 days. Start taking on: August 24, 2022  Started by: Greg Miller MD            CONTINUE taking these medications      Hair Skin and Nails Formula Tabs     hydroCHLOROthiazide 25 MG tablet  Commonly known as: HYDRODIURIL  Take 1 tablet by mouth every morning     lisinopril 40 MG tablet  Commonly known as: PRINIVIL;ZESTRIL  TAKE 1 TABLET BY MOUTH TWICE DAILY     MAGNESIUM PO     POTASSIUM PO     predniSONE 10 MG tablet  Commonly known as: DELTASONE  Take 6 tabs for 3 days, then 5 tabs for 3 days, then 4 tab for 3 days, then 3 tabs until f/u appt     QUEtiapine 25 MG tablet  Commonly known as: SEROquel  Take one tablet nightly for 2 nights then increase to 2 tabs nightly     spironolactone 25 MG tablet  Commonly known as: ALDACTONE  Take 1 tablet by mouth 2 times daily     triamcinolone 0.1 % cream  Commonly known as: KENALOG  Apply topically 2 times daily.                Where to Get Your Medications        These medications were sent to [address] [facility] [address]  [address] [id number]      Phone: [phone]   L-Methylfolate 15 MG Tabs  lamoTRIgine 25 MG tablet           Plan:   Return in about 3 weeks (around 8/25/2022) for for review of outcome of today's recommendation. Patient Instructions   Start the L methylfolate daily to help the other medications work appropriately. Increase seroquel to 50 mg at nighttime    Titrate on 25 mg lamictal every  morning up every 10 days by 25 mg. By next appt 75 mg every morning    Testing ordered for current facial spasm laboratories and MRI    This note was partially created with the assistance of dictation. This may lead to grammatical or spelling errors. Brandt Noble M.D.

## 2022-08-04 NOTE — PATIENT INSTRUCTIONS
Start the L methylfolate daily to help the other medications work appropriately. Increase seroquel to 50 mg at nighttime    Titrate on 25 mg lamictal every  morning up every 10 days by 25 mg.   By next appt 75 mg every morning    Testing ordered for current facial spasm laboratories and MRI

## 2022-08-05 ENCOUNTER — TELEPHONE (OUTPATIENT)
Dept: FAMILY MEDICINE CLINIC | Age: 62
End: 2022-08-05

## 2022-08-05 DIAGNOSIS — R10.2 PAIN OF PELVIC GIRDLE: ICD-10-CM

## 2022-08-05 RX ORDER — HYDROCODONE BITARTRATE AND ACETAMINOPHEN 5; 325 MG/1; MG/1
1 TABLET ORAL EVERY 6 HOURS PRN
Qty: 21 TABLET | Refills: 0 | Status: SHIPPED | OUTPATIENT
Start: 2022-08-05 | End: 2022-08-08 | Stop reason: SDUPTHER

## 2022-08-05 NOTE — TELEPHONE ENCOUNTER
----- Message from Ada Calle sent at 8/5/2022  1:14 PM EDT -----  Subject: Referral Request    Reason for referral request? Patient is requesting a referral for a OBGYN   specialist to be seen for pap . Please contact patient when this is   approved and ready for scheduling. Provider patient wants to be referred to(if known):     Provider Phone Number(if known):     Additional Information for Provider?   ---------------------------------------------------------------------------  --------------  [address] [facility]    [phone]; OK to leave message on voicemail  ---------------------------------------------------------------------------  --------------

## 2022-08-05 NOTE — TELEPHONE ENCOUNTER
----- Message from Beverli Mess sent at 8/5/2022  1:17 PM EDT -----  Subject: Refill Request    QUESTIONS  Name of Medication? HYDROcodone-acetaminophen (NORCO) 5-325 MG per tablet  Patient-reported dosage and instructions? Take 1 tablet by mouth every 6   hours as needed for Pain for up to 7 days. Intended supply? 7 days. Take   lowest dose possible to manage pain  How many days do you have left? 0  Preferred Pharmacy? [id number]  Pharmacy phone number (if available)? [phone]  ---------------------------------------------------------------------------  --------------  Rosy Gravely INFO  What is the best way for the office to contact you? OK to leave message on   voicemail  Preferred Call Back Phone Number? [phone]  ---------------------------------------------------------------------------  --------------  SCRIPT ANSWERS  Relationship to Patient?  Self

## 2022-08-06 LAB
CALCIUM IONIZED, CALC AT PH 7.4: 1.29 MMOL/L (ref 1.11–1.3)
CALCIUM IONIZED: 1.24 MMOL/L (ref 1.11–1.3)

## 2022-08-08 ENCOUNTER — TELEPHONE (OUTPATIENT)
Dept: FAMILY MEDICINE CLINIC | Age: 62
End: 2022-08-08

## 2022-08-08 DIAGNOSIS — R10.2 PAIN OF PELVIC GIRDLE: ICD-10-CM

## 2022-08-08 LAB
COMMENT: NORMAL
MISCELLANEOUS LAB TEST RESULT: NORMAL

## 2022-08-08 RX ORDER — HYDROCODONE BITARTRATE AND ACETAMINOPHEN 5; 325 MG/1; MG/1
1 TABLET ORAL EVERY 6 HOURS PRN
Qty: 21 TABLET | Refills: 0 | Status: SHIPPED | OUTPATIENT
Start: 2022-08-08 | End: 2022-08-15

## 2022-08-08 NOTE — TELEPHONE ENCOUNTER
----- Message from Raf Mas sent at 8/8/2022  8:34 AM EDT -----  Subject: Medication Problem    Medication: HYDROcodone-acetaminophen (Chetna Garcia) 5-325 MG per tablet  Dosage: as needed  Ordering Provider: Kim Cotter    Question/Problem: patient went  RX, Pharmacy says they never   received the request. Please resend ASAP to: [facility] [address] -   [place], [address] [place] [address] [phone]     Pharmacy: [id number] - [facility] [address] [phone]    ---------------------------------------------------------------------------  --------------  [facility]  [phone]; OK to leave message on voicemail  ---------------------------------------------------------------------------  --------------    SCRIPT ANSWERS  Relationship to Patient: Self

## 2022-08-15 ENCOUNTER — TELEPHONE (OUTPATIENT)
Dept: FAMILY MEDICINE CLINIC | Age: 62
End: 2022-08-15

## 2022-08-15 NOTE — TELEPHONE ENCOUNTER
Patient also wanting to know if her follow up and pap can be combined into one appt,. Please advise.

## 2022-08-15 NOTE — TELEPHONE ENCOUNTER
Patient calling regarding MRI order. Patient states insurance did not approve it. [employer] ins. Can they be reached out too? Please call back with any updates.    Pt ph. [phone]

## 2022-08-15 NOTE — TELEPHONE ENCOUNTER
LMTCB      - Appts need to be separate. And we do not do MRI approval - the [facility] does that.

## 2022-08-31 ENCOUNTER — TELEPHONE (OUTPATIENT)
Dept: FAMILY MEDICINE CLINIC | Age: 62
End: 2022-08-31

## 2022-08-31 ENCOUNTER — OFFICE VISIT (OUTPATIENT)
Dept: FAMILY MEDICINE CLINIC | Age: 62
End: 2022-08-31
Payer: COMMERCIAL

## 2022-08-31 VITALS
BODY MASS INDEX: 33.42 KG/M2 | HEART RATE: 98 BPM | SYSTOLIC BLOOD PRESSURE: 150 MMHG | DIASTOLIC BLOOD PRESSURE: 74 MMHG | HEIGHT: 61 IN | OXYGEN SATURATION: 97 % | WEIGHT: 177 LBS | TEMPERATURE: 98.8 F

## 2022-08-31 DIAGNOSIS — F32.A DEPRESSION, UNSPECIFIED DEPRESSION TYPE: ICD-10-CM

## 2022-08-31 DIAGNOSIS — Z01.419 ENCOUNTER FOR GYNECOLOGICAL EXAMINATION WITHOUT ABNORMAL FINDING: Primary | ICD-10-CM

## 2022-08-31 DIAGNOSIS — Z12.31 BREAST CANCER SCREENING BY MAMMOGRAM: ICD-10-CM

## 2022-08-31 PROCEDURE — 99396 PREV VISIT EST AGE 40-64: CPT | Performed by: FAMILY MEDICINE

## 2022-08-31 RX ORDER — LAMOTRIGINE 25 MG/1
TABLET ORAL
Qty: 60 TABLET | Refills: 0 | Status: CANCELLED | OUTPATIENT
Start: 2022-08-31 | End: 2022-09-30

## 2022-08-31 NOTE — PROGRESS NOTES
History     Socioeconomic History    Marital status:      Spouse name: Not on file    Number of children: 2    Years of education: Not on file    Highest education level: Not on file   Occupational History    Occupation: unemployed   Tobacco Use    Smoking status: Never     Passive exposure: Past    Smokeless tobacco: Never    Tobacco comments:     pt states that she smoked maybe 5 cigarettes in her teens. Vaping Use    Vaping Use: Former    Passive vaping exposure: Yes   Substance and Sexual Activity    Alcohol use: Not Currently     Comment: social    Drug use: Yes     Types: Marijuana Rosario Postin)     Comment: medical    Sexual activity: Yes     Partners: Male   Other Topics Concern    Not on file   Social History Narrative    Not on file     Social Determinants of Health     Financial Resource Strain: Low Risk     Difficulty of Paying Living Expenses: Not hard at all   Food Insecurity: No Food Insecurity    Worried About Running Out of Food in the Last Year: Never true    Ran Out of Food in the Last Year: Never true   Transportation Needs: Not on file   Physical Activity: Not on file   Stress: Not on file   Social Connections: Not on file   Intimate Partner Violence: Not on file   Housing Stability: Not on file         Last mammogram 2017 normal  Breast cancer risk factors : average      Last Pap years    Patient's last menstrual period was 09/19/2012. Age at menopause onset: around 49 yo  Menopausal symptom assessment:  Vasomotor symptoms: none  Urinary incontinence &  symptoms: none  Sexual dysfunction: yes  Present Hormonalmedications: none    Osteoporosis risk assessment : average  Last bone mineral density : never    History of abnormal lipids: no  Hypertension yes  Stroke/Pal[place]    Yearly flu vaccine recommended.   Colonoscopy up-to-date    Review of Systems  Review of Systems    :     Vitals:  BP (!) 150/74   Pulse 98   Temp 98.8 °F (37.1 °C)   Ht 5' 1\" (1.549 m)   Wt 177 lb (80.3 kg)   LMP 2012   SpO2 97%   BMI 33.44 kg/m²     Physical Exam  Physical Exam      Assessment:      Diagnosis Orders   1. Encounter for gynecological examination without abnormal finding  PAP SMEAR      2. Breast cancer screening by mammogram  ARNOL DIGITAL SCREEN W OR WO CAD BILATERAL          Body mass index is 33.44 kg/m². Obesity:  Class III  Smoking:  No    Plan:   PAP SMEAR : indicated:  performed. Breast exam : Normal           Orders Placed This Encounter   Procedures    ARNOL DIGITAL SCREEN W OR WO CAD BILATERAL     Standing Status:   Future     Standing Expiration Date:   10/31/2023    PAP SMEAR     Patient History:    Patient's last menstrual period was 2012. OBGYN Status: Postmenopausal  Past Surgical History:  2012: BACK SURGERY      Comment:  Dr. Kaylie Charles / L 4 disc OR  12/15/2016: BUNIONECTOMY; Right      Comment:  [facility] [address]:  SECTION  2018: CHOLECYSTECTOMY, LAPAROSCOPIC      Comment:  Dr. Levar You  2018: CT INSJ BIOMCHN DEV INTERVERTEBRAL [address] [facility] SPC W/ARTHRD; N/A      Comment:  L5-S1 PLIF (POSTERIOR LUMBAR INTERBODY FUSION) performed               by Tai Johnson MD at [facility]  No date: SPINE SURGERY      Comment:  L 4 disc OR  No date: TONSILLECTOMY      Social History    Tobacco Use      Smoking status: Never      Smokeless tobacco: Never      Tobacco comments: pt states that she smoked maybe 5 cigarettes in her teens. Order Specific Question:   Collection Type     Answer: Thin Prep     Order Specific Question:   Prior Abnormal Pap Test     Answer:   No     Order Specific Question:   Screening or Diagnostic     Answer:   Screening     Order Specific Question:   Additional STD Testing     Answer:   N/A     Order Specific Question:   HPV Requested? Answer:   HPV if Abnormal     Order Specific Question:   High Risk Patient     Answer:   N/A       No orders of the defined types were placed in this encounter.          Medication List Accurate as of August 31, 2022  2:15 PM. If you have any questions, ask your nurse or doctor. CONTINUE taking these medications      Hair Skin and Nails Formula Tabs     hydroCHLOROthiazide 25 MG tablet  Commonly known as: HYDRODIURIL  Take 1 tablet by mouth every morning     L-Methylfolate 15 MG Tabs  Take 15 mg by mouth daily     lamoTRIgine 25 MG tablet  Commonly known as: LaMICtal  Take 1 tablet by mouth daily for 10 days, THEN 2 tablets daily for 10 days, THEN 3 tablets daily for 10 days. Start taking on: August 24, 2022     lisinopril 40 MG tablet  Commonly known as: PRINIVIL;ZESTRIL  TAKE 1 TABLET BY MOUTH TWICE DAILY     MAGNESIUM PO     POTASSIUM PO     predniSONE 10 MG tablet  Commonly known as: DELTASONE  Take 6 tabs for 3 days, then 5 tabs for 3 days, then 4 tab for 3 days, then 3 tabs until f/u appt     QUEtiapine 25 MG tablet  Commonly known as: SEROquel  Take one tablet nightly for 2 nights then increase to 2 tabs nightly     spironolactone 25 MG tablet  Commonly known as: ALDACTONE  Take 1 tablet by mouth 2 times daily     triamcinolone 0.1 % cream  Commonly known as: KENALOG  Apply topically 2 times daily. Follow up:  No follow-ups on file. There are no Patient Instructions on file for this visit. HEALTH MAINTENANCE:   Health information given. Women's Health patient information and counselling done. regarding risk/benefits/alternatives to Hormone Therapy and need for yearly re-evaluation. Periodic Pap smear discussed. Mammogram recommended yearly. Colon cancer screening by age 48 & every 5-10years. Bone mineral density (by age 72 or sooner if indication it would affect Hormone Therapy management or other risk factors for osteoporosis).       Silvina Douglas MD

## 2022-08-31 NOTE — TELEPHONE ENCOUNTER
Last refill  Next appt 9/6/22  Lov today    Pt is out of this medication. Pt is not sure as to how much and when she is to take this medication. Please advise pt. Pt had appt today and forgot to ask for a refill.

## 2022-09-01 RX ORDER — LAMOTRIGINE 25 MG/1
TABLET ORAL
Qty: 60 TABLET | Refills: 0 | OUTPATIENT
Start: 2022-09-01

## 2022-09-01 NOTE — TELEPHONE ENCOUNTER
Requesting medication refill.  Please approve or deny this request.    Rx requested:  Requested Prescriptions     Pending Prescriptions Disp Refills    lamoTRIgine (LAMICTAL) 25 MG tablet [Pharmacy Med Name: lamotrigine 25 mg tablet] 60 tablet 0     Sig: TAKE 1 TABLET BY MOUTH DAILY FOR 10 DAYS, then TAKE 2 TABLETS DAILY FOR 10 DAYS, then TAKE 3 TABLETS DAILY FOR 10 DAYS       Last Office Visit:   8/31/2022    Next Visit Date:  Future Appointments   Date Time Provider Edwin Lyn   9/6/2022  4:15 PM Yasmine Steen MD [facility] [place]

## 2022-09-01 NOTE — TELEPHONE ENCOUNTER
Last refill  Next appt 9/6/22  Lov today    Spoke to pt and explained the instructions for adding one tablet every 10 days until she is taking 3 tablets a day. She has an appointment on 9/6/22 to discuss whether she will stay at 3 tablets/day.

## 2022-09-02 NOTE — TELEPHONE ENCOUNTER
Pt of Dr. James Jon, Pt spouse calling stating that the pt has run out of this medication.  Please send medication to DM/V

## 2022-09-11 NOTE — TELEPHONE ENCOUNTER
Pt took 1 pill for 10 days, 2 pills for 10 days, 3 pills for 10 days. She was on the 3 pills for a few more days and wasn't sure if she should go higher. PT states she is not taking any right now because the Rx was not filled for her.

## 2022-09-12 DIAGNOSIS — F32.A DEPRESSION, UNSPECIFIED DEPRESSION TYPE: ICD-10-CM

## 2022-09-12 RX ORDER — LAMOTRIGINE 100 MG/1
100 TABLET ORAL DAILY
Qty: 30 TABLET | Refills: 2 | Status: SHIPPED | OUTPATIENT
Start: 2022-09-12 | End: 2022-12-11

## 2022-09-12 NOTE — TELEPHONE ENCOUNTER
Notify patient since she is up to 3 of the 25 mg lamotrigine which equals 75 mg her refill will be for 100 mg tablets once a day

## 2023-01-04 DIAGNOSIS — I10 PRIMARY HYPERTENSION: ICD-10-CM

## 2023-01-04 NOTE — TELEPHONE ENCOUNTER
Future Appointments    This patient does not currently have any appointments scheduled.   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

## 2023-01-05 RX ORDER — HYDROCHLOROTHIAZIDE 25 MG/1
25 TABLET ORAL EVERY MORNING
Qty: 90 TABLET | Refills: 0 | Status: SHIPPED | OUTPATIENT
Start: 2023-01-05

## 2023-01-06 DIAGNOSIS — I10 ESSENTIAL HYPERTENSION: ICD-10-CM

## 2023-01-06 NOTE — TELEPHONE ENCOUNTER
Future Appointments: Shaheen & schedule an appointment     This patient does not currently have any appointments scheduled.   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

## 2023-01-08 RX ORDER — LISINOPRIL 40 MG/1
TABLET ORAL
Qty: 180 TABLET | Refills: 0 | Status: SHIPPED | OUTPATIENT
Start: 2023-01-08

## 2023-02-17 ENCOUNTER — TELEPHONE (OUTPATIENT)
Dept: FAMILY MEDICINE CLINIC | Age: 63
End: 2023-02-17

## 2023-09-16 SDOH — HEALTH STABILITY: PHYSICAL HEALTH: ON AVERAGE, HOW MANY DAYS PER WEEK DO YOU ENGAGE IN MODERATE TO STRENUOUS EXERCISE (LIKE A BRISK WALK)?: 0 DAYS

## 2023-09-16 ASSESSMENT — SOCIAL DETERMINANTS OF HEALTH (SDOH)
WITHIN THE LAST YEAR, HAVE YOU BEEN KICKED, HIT, SLAPPED, OR OTHERWISE PHYSICALLY HURT BY YOUR PARTNER OR EX-PARTNER?: NO
WITHIN THE LAST YEAR, HAVE YOU BEEN AFRAID OF YOUR PARTNER OR EX-PARTNER?: NO
WITHIN THE LAST YEAR, HAVE YOU BEEN HUMILIATED OR EMOTIONALLY ABUSED IN OTHER WAYS BY YOUR PARTNER OR EX-PARTNER?: NO
WITHIN THE LAST YEAR, HAVE TO BEEN RAPED OR FORCED TO HAVE ANY KIND OF SEXUAL ACTIVITY BY YOUR PARTNER OR EX-PARTNER?: NO

## 2023-09-19 ENCOUNTER — HOSPITAL ENCOUNTER (OUTPATIENT)
Dept: ORTHOPEDIC SURGERY | Age: 63
Discharge: HOME | End: 2023-09-21
Payer: COMMERCIAL

## 2023-09-19 ENCOUNTER — OFFICE VISIT (OUTPATIENT)
Dept: ORTHOPEDIC SURGERY | Age: 63
End: 2023-09-19
Payer: COMMERCIAL

## 2023-09-19 VITALS
HEIGHT: 61 IN | TEMPERATURE: 98.1 F | WEIGHT: 177 LBS | OXYGEN SATURATION: 97 % | HEART RATE: 90 BPM | BODY MASS INDEX: 33.42 KG/M2

## 2023-09-19 DIAGNOSIS — M43.16 SPONDYLOLISTHESIS AT L4-L5 LEVEL: ICD-10-CM

## 2023-09-19 DIAGNOSIS — M54.50 LOW BACK PAIN, UNSPECIFIED BACK PAIN LATERALITY, UNSPECIFIED CHRONICITY, UNSPECIFIED WHETHER SCIATICA PRESENT: Primary | ICD-10-CM

## 2023-09-19 DIAGNOSIS — M54.50 LOW BACK PAIN, UNSPECIFIED BACK PAIN LATERALITY, UNSPECIFIED CHRONICITY, UNSPECIFIED WHETHER SCIATICA PRESENT: ICD-10-CM

## 2023-09-19 PROCEDURE — 99203 OFFICE O/P NEW LOW 30 MIN: CPT | Performed by: ORTHOPAEDIC SURGERY

## 2023-09-19 PROCEDURE — 72110 X-RAY EXAM L-2 SPINE 4/>VWS: CPT

## 2023-09-20 ENCOUNTER — TELEPHONE (OUTPATIENT)
Dept: FAMILY MEDICINE CLINIC | Age: 63
End: 2023-09-20

## 2023-09-27 ENCOUNTER — HOSPITAL ENCOUNTER (OUTPATIENT)
Dept: CT IMAGING | Age: 63
Discharge: HOME | End: 2023-09-29
Attending: ORTHOPAEDIC SURGERY
Payer: COMMERCIAL

## 2023-09-27 ENCOUNTER — HOSPITAL ENCOUNTER (OUTPATIENT)
Dept: MRI IMAGING | Age: 63
Discharge: HOME | End: 2023-09-29
Attending: ORTHOPAEDIC SURGERY
Payer: COMMERCIAL

## 2023-09-27 DIAGNOSIS — M54.50 LOW BACK PAIN, UNSPECIFIED BACK PAIN LATERALITY, UNSPECIFIED CHRONICITY, UNSPECIFIED WHETHER SCIATICA PRESENT: ICD-10-CM

## 2023-09-27 PROCEDURE — 72148 MRI LUMBAR SPINE W/O DYE: CPT

## 2023-09-27 PROCEDURE — 72131 CT LUMBAR SPINE W/O DYE: CPT

## 2023-10-02 ENCOUNTER — OFFICE VISIT (OUTPATIENT)
Dept: ORTHOPEDIC SURGERY | Age: 63
End: 2023-10-02
Payer: COMMERCIAL

## 2023-10-02 VITALS
HEIGHT: 61 IN | BODY MASS INDEX: 29.83 KG/M2 | HEART RATE: 91 BPM | WEIGHT: 158 LBS | TEMPERATURE: 97.1 F | OXYGEN SATURATION: 97 %

## 2023-10-02 DIAGNOSIS — M48.062 SPINAL STENOSIS OF LUMBAR REGION WITH NEUROGENIC CLAUDICATION: Primary | ICD-10-CM

## 2023-10-02 PROCEDURE — 99214 OFFICE O/P EST MOD 30 MIN: CPT | Performed by: ORTHOPAEDIC SURGERY

## 2023-10-02 RX ORDER — OXYCODONE HYDROCHLORIDE AND ACETAMINOPHEN 5; 325 MG/1; MG/1
TABLET ORAL
COMMUNITY
Start: 2023-09-26

## 2023-10-03 RX ORDER — CHLORHEXIDINE GLUCONATE 4 G/100ML
SOLUTION TOPICAL
Qty: 118 ML | Refills: 0 | Status: SHIPPED | OUTPATIENT
Start: 2023-10-03

## 2023-10-13 ENCOUNTER — TELEPHONE (OUTPATIENT)
Dept: ORTHOPEDIC SURGERY | Age: 63
End: 2023-10-13

## 2023-10-20 ENCOUNTER — TELEPHONE (OUTPATIENT)
Dept: ORTHOPEDIC SURGERY | Age: 63
End: 2023-10-20

## 2023-10-20 PROBLEM — M48.062 PSEUDOCLAUDICATION SYNDROME: Status: ACTIVE | Noted: 2023-10-20

## 2023-10-20 NOTE — TELEPHONE ENCOUNTER
I called the patient to give her the surgery dates. She is aware. She states that she was fitted for her back brace. I informed her that Stephanie Lu is pending and if we receive a denial I will be in contact with her.

## 2023-10-27 ENCOUNTER — OFFICE VISIT (OUTPATIENT)
Dept: ORTHOPEDIC SURGERY | Age: 63
End: 2023-10-27
Payer: COMMERCIAL

## 2023-10-27 VITALS
BODY MASS INDEX: 29.83 KG/M2 | DIASTOLIC BLOOD PRESSURE: 85 MMHG | HEIGHT: 61 IN | WEIGHT: 158 LBS | HEART RATE: 89 BPM | TEMPERATURE: 95.8 F | SYSTOLIC BLOOD PRESSURE: 139 MMHG | OXYGEN SATURATION: 95 %

## 2023-10-27 DIAGNOSIS — Z01.818 PRE-OP EXAM: Primary | ICD-10-CM

## 2023-10-27 PROCEDURE — 99024 POSTOP FOLLOW-UP VISIT: CPT | Performed by: PHYSICIAN ASSISTANT

## 2023-10-27 PROCEDURE — 93000 ELECTROCARDIOGRAM COMPLETE: CPT | Performed by: PHYSICIAN ASSISTANT

## 2023-10-27 RX ORDER — SODIUM CHLORIDE 9 MG/ML
INJECTION, SOLUTION INTRAVENOUS PRN
OUTPATIENT
Start: 2023-10-27

## 2023-10-27 RX ORDER — SODIUM CHLORIDE, SODIUM LACTATE, POTASSIUM CHLORIDE, CALCIUM CHLORIDE 600; 310; 30; 20 MG/100ML; MG/100ML; MG/100ML; MG/100ML
INJECTION, SOLUTION INTRAVENOUS CONTINUOUS
OUTPATIENT
Start: 2023-10-27

## 2023-10-27 RX ORDER — SODIUM CHLORIDE 0.9 % (FLUSH) 0.9 %
5-40 SYRINGE (ML) INJECTION PRN
OUTPATIENT
Start: 2023-10-27

## 2023-10-27 RX ORDER — SODIUM CHLORIDE 0.9 % (FLUSH) 0.9 %
5-40 SYRINGE (ML) INJECTION EVERY 12 HOURS SCHEDULED
OUTPATIENT
Start: 2023-10-27

## 2023-10-27 NOTE — PATIENT INSTRUCTIONS
-please ensure that blood work is done at least 3 days     -stop taking asprin and motrin until after your surgery. All blood thinners need to be stopped at least 5 days in advance prior to surgery. If you experiencing pain, the only medication you can take for that is tylenol 3-4x / day not to exceed 4000 mg.    -Hibiclens was sent to your pharmacy to . Please follow the instructions provided with the prescription and use daily starting 5 days before surgery.     -no eating / drinking the after midnight the night before surgery.  Sips of water the morning of for all other medications is OK    -our surgery department will reach out the you the day before your surgery and let you know what time to arrive at the [facility] ([address], same place as where you got blood work)    If you have any questions, please call our office and I will be happy to answer them

## 2023-10-27 NOTE — PROGRESS NOTES
[facility]    H&P: Preadmission Testing     Patient: Shirley Del Toro  YOB: 1960  MRN: [id number]    Subjective:     Chief Complaint   Patient presents with    Pre-op Exam     Pre-op- 11/02/2023- Dr. Edgardo Gardner- L4-5 laminectomy with bilateral foraminotomies        HPI: Shirley Del Toro is a 66 y.o. femaleis here for L4-L5 laminectomy and bilateral foraminotomies, extension of fusion at L4 with revision of the posterior lumbar interbody to be performed by Dr. Edgardo Gardner. This is a preadmission consultation requested by the surgeon themself. They have a pertinent history of hypertension    There is no known history of heart disease or infarction. There is no recent chest pain or discomfort, palpitations, shortness of breath, KUMAR, difficulties with exercise, bilateral ankle swelling. Not taking any blood thinners. There is no known history of obstructive or restrictive lung disease. No smoking. No recent wheezing or use of any kind of inhalers. No recent hospitalizations for any lung-related illnesses. No recent Covid infection. No known history of gastric issues which includes ulcers, herniations, bariatric surgeries. No recent GI bleeds    No known history of hyper or hypercoagulable states. They are not diabetic. They have normal kidney function. Past Medical History:        Diagnosis Date    Alcoholic fatty liver 48/03/8240    By  Liver U/S 12/09/14    Arthritis     Borderline hyperlipidemia 11/13/2014    has never been on meds    Bunion, right foot     Chronic back pain     Chronic neck pain     Degenerative disc disease, lumbar 11/13/2014    L5-S1    Dermatitis     Elevated liver enzymes 11/13/2014    History of alcohol abuse 12/3/2014    Hypertension     meds > 5 yrs    Hypochloremia 11/13/2014    Hypokalemia     Hyponatremia     Lateral epicondylitis 3/14/2016     Updating Deprecated Diagnoses    Lumbar herniated disc     Obesity (BMI 30-39. 9)

## 2023-10-30 ENCOUNTER — TELEPHONE (OUTPATIENT)
Dept: ORTHOPEDIC SURGERY | Age: 63
End: 2023-10-30

## 2023-10-30 NOTE — TELEPHONE ENCOUNTER
Patient's  returned VM. He states that her surgery is supposed to be in November and asked why it was scheduled out for another month. He states that she mack not see any pulmonologist or cardiologist and does not need any medical clearances. He also requested afternoon appointments for his pre-op and post-op appointments.

## 2023-10-30 NOTE — TELEPHONE ENCOUNTER
José Angelo from Dr. Marlowe Brunner office has attempted to contact patient in regards to scheduled surgery with Dr. Miguel Bonilla. Dr. Miguel Bonilla will have to move patients surgery. When a date is given patient will be contacted again with those new details.

## 2023-11-01 ENCOUNTER — HOSPITAL ENCOUNTER (EMERGENCY)
Age: 63
Discharge: HOME | End: 2023-11-02
Attending: STUDENT IN AN ORGANIZED HEALTH CARE EDUCATION/TRAINING PROGRAM
Payer: COMMERCIAL

## 2023-11-01 ENCOUNTER — OFFICE VISIT (OUTPATIENT)
Dept: FAMILY MEDICINE CLINIC | Age: 63
End: 2023-11-01

## 2023-11-01 ENCOUNTER — APPOINTMENT (OUTPATIENT)
Dept: CT IMAGING | Age: 63
End: 2023-11-01
Payer: COMMERCIAL

## 2023-11-01 VITALS
TEMPERATURE: 97.4 F | DIASTOLIC BLOOD PRESSURE: 78 MMHG | HEIGHT: 61 IN | SYSTOLIC BLOOD PRESSURE: 138 MMHG | BODY MASS INDEX: 29.84 KG/M2 | WEIGHT: 158.07 LBS | RESPIRATION RATE: 12 BRPM | HEART RATE: 113 BPM | OXYGEN SATURATION: 98 %

## 2023-11-01 DIAGNOSIS — M54.50 ACUTE BILATERAL LOW BACK PAIN, UNSPECIFIED WHETHER SCIATICA PRESENT: ICD-10-CM
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
## 2023-11-01 DIAGNOSIS — M54.50 ACUTE BILATERAL LOW BACK PAIN, UNSPECIFIED WHETHER SCIATICA PRESENT: Primary | ICD-10-CM

## 2023-11-01 DIAGNOSIS — E87.6 HYPOKALEMIA: Primary | ICD-10-CM

## 2023-11-01 DIAGNOSIS — E04.1 THYROID NODULE: ICD-10-CM

## 2023-11-01 DIAGNOSIS — R29.898 LEG WEAKNESS, BILATERAL: ICD-10-CM

## 2023-11-01 LAB
ALBUMIN SERPL-MCNC: 4.9 G/DL (ref 3.5–4.6)
ALP SERPL-CCNC: 110 U/L (ref 40–130)
ALT SERPL-CCNC: 77 U/L (ref 0–33)
ANION GAP SERPL CALCULATED.3IONS-SCNC: 16 MEQ/L (ref 9–15)
AST SERPL-CCNC: 40 U/L (ref 0–35)
BASOPHILS # BLD: 0 K/UL (ref 0–0.2)
BASOPHILS NFR BLD: 0.3 %
BILIRUB SERPL-MCNC: 0.5 MG/DL (ref 0.2–0.7)
BUN SERPL-MCNC: 12 MG/DL (ref 8–23)
CALCIUM SERPL-MCNC: 10.2 MG/DL (ref 8.5–9.9)
CHLORIDE SERPL-SCNC: 85 MEQ/L (ref 95–107)
CK SERPL-CCNC: 320 U/L (ref 0–170)
CO2 SERPL-SCNC: 27 MEQ/L (ref 20–31)
CREAT SERPL-MCNC: 0.82 MG/DL (ref 0.5–0.9)
EOSINOPHIL # BLD: 0 K/UL (ref 0–0.7)
EOSINOPHIL NFR BLD: 0.1 %
ERYTHROCYTE [DISTWIDTH] IN BLOOD BY AUTOMATED COUNT: 11.3 % (ref 11.5–14.5)
ETHANOL PERCENT: NORMAL G/DL
ETHANOLAMINE SERPL-MCNC: <10 MG/DL (ref 0–0.08)
GLOBULIN SER CALC-MCNC: 2.9 G/DL (ref 2.3–3.5)
GLUCOSE SERPL-MCNC: 118 MG/DL (ref 70–99)
HCT VFR BLD AUTO: 42.4 % (ref 37–47)
HGB BLD-MCNC: 15.8 G/DL (ref 12–16)
LACTATE BLDV-SCNC: 2 MMOL/L (ref 0.5–2.2)
LYMPHOCYTES # BLD: 1.5 K/UL (ref 1–4.8)
LYMPHOCYTES NFR BLD: 11.4 %
MAGNESIUM SERPL-MCNC: 2.5 MG/DL (ref 1.7–2.4)
MCH RBC QN AUTO: 32.5 PG (ref 27–31.3)
MCHC RBC AUTO-ENTMCNC: 37.3 % (ref 33–37)
MCV RBC AUTO: 87.2 FL (ref 79.4–94.8)
MONOCYTES # BLD: 1.3 K/UL (ref 0.2–0.8)
MONOCYTES NFR BLD: 10.3 %
NEUTROPHILS # BLD: 9.8 K/UL (ref 1.4–6.5)
NEUTS SEG NFR BLD: 77.6 %
PERFORMED ON: ABNORMAL
PLATELET # BLD AUTO: 426 K/UL (ref 130–400)
POC CREATININE WHOLE BLOOD: 1.1
POC CREATININE: 1.1 MG/DL (ref 0.6–1.2)
POC SAMPLE TYPE: ABNORMAL
POTASSIUM SERPL-SCNC: 2.8 MEQ/L (ref 3.4–4.9)
PROT SERPL-MCNC: 7.8 G/DL (ref 6.3–8)
RBC # BLD AUTO: 4.86 M/UL (ref 4.2–5.4)
SODIUM SERPL-SCNC: 128 MEQ/L (ref 135–144)
TROPONIN, HIGH SENSITIVITY: 20 NG/L (ref 0–19)
TROPONIN, HIGH SENSITIVITY: 20 NG/L (ref 0–19)
TROPONIN, HIGH SENSITIVITY: 21 NG/L (ref 0–19)
WBC # BLD AUTO: 12.7 K/UL (ref 4.8–10.8)

## 2023-11-01 PROCEDURE — 2580000003 HC RX 258: Performed by: EMERGENCY MEDICINE

## 2023-11-01 PROCEDURE — 83605 ASSAY OF LACTIC ACID: CPT

## 2023-11-01 PROCEDURE — 70450 CT HEAD/BRAIN W/O DYE: CPT

## 2023-11-01 PROCEDURE — 71260 CT THORAX DX C+: CPT

## 2023-11-01 PROCEDURE — 6370000000 HC RX 637 (ALT 250 FOR IP): Performed by: STUDENT IN AN ORGANIZED HEALTH CARE EDUCATION/TRAINING PROGRAM

## 2023-11-01 PROCEDURE — 72125 CT NECK SPINE W/O DYE: CPT

## 2023-11-01 PROCEDURE — 99999 PR OFFICE/OUTPT VISIT,PROCEDURE ONLY: CPT | Performed by: NURSE PRACTITIONER

## 2023-11-01 PROCEDURE — 6360000004 HC RX CONTRAST MEDICATION: Performed by: STUDENT IN AN ORGANIZED HEALTH CARE EDUCATION/TRAINING PROGRAM

## 2023-11-01 PROCEDURE — 96375 TX/PRO/DX INJ NEW DRUG ADDON: CPT

## 2023-11-01 PROCEDURE — 85025 COMPLETE CBC W/AUTO DIFF WBC: CPT

## 2023-11-01 PROCEDURE — 99285 EMERGENCY DEPT VISIT HI MDM: CPT

## 2023-11-01 PROCEDURE — 80053 COMPREHEN METABOLIC PANEL: CPT

## 2023-11-01 PROCEDURE — 6360000002 HC RX W HCPCS: Performed by: STUDENT IN AN ORGANIZED HEALTH CARE EDUCATION/TRAINING PROGRAM

## 2023-11-01 PROCEDURE — 96365 THER/PROPH/DIAG IV INF INIT: CPT

## 2023-11-01 PROCEDURE — 72131 CT LUMBAR SPINE W/O DYE: CPT

## 2023-11-01 PROCEDURE — 96376 TX/PRO/DX INJ SAME DRUG ADON: CPT

## 2023-11-01 PROCEDURE — 83735 ASSAY OF MAGNESIUM: CPT

## 2023-11-01 PROCEDURE — 84484 ASSAY OF TROPONIN QUANT: CPT

## 2023-11-01 PROCEDURE — 6360000002 HC RX W HCPCS: Performed by: EMERGENCY MEDICINE

## 2023-11-01 PROCEDURE — 93005 ELECTROCARDIOGRAM TRACING: CPT | Performed by: STUDENT IN AN ORGANIZED HEALTH CARE EDUCATION/TRAINING PROGRAM

## 2023-11-01 PROCEDURE — 82550 ASSAY OF CK (CPK): CPT

## 2023-11-01 PROCEDURE — 36415 COLL VENOUS BLD VENIPUNCTURE: CPT

## 2023-11-01 PROCEDURE — 2580000003 HC RX 258: Performed by: STUDENT IN AN ORGANIZED HEALTH CARE EDUCATION/TRAINING PROGRAM

## 2023-11-01 PROCEDURE — 72128 CT CHEST SPINE W/O DYE: CPT

## 2023-11-01 PROCEDURE — 6370000000 HC RX 637 (ALT 250 FOR IP): Performed by: EMERGENCY MEDICINE

## 2023-11-01 PROCEDURE — 74177 CT ABD & PELVIS W/CONTRAST: CPT

## 2023-11-01 PROCEDURE — 82077 ASSAY SPEC XCP UR&BREATH IA: CPT

## 2023-11-01 RX ORDER — MORPHINE SULFATE 4 MG/ML
4 INJECTION, SOLUTION INTRAMUSCULAR; INTRAVENOUS ONCE
Status: COMPLETED | OUTPATIENT
Start: 2023-11-01 | End: 2023-11-01

## 2023-11-01 RX ORDER — POTASSIUM CHLORIDE 20 MEQ/1
20 TABLET, EXTENDED RELEASE ORAL 2 TIMES DAILY
Qty: 14 TABLET | Refills: 0 | Status: SHIPPED | OUTPATIENT
Start: 2023-11-01 | End: 2023-11-08

## 2023-11-01 RX ORDER — 0.9 % SODIUM CHLORIDE 0.9 %
500 INTRAVENOUS SOLUTION INTRAVENOUS ONCE
Status: COMPLETED | OUTPATIENT
Start: 2023-11-01 | End: 2023-11-01

## 2023-11-01 RX ORDER — POTASSIUM CHLORIDE 20 MEQ/1
40 TABLET, EXTENDED RELEASE ORAL ONCE
Status: DISCONTINUED | OUTPATIENT
Start: 2023-11-01 | End: 2023-11-01

## 2023-11-01 RX ORDER — ONDANSETRON 2 MG/ML
4 INJECTION INTRAMUSCULAR; INTRAVENOUS ONCE
Status: COMPLETED | OUTPATIENT
Start: 2023-11-01 | End: 2023-11-01

## 2023-11-01 RX ORDER — 0.9 % SODIUM CHLORIDE 0.9 %
1000 INTRAVENOUS SOLUTION INTRAVENOUS ONCE
Status: COMPLETED | OUTPATIENT
Start: 2023-11-01 | End: 2023-11-01

## 2023-11-01 RX ORDER — POTASSIUM CHLORIDE 7.45 MG/ML
10 INJECTION INTRAVENOUS ONCE
Status: COMPLETED | OUTPATIENT
Start: 2023-11-01 | End: 2023-11-01

## 2023-11-01 RX ORDER — OXYCODONE HYDROCHLORIDE 5 MG/1
5 TABLET ORAL EVERY 6 HOURS PRN
Qty: 12 TABLET | Refills: 0 | Status: SHIPPED | OUTPATIENT
Start: 2023-11-01 | End: 2023-11-03

## 2023-11-01 RX ADMIN — SODIUM CHLORIDE 500 ML: 9 INJECTION, SOLUTION INTRAVENOUS at 20:22

## 2023-11-01 RX ADMIN — IOPAMIDOL 75 ML: 755 INJECTION, SOLUTION INTRAVENOUS at 19:52

## 2023-11-01 RX ADMIN — POTASSIUM BICARBONATE 50 MEQ: 978 TABLET, EFFERVESCENT ORAL at 20:02

## 2023-11-01 RX ADMIN — POTASSIUM CHLORIDE 10 MEQ: 7.46 INJECTION, SOLUTION INTRAVENOUS at 20:06

## 2023-11-01 RX ADMIN — SODIUM CHLORIDE 1000 ML: 9 INJECTION, SOLUTION INTRAVENOUS at 18:39

## 2023-11-01 RX ADMIN — MORPHINE SULFATE 4 MG: 4 INJECTION, SOLUTION INTRAMUSCULAR; INTRAVENOUS at 20:00

## 2023-11-01 RX ADMIN — MORPHINE SULFATE 4 MG: 4 INJECTION, SOLUTION INTRAMUSCULAR; INTRAVENOUS at 18:43

## 2023-11-01 RX ADMIN — ONDANSETRON 4 MG: 2 INJECTION INTRAMUSCULAR; INTRAVENOUS at 18:43

## 2023-11-01 SDOH — ECONOMIC STABILITY: FOOD INSECURITY: WITHIN THE PAST 12 MONTHS, YOU WORRIED THAT YOUR FOOD WOULD RUN OUT BEFORE YOU GOT MONEY TO BUY MORE.: NEVER TRUE

## 2023-11-01 SDOH — ECONOMIC STABILITY: INCOME INSECURITY: HOW HARD IS IT FOR YOU TO PAY FOR THE VERY BASICS LIKE FOOD, HOUSING, MEDICAL CARE, AND HEATING?: NOT HARD AT ALL

## 2023-11-01 SDOH — ECONOMIC STABILITY: HOUSING INSECURITY
IN THE LAST 12 MONTHS, WAS THERE A TIME WHEN YOU DID NOT HAVE A STEADY PLACE TO SLEEP OR SLEPT IN A SHELTER (INCLUDING NOW)?: NO

## 2023-11-01 SDOH — ECONOMIC STABILITY: FOOD INSECURITY: WITHIN THE PAST 12 MONTHS, THE FOOD YOU BOUGHT JUST DIDN'T LAST AND YOU DIDN'T HAVE MONEY TO GET MORE.: NEVER TRUE

## 2023-11-01 ASSESSMENT — PAIN SCALES - GENERAL
PAINLEVEL_OUTOF10: 5
PAINLEVEL_OUTOF10: 10

## 2023-11-01 ASSESSMENT — PATIENT HEALTH QUESTIONNAIRE - PHQ9
7. TROUBLE CONCENTRATING ON THINGS, SUCH AS READING THE NEWSPAPER OR WATCHING TELEVISION: 0
6. FEELING BAD ABOUT YOURSELF - OR THAT YOU ARE A FAILURE OR HAVE LET YOURSELF OR YOUR FAMILY DOWN: 0
2. FEELING DOWN, DEPRESSED OR HOPELESS: 0
SUM OF ALL RESPONSES TO PHQ QUESTIONS 1-9: 0
9. THOUGHTS THAT YOU WOULD BE BETTER OFF DEAD, OR OF HURTING YOURSELF: 0
1. LITTLE INTEREST OR PLEASURE IN DOING THINGS: 0
SUM OF ALL RESPONSES TO PHQ9 QUESTIONS 1 & 2: 0
SUM OF ALL RESPONSES TO PHQ QUESTIONS 1-9: 0
4. FEELING TIRED OR HAVING LITTLE ENERGY: 0
8. MOVING OR SPEAKING SO SLOWLY THAT OTHER PEOPLE COULD HAVE NOTICED. OR THE OPPOSITE, BEING SO FIGETY OR RESTLESS THAT YOU HAVE BEEN MOVING AROUND A LOT MORE THAN USUAL: 0
SUM OF ALL RESPONSES TO PHQ QUESTIONS 1-9: 0
10. IF YOU CHECKED OFF ANY PROBLEMS, HOW DIFFICULT HAVE THESE PROBLEMS MADE IT FOR YOU TO DO YOUR WORK, TAKE CARE OF THINGS AT HOME, OR GET ALONG WITH OTHER PEOPLE: 0
3. TROUBLE FALLING OR STAYING ASLEEP: 0
5. POOR APPETITE OR OVEREATING: 0
SUM OF ALL RESPONSES TO PHQ QUESTIONS 1-9: 0

## 2023-11-01 ASSESSMENT — PAIN DESCRIPTION - PAIN TYPE: TYPE: ACUTE PAIN

## 2023-11-01 ASSESSMENT — LIFESTYLE VARIABLES
HOW OFTEN DO YOU HAVE A DRINK CONTAINING ALCOHOL: NEVER
HOW MANY STANDARD DRINKS CONTAINING ALCOHOL DO YOU HAVE ON A TYPICAL DAY: PATIENT DOES NOT DRINK

## 2023-11-01 ASSESSMENT — COLUMBIA-SUICIDE SEVERITY RATING SCALE - C-SSRS
3. HAVE YOU BEEN THINKING ABOUT HOW YOU MIGHT KILL YOURSELF?: NO
5. HAVE YOU STARTED TO WORK OUT OR WORKED OUT THE DETAILS OF HOW TO KILL YOURSELF? DO YOU INTEND TO CARRY OUT THIS PLAN?: NO
7. DID THIS OCCUR IN THE LAST THREE MONTHS: NO
4. HAVE YOU HAD THESE THOUGHTS AND HAD SOME INTENTION OF ACTING ON THEM?: NO

## 2023-11-01 ASSESSMENT — PAIN - FUNCTIONAL ASSESSMENT
PAIN_FUNCTIONAL_ASSESSMENT: 0-10
PAIN_FUNCTIONAL_ASSESSMENT: 0-10

## 2023-11-01 ASSESSMENT — PAIN DESCRIPTION - LOCATION: LOCATION: GENERALIZED

## 2023-11-01 NOTE — ED TRIAGE NOTES
A & Ox4. Skin pink warm and dry. Pt states she was scheduled to have lower back surgery tomorrow but they postponed it. States today tripped and fell, twisting her body as she fell. States she has neck pain and upper back pain. Complains of tingling in all extremities. Moves all extremities at this time but patient states they all feel funny. C-collar applied upon arrival to triage. Pt states she has been incontinent of bowel and bladder but that was before her fall d/t her lower back injuries. Pt in wheelchair. Taken to room 2.

## 2023-11-01 NOTE — ED PROVIDER NOTES
[facility] ED  eMERGENCY dEPARTMENT eNCOUnter      Pt Name: Gloria Drew  MRN: [id number]  [address] [facility] [place] 1960  Date of evaluation: 11/1/2023  Provider: Allison Lewis MD  Note Started: 11/1/23 5:48 PM EDT    HISTORY OF PRESENT ILLNESS      Chief Complaint   Patient presents with    Fall     This morning. Twisted her body when she fell. Has neck, back, right knee, right ankle pain. Now has tingling/weakness in both arms and legs. The history is provided by the Patient and Significant Other. Gloria Drew is a 66 y.o. female with a PMH clinically significant for HTN, HLD, Obesity, OA, Chronic back pain, Anxiety, and Etoh Abuse, Lumbar Stenosis, DDD presenting to the ED via PV c/o generalized myalgias s/p fall occurring earlier today. Patient states she was in her garage when she just tripped over an object causing her to fall. C/o significant pain in the neck, mid and lower back with pain radiating throughout her body. States she was able to get up and has been able to walk, but very slowly 2/2 pain. States she feels weakness most in the BUE, but has pain at the same time. Is unsure if the pain is limiting her strength at all. Has had urinary and bowel incontinence, but states that it is at baseline. Is already scheduled to have a Lumbar spine procedure with Dr. Tisha Solis on 12/8 and was actually supposed to be having it tomorrow until it was pushed back. Denies any new symptoms, just states they are worse. Ran out of her pain medications. Has also not been taking her ibuprofen due to the procedure initially scheduled for today. Denies any AC. Denies any associated: HA, Lightheadedness, Dizziness, Eye pain, Photophobia, Cough, SOB, CP, Abd pain, Nausea, or Vomiting. States they have otherwise been feeling well. Taking all medications as indicated: yes. Per Chart Review: PMH as noted above obtained via outpatient chart review.    Most recent MRI of the Lumbar spine and evaluation by Dr. Tisha Solis

## 2023-11-01 NOTE — ED NOTES
Patient removed her own c-collar against medical advice. Explained multiple times that the collar needed to stay on until scans were completed and the dr removed it. Patient stated she can't wear it anymore, it hurts too bad. Dr Herr Hurst aware. Patient to CT now.       Marquita Barnes RN  11/01/23 1939

## 2023-11-02 ENCOUNTER — TELEPHONE (OUTPATIENT)
Dept: ORTHOPEDIC SURGERY | Age: 63
End: 2023-11-02

## 2023-11-02 VITALS
HEIGHT: 61 IN | DIASTOLIC BLOOD PRESSURE: 76 MMHG | BODY MASS INDEX: 28.89 KG/M2 | HEART RATE: 80 BPM | SYSTOLIC BLOOD PRESSURE: 94 MMHG | OXYGEN SATURATION: 96 % | TEMPERATURE: 99 F | RESPIRATION RATE: 16 BRPM | WEIGHT: 153 LBS

## 2023-11-02 ASSESSMENT — PAIN - FUNCTIONAL ASSESSMENT: PAIN_FUNCTIONAL_ASSESSMENT: NONE - DENIES PAIN

## 2023-11-02 NOTE — TELEPHONE ENCOUNTER
Patient called in requesting appointment dates for pre-op and post-op, since the surgery was pushed out to Dec. 8th. Please review and call patient back once appointments have been scheduled.

## 2023-11-02 NOTE — ED NOTES
Pt ambulated to restroom with no complications. Provider notified.       Pamela Polanco RN  11/02/23 0001

## 2023-11-02 NOTE — TELEPHONE ENCOUNTER
Patient's  called in requesting pain medication for wife since surgery was scheduled out for Dec. 8th. Patient was admitted into ED yesterday (11-1-23) due to a fall and received a small Rx. Also had blood work and imaging done that they would like Dr. Randy Cook to take a look at. Please review and advise.

## 2023-11-02 NOTE — TELEPHONE ENCOUNTER
[facility]. from Dr. Jacque Deleon office has attempted to contact patient in regards to new surgery date with No success. [facility]. has left a VM asking patient to call office to discuss new surgery date details. Please see new surgery details below for when patient returns call. LM on patients VM asking patient to contact  's office for surgery details.    !!!!Please ask!!!!  Does patient need any medical clearances? ??  _______________________________________    Surgery: 11/29/2023  [x] [facility] AT [place]      Provider:   [x] Sathya Simon MD    Surgery -   THEY WILL CALL THE DAY BEFORE AROUND DINNER TIME TO GIVE THE PATIENT THE TIME TO ARRIVE TO SURGERY.     Pre-op: 11/17/2023 @ 8:45am W/ Fiona Wilson PA-C   [x] [address]      Post-op: 12/12/2023 @ 1:15pm  W/ Dr. Sathya Simon  [x] [address]

## 2023-11-02 NOTE — ED PROVIDER NOTES
Potassium 2.8. Patient given 50 mEq orally and 10 mEq IV. It was charted that she was given an additional 40 mg orally however this is not the case. These were wasted and she did not take these. CT imaging without acute intracranial traumatic injury. Chronic incidental findings noted    Patient able to ambulate. No indication for admission. Oral medication sent to pharmacy. Understands return precautions.      Yosi Sesay MD  11/02/23 0001

## 2023-11-02 NOTE — TELEPHONE ENCOUNTER
Spoke with patient on appointment dates and times. Patient understood and does not need any medical clearances from cardiology or pulmonology.

## 2023-11-03 DIAGNOSIS — M54.50 ACUTE BILATERAL LOW BACK PAIN, UNSPECIFIED WHETHER SCIATICA PRESENT: ICD-10-CM

## 2023-11-03 LAB
EKG ATRIAL RATE: 109 BPM
EKG P AXIS: 11 DEGREES
EKG P-R INTERVAL: 142 MS
EKG Q-T INTERVAL: 352 MS
EKG QRS DURATION: 92 MS
EKG QTC CALCULATION (BAZETT): 474 MS
EKG R AXIS: -57 DEGREES
EKG T AXIS: 46 DEGREES
EKG VENTRICULAR RATE: 109 BPM

## 2023-11-03 RX ORDER — OXYCODONE HYDROCHLORIDE 5 MG/1
5 TABLET ORAL EVERY 6 HOURS PRN
Qty: 30 TABLET | Refills: 0 | Status: SHIPPED | OUTPATIENT
Start: 2023-11-03 | End: 2023-11-11

## 2023-11-08 ENCOUNTER — OFFICE VISIT (OUTPATIENT)
Dept: FAMILY MEDICINE CLINIC | Age: 63
End: 2023-11-08

## 2023-11-08 VITALS
HEIGHT: 61 IN | TEMPERATURE: 97.3 F | SYSTOLIC BLOOD PRESSURE: 116 MMHG | BODY MASS INDEX: 28.89 KG/M2 | OXYGEN SATURATION: 97 % | WEIGHT: 153 LBS | HEART RATE: 97 BPM | DIASTOLIC BLOOD PRESSURE: 76 MMHG

## 2023-11-08 DIAGNOSIS — R11.0 NAUSEA: ICD-10-CM

## 2023-11-08 DIAGNOSIS — K59.00 CONSTIPATION, UNSPECIFIED CONSTIPATION TYPE: Primary | ICD-10-CM

## 2023-11-08 LAB
INFLUENZA A ANTIBODY: NORMAL
INFLUENZA B ANTIBODY: NORMAL
Lab: NORMAL
PERFORMING INSTRUMENT: NORMAL
QC PASS/FAIL: NORMAL
SARS-COV-2, POC: NORMAL

## 2023-11-08 RX ORDER — ONDANSETRON 4 MG/1
4 TABLET, ORALLY DISINTEGRATING ORAL EVERY 6 HOURS PRN
Qty: 30 TABLET | Refills: 0 | Status: SHIPPED | OUTPATIENT
Start: 2023-11-08

## 2023-11-08 RX ORDER — POLYETHYLENE GLYCOL 3350 17 G/17G
17 POWDER, FOR SOLUTION ORAL DAILY
Qty: 510 G | Refills: 1 | Status: SHIPPED | OUTPATIENT
Start: 2023-11-08 | End: 2023-12-08

## 2023-11-08 RX ORDER — SENNOSIDES A AND B 8.6 MG/1
1 TABLET, FILM COATED ORAL 2 TIMES DAILY
Qty: 60 TABLET | Refills: 1 | Status: SHIPPED | OUTPATIENT
Start: 2023-11-08 | End: 2024-11-07

## 2023-11-08 RX ORDER — OMEPRAZOLE 20 MG/1
20 CAPSULE, DELAYED RELEASE ORAL
Qty: 30 CAPSULE | Refills: 0 | Status: SHIPPED | OUTPATIENT
Start: 2023-11-08

## 2023-11-08 ASSESSMENT — ENCOUNTER SYMPTOMS: NAUSEA: 1

## 2023-11-08 NOTE — PROGRESS NOTES
Subjective  Jacques Springer, 66 y.o. female presents today with:  Chief Complaint   Patient presents with    Abdominal Pain     abdominal pain, nausea, stomach feels like its burning and sore taste. Symptoms started 11.3.23. HPI  Presents to [facility] for nausea & constipation   Started to feel unwell Friday   Currently on pain regimen for her back   Last BM several days prior   Having gas  Has tried suppository & oral laxative   Hydrating well   Little appetite.  Bread and banana today  One episode emesis today   Denies fever or chills                   Past Medical History:   Diagnosis Date    Alcoholic fatty liver 43/16/1445    By  Liver U/S 12/09/14    Arthritis     Borderline hyperlipidemia 11/13/2014    has never been on meds    Bunion, right foot     Chronic back pain     Chronic neck pain     Degenerative disc disease, lumbar 11/13/2014    L5-S1    Dermatitis     Elevated liver enzymes 11/13/2014    History of alcohol abuse 12/3/2014    Hypertension     meds > 5 yrs    Hypochloremia 11/13/2014    Hypokalemia     Hyponatremia     Lateral epicondylitis 3/14/2016     Updating Deprecated Diagnoses    Lumbar herniated disc     Obesity (BMI 30-39.9) 11/6/2014    Pedal edema     Restless leg syndrome     RLS (restless legs syndrome)     Thrombocytosis     Tinea pedis 12/16/2014    Toenail fungus     UTI (lower urinary tract infection) 11/19/2014      Past Surgical History:   Procedure Laterality Date    BACK SURGERY  02/27/2012    Dr. Shahzad Oliveros / [facility]UR[facility] 4 disc OR    BUNIONECTOMY Right 12/15/2016    [facility]  [address] [facility]  02/05/2018    Dr. Rica Pizano [facility] SPC W/ARTHRD N/A 9/20/2018    L5-S1 PLIF (POSTERIOR LUMBAR INTERBODY FUSION) performed by Jyothi Phillip MD at [address] [place] [address] Rd 4 disc OR    TONSILLECTOMY       Family History   Problem Relation Age of Onset    High Blood Pressure Mother

## 2023-11-11 ASSESSMENT — ENCOUNTER SYMPTOMS
ABDOMINAL PAIN: 1
BACK PAIN: 1
VOMITING: 1
CONSTIPATION: 1

## 2023-11-17 ENCOUNTER — OFFICE VISIT (OUTPATIENT)
Dept: ORTHOPEDIC SURGERY | Age: 63
End: 2023-11-17

## 2023-11-17 VITALS
WEIGHT: 153 LBS | TEMPERATURE: 97.7 F | HEART RATE: 83 BPM | HEIGHT: 61 IN | OXYGEN SATURATION: 97 % | SYSTOLIC BLOOD PRESSURE: 115 MMHG | BODY MASS INDEX: 28.89 KG/M2 | DIASTOLIC BLOOD PRESSURE: 62 MMHG

## 2023-11-17 DIAGNOSIS — Z01.818 PRE-OP EXAM: ICD-10-CM

## 2023-11-17 DIAGNOSIS — Z01.818 PRE-OP EXAM: Primary | ICD-10-CM

## 2023-11-17 LAB
ALBUMIN SERPL-MCNC: 4.1 G/DL (ref 3.5–4.6)
ALP SERPL-CCNC: 95 U/L (ref 40–130)
ALT SERPL-CCNC: 120 U/L (ref 0–33)
ANION GAP SERPL CALCULATED.3IONS-SCNC: 11 MEQ/L (ref 9–15)
AST SERPL-CCNC: 67 U/L (ref 0–35)
BILIRUB SERPL-MCNC: <0.2 MG/DL (ref 0.2–0.7)
BUN SERPL-MCNC: 22 MG/DL (ref 8–23)
CALCIUM SERPL-MCNC: 9.5 MG/DL (ref 8.5–9.9)
CHLORIDE SERPL-SCNC: 90 MEQ/L (ref 95–107)
CO2 SERPL-SCNC: 32 MEQ/L (ref 20–31)
CREAT SERPL-MCNC: 0.89 MG/DL (ref 0.5–0.9)
ERYTHROCYTE [DISTWIDTH] IN BLOOD BY AUTOMATED COUNT: 11.4 % (ref 11.5–14.5)
GLOBULIN SER CALC-MCNC: 2.6 G/DL (ref 2.3–3.5)
GLUCOSE SERPL-MCNC: 94 MG/DL (ref 70–99)
HCT VFR BLD AUTO: 36.6 % (ref 37–47)
HGB BLD-MCNC: 12.5 G/DL (ref 12–16)
MCH RBC QN AUTO: 31.6 PG (ref 27–31.3)
MCHC RBC AUTO-ENTMCNC: 34.2 % (ref 33–37)
MCV RBC AUTO: 92.7 FL (ref 79.4–94.8)
PLATELET # BLD AUTO: 356 K/UL (ref 130–400)
POTASSIUM SERPL-SCNC: 4.5 MEQ/L (ref 3.4–4.9)
PROT SERPL-MCNC: 6.7 G/DL (ref 6.3–8)
RBC # BLD AUTO: 3.95 M/UL (ref 4.2–5.4)
SODIUM SERPL-SCNC: 133 MEQ/L (ref 135–144)
WBC # BLD AUTO: 6.8 K/UL (ref 4.8–10.8)

## 2023-11-17 PROCEDURE — 99024 POSTOP FOLLOW-UP VISIT: CPT | Performed by: PHYSICIAN ASSISTANT

## 2023-11-17 RX ORDER — SODIUM CHLORIDE 0.9 % (FLUSH) 0.9 %
5-40 SYRINGE (ML) INJECTION EVERY 12 HOURS SCHEDULED
OUTPATIENT
Start: 2023-11-17

## 2023-11-17 RX ORDER — SODIUM CHLORIDE 9 MG/ML
INJECTION, SOLUTION INTRAVENOUS PRN
OUTPATIENT
Start: 2023-11-17

## 2023-11-17 RX ORDER — SODIUM CHLORIDE, SODIUM LACTATE, POTASSIUM CHLORIDE, CALCIUM CHLORIDE 600; 310; 30; 20 MG/100ML; MG/100ML; MG/100ML; MG/100ML
INJECTION, SOLUTION INTRAVENOUS CONTINUOUS
OUTPATIENT
Start: 2023-11-17

## 2023-11-17 RX ORDER — SODIUM CHLORIDE 0.9 % (FLUSH) 0.9 %
5-40 SYRINGE (ML) INJECTION PRN
OUTPATIENT
Start: 2023-11-17

## 2023-11-17 NOTE — PATIENT INSTRUCTIONS
-please ensure that blood work is done at least 3 days before your surgery    -stop taking asprin and motrin until after your surgery. All blood thinners need to be stopped at least 5 days in advance prior to surgery. If you experiencing pain, the only medication you can take for that is tylenol 3-4x / day not to exceed 4000 mg.    -Hibiclens was sent to your pharmacy to . Please follow the instructions provided with the prescription and use daily starting 5 days before surgery.     -no eating / drinking the after midnight the night before surgery.  Sips of water the morning of for all other medications is OK    -our surgery department will reach out the you the day before your surgery and let you know what time to arrive at the [facility] (door [address], same place as where you got blood work)    If you have any questions, please call our office and I will be happy to answer them

## 2023-11-20 DIAGNOSIS — M48.062 SPINAL STENOSIS OF LUMBAR REGION WITH NEUROGENIC CLAUDICATION: Primary | ICD-10-CM

## 2023-11-21 RX ORDER — CHLORHEXIDINE GLUCONATE 4 G/100ML
SOLUTION TOPICAL
Qty: 118 ML | Refills: 0 | Status: SHIPPED | OUTPATIENT
Start: 2023-11-21

## 2023-11-24 RX ORDER — HYDROCODONE BITARTRATE AND ACETAMINOPHEN 5; 325 MG/1; MG/1
1 TABLET ORAL EVERY 4 HOURS PRN
Qty: 30 TABLET | Refills: 0 | Status: SHIPPED | OUTPATIENT
Start: 2023-11-24 | End: 2023-12-01

## 2023-11-28 ENCOUNTER — TELEPHONE (OUTPATIENT)
Dept: ORTHOPEDIC SURGERY | Age: 63
End: 2023-11-28

## 2023-11-28 NOTE — TELEPHONE ENCOUNTER
Patient surgery scheduled for 11/29/2023 has been canceled do to no response from insurance on an approval / denial. Authorization is in pending status with insurance. Surgery will not be able to get rescheduled until office has received an approval.     Provider aware      Patient has been notified and has stated she will have her  call office to get more information as she was unable to talk at the moment.

## 2023-11-29 ENCOUNTER — OFFICE VISIT (OUTPATIENT)
Dept: FAMILY MEDICINE CLINIC | Age: 63
End: 2023-11-29
Payer: COMMERCIAL

## 2023-11-29 VITALS
DIASTOLIC BLOOD PRESSURE: 82 MMHG | HEART RATE: 99 BPM | HEIGHT: 61 IN | TEMPERATURE: 98.3 F | SYSTOLIC BLOOD PRESSURE: 140 MMHG | BODY MASS INDEX: 28.51 KG/M2 | WEIGHT: 151 LBS | OXYGEN SATURATION: 97 %

## 2023-11-29 DIAGNOSIS — L03.113 CELLULITIS OF RIGHT ARM: Primary | ICD-10-CM

## 2023-11-29 DIAGNOSIS — M48.062 SPINAL STENOSIS OF LUMBAR REGION WITH NEUROGENIC CLAUDICATION: ICD-10-CM

## 2023-11-29 DIAGNOSIS — L29.9 ITCHING: ICD-10-CM

## 2023-11-29 DIAGNOSIS — F42.4 DERMATILLOMANIA IN ADULT: ICD-10-CM

## 2023-11-29 PROCEDURE — 3079F DIAST BP 80-89 MM HG: CPT

## 2023-11-29 PROCEDURE — 96372 THER/PROPH/DIAG INJ SC/IM: CPT

## 2023-11-29 PROCEDURE — 99214 OFFICE O/P EST MOD 30 MIN: CPT

## 2023-11-29 PROCEDURE — 3077F SYST BP >= 140 MM HG: CPT

## 2023-11-29 RX ORDER — HYDROCODONE BITARTRATE AND ACETAMINOPHEN 5; 325 MG/1; MG/1
1 TABLET ORAL EVERY 6 HOURS PRN
Qty: 28 TABLET | Refills: 0 | Status: SHIPPED | OUTPATIENT
Start: 2023-11-29 | End: 2023-12-06

## 2023-11-29 RX ORDER — KETOROLAC TROMETHAMINE 30 MG/ML
30 INJECTION, SOLUTION INTRAMUSCULAR; INTRAVENOUS ONCE
Status: COMPLETED | OUTPATIENT
Start: 2023-11-29 | End: 2023-11-29

## 2023-11-29 RX ORDER — IBUPROFEN 600 MG/1
600 TABLET ORAL 3 TIMES DAILY PRN
Qty: 30 TABLET | Refills: 0 | Status: SHIPPED | OUTPATIENT
Start: 2023-11-29

## 2023-11-29 RX ORDER — ONDANSETRON 4 MG/1
4 TABLET, FILM COATED ORAL 3 TIMES DAILY PRN
Qty: 30 TABLET | Refills: 0 | Status: SHIPPED | OUTPATIENT
Start: 2023-11-29

## 2023-11-29 RX ORDER — HYDROXYZINE HYDROCHLORIDE 25 MG/1
25 TABLET, FILM COATED ORAL EVERY 8 HOURS PRN
Qty: 30 TABLET | Refills: 0 | Status: SHIPPED | OUTPATIENT
Start: 2023-11-29 | End: 2023-12-09

## 2023-11-29 RX ORDER — CEPHALEXIN 500 MG/1
500 CAPSULE ORAL 4 TIMES DAILY
Qty: 28 CAPSULE | Refills: 0 | Status: SHIPPED | OUTPATIENT
Start: 2023-11-29 | End: 2023-12-06

## 2023-11-29 RX ADMIN — KETOROLAC TROMETHAMINE 30 MG: 30 INJECTION, SOLUTION INTRAMUSCULAR; INTRAVENOUS at 16:22

## 2023-11-29 ASSESSMENT — ENCOUNTER SYMPTOMS
ROS SKIN COMMENTS: SEE HPI
RHINORRHEA: 0

## 2023-11-29 NOTE — PROGRESS NOTES
After obtaining consent, and per orders of Dr. Dajuan Silva , injection of toradol 30 mg given in Right deltoid by Junito Benito MA. Patient instructed to remain in clinic for 20 minutes afterwards, and to report any adverse reaction to me immediately.

## 2023-11-29 NOTE — PATIENT INSTRUCTIONS
- Please avoid picking or scratching lesions. Try cutting finger nails short to avoid doing so. - Do not put vics vapo rub or oragel on lesions. You may put a thin layer of plain Vaseline if desired. - take atarax for severe itching only. This can cause you to feel drowsy and increases your risk of falls.

## 2023-11-29 NOTE — PROGRESS NOTES
[facility]  Walk-In Clinic Encounter    SUBJECTIVE    CHIEF COMPLAINT:   Chief Complaint   Patient presents with    Wound Infection     Right wrist x 4-5 mo using an antibacterial soap        HPI:  Edgardo Mac is a 66 y.o. female who presents as an established patient to the walk-in clinic today for:     Skin Problem  This is a chronic problem. Episode onset: x5 years. The problem has been gradually worsening since onset. The affected locations include the right wrist. The rash is characterized by pain, itchiness and redness. Pertinent negatives include no facial edema, fatigue, fever or rhinorrhea. Treatments tried: antibacterial soap, oragel, vics vapo rub. Has history of ingrown hairs and picks out her arm hair which causes lesions. Ongoing problem >5 years, states her doctors are aware of it. Over the past few days the lesions/redness has become worse and wanted to get it looked at as she was supposed to have back surgery today but that was canceled.       Past Medical History:   Diagnosis Date    Alcoholic fatty liver 80/25/5638    By  Liver U/S 12/09/14    Arthritis     Borderline hyperlipidemia 11/13/2014    has never been on meds    Bunion, right foot     Chronic back pain     Chronic neck pain     Degenerative disc disease, lumbar 11/13/2014    L5-S1    Dermatitis     Elevated liver enzymes 11/13/2014    History of alcohol abuse 12/3/2014    Hypertension     meds > 5 yrs    Hypochloremia 11/13/2014    Hypokalemia     Hyponatremia     Lateral epicondylitis 3/14/2016     Updating Deprecated Diagnoses    Lumbar herniated disc     Obesity (BMI 30-39.9) 11/6/2014    Pedal edema     Restless leg syndrome     RLS (restless legs syndrome)     Thrombocytosis     Tinea pedis 12/16/2014    Toenail fungus     UTI (lower urinary tract infection) 11/19/2014       Current Outpatient Medications on File Prior to Visit   Medication Sig Dispense Refill    chlorhexidine (HIBICLENS) 4 % external

## 2023-12-05 DIAGNOSIS — M48.062 SPINAL STENOSIS OF LUMBAR REGION WITH NEUROGENIC CLAUDICATION: ICD-10-CM

## 2023-12-05 RX ORDER — CHLORHEXIDINE GLUCONATE 4 G/100ML
SOLUTION TOPICAL
Qty: 118 ML | Refills: 0 | Status: SHIPPED | OUTPATIENT
Start: 2023-12-05

## 2023-12-05 RX ORDER — HYDROCODONE BITARTRATE AND ACETAMINOPHEN 5; 325 MG/1; MG/1
1 TABLET ORAL EVERY 6 HOURS PRN
Qty: 28 TABLET | Refills: 0 | Status: SHIPPED | OUTPATIENT
Start: 2023-12-05 | End: 2023-12-12

## 2023-12-11 ENCOUNTER — PREP FOR PROCEDURE (OUTPATIENT)
Dept: ORTHOPEDIC SURGERY | Age: 63
End: 2023-12-11

## 2023-12-11 DIAGNOSIS — M48.062 SPINAL STENOSIS, LUMBAR REGION WITH NEUROGENIC CLAUDICATION: ICD-10-CM

## 2023-12-11 DIAGNOSIS — M48.061 SPINAL STENOSIS AT L4-L5 LEVEL: ICD-10-CM

## 2023-12-11 DIAGNOSIS — M48.062 SPINAL STENOSIS OF LUMBAR REGION WITH NEUROGENIC CLAUDICATION: ICD-10-CM

## 2023-12-13 RX ORDER — HYDROCODONE BITARTRATE AND ACETAMINOPHEN 5; 325 MG/1; MG/1
1 TABLET ORAL EVERY 6 HOURS PRN
Qty: 28 TABLET | Refills: 0 | Status: SHIPPED | OUTPATIENT
Start: 2023-12-13 | End: 2023-12-20

## 2023-12-15 ENCOUNTER — OFFICE VISIT (OUTPATIENT)
Dept: ORTHOPEDIC SURGERY | Age: 63
End: 2023-12-15

## 2023-12-15 VITALS
HEIGHT: 61 IN | TEMPERATURE: 96.9 F | OXYGEN SATURATION: 99 % | DIASTOLIC BLOOD PRESSURE: 71 MMHG | SYSTOLIC BLOOD PRESSURE: 135 MMHG | BODY MASS INDEX: 28.51 KG/M2 | WEIGHT: 151 LBS | HEART RATE: 99 BPM

## 2023-12-15 DIAGNOSIS — Z01.818 PREOP EXAMINATION: Primary | ICD-10-CM

## 2023-12-15 DIAGNOSIS — L03.113 CELLULITIS OF RIGHT ARM: ICD-10-CM

## 2023-12-15 PROCEDURE — 99024 POSTOP FOLLOW-UP VISIT: CPT | Performed by: PHYSICIAN ASSISTANT

## 2023-12-15 RX ORDER — ONDANSETRON 4 MG/1
4 TABLET, FILM COATED ORAL 3 TIMES DAILY PRN
Qty: 30 TABLET | Refills: 0 | OUTPATIENT
Start: 2023-12-15

## 2023-12-15 RX ORDER — POLYETHYLENE GLYCOL 3350 17 G/17G
POWDER, FOR SOLUTION ORAL
COMMUNITY
Start: 2023-12-09

## 2023-12-15 RX ORDER — SODIUM CHLORIDE 0.9 % (FLUSH) 0.9 %
5-40 SYRINGE (ML) INJECTION PRN
OUTPATIENT
Start: 2023-12-15

## 2023-12-15 RX ORDER — SODIUM CHLORIDE 0.9 % (FLUSH) 0.9 %
5-40 SYRINGE (ML) INJECTION EVERY 12 HOURS SCHEDULED
OUTPATIENT
Start: 2023-12-15

## 2023-12-15 RX ORDER — SODIUM CHLORIDE 9 MG/ML
INJECTION, SOLUTION INTRAVENOUS PRN
OUTPATIENT
Start: 2023-12-15

## 2023-12-15 RX ORDER — SODIUM CHLORIDE, SODIUM LACTATE, POTASSIUM CHLORIDE, CALCIUM CHLORIDE 600; 310; 30; 20 MG/100ML; MG/100ML; MG/100ML; MG/100ML
INJECTION, SOLUTION INTRAVENOUS CONTINUOUS
OUTPATIENT
Start: 2023-12-15

## 2023-12-15 NOTE — PATIENT INSTRUCTIONS
-please ensure that blood work is done at least 3 days before your surgery at the preadmission testing site (located at surgery check in - [address] [place] [address] - entrance B)    -stop taking asprin and motrin until after your surgery. All blood thinners need to be stopped at least 5 days in advance prior to surgery. If you experiencing pain, the only medication you can take for that is tylenol 3-4x / day not to exceed 4000 mg.    -Hibiclens was sent to your pharmacy to . Please follow the instructions provided with the prescription and use daily starting 5 days before surgery.     -no eating / drinking the after midnight the night before surgery.  Sips of water the morning of for all other medications is OK    -our surgery department will reach out the you the day before your surgery and let you know what time to arrive at the [address] [place] [address] Street (door B, same place as where you got blood work)    If you have any questions, please call our office and I will be happy to answer them

## 2023-12-21 ENCOUNTER — HOSPITAL ENCOUNTER (OUTPATIENT)
Age: 63
Setting detail: OBSERVATION
Discharge: HOME | End: 2023-12-22
Attending: ORTHOPAEDIC SURGERY | Admitting: ORTHOPAEDIC SURGERY
Payer: COMMERCIAL

## 2023-12-21 ENCOUNTER — APPOINTMENT (OUTPATIENT)
Dept: GENERAL RADIOLOGY | Age: 63
End: 2023-12-21
Attending: ORTHOPAEDIC SURGERY
Payer: COMMERCIAL

## 2023-12-21 DIAGNOSIS — M48.062 SPINAL STENOSIS, LUMBAR REGION WITH NEUROGENIC CLAUDICATION: Primary | ICD-10-CM

## 2023-12-21 DIAGNOSIS — Z98.1 STATUS POST LUMBAR SPINAL FUSION: ICD-10-CM

## 2023-12-21 DIAGNOSIS — R52 PAIN: ICD-10-CM

## 2023-12-21 PROCEDURE — 7100000001 HC PACU RECOVERY - ADDTL 15 MIN: Performed by: ORTHOPAEDIC SURGERY

## 2023-12-21 PROCEDURE — 2580000003 HC RX 258: Performed by: PHYSICIAN ASSISTANT

## 2023-12-21 PROCEDURE — 94150 VITAL CAPACITY TEST: CPT

## 2023-12-21 PROCEDURE — 6360000002 HC RX W HCPCS: Performed by: STUDENT IN AN ORGANIZED HEALTH CARE EDUCATION/TRAINING PROGRAM

## 2023-12-21 PROCEDURE — 22853 INSJ BIOMECHANICAL DEVICE: CPT | Performed by: ORTHOPAEDIC SURGERY

## 2023-12-21 PROCEDURE — 7100000000 HC PACU RECOVERY - FIRST 15 MIN: Performed by: ORTHOPAEDIC SURGERY

## 2023-12-21 PROCEDURE — 3700000001 HC ADD 15 MINUTES (ANESTHESIA): Performed by: ORTHOPAEDIC SURGERY

## 2023-12-21 PROCEDURE — 63053 LAM FACTC/FRMT ARTHRD LUM EA: CPT | Performed by: ORTHOPAEDIC SURGERY

## 2023-12-21 PROCEDURE — 6360000002 HC RX W HCPCS: Performed by: NURSE PRACTITIONER

## 2023-12-21 PROCEDURE — 2780000010 HC IMPLANT OTHER: Performed by: ORTHOPAEDIC SURGERY

## 2023-12-21 PROCEDURE — 2709999900 HC NON-CHARGEABLE SUPPLY: Performed by: ORTHOPAEDIC SURGERY

## 2023-12-21 PROCEDURE — 6370000000 HC RX 637 (ALT 250 FOR IP): Performed by: ORTHOPAEDIC SURGERY

## 2023-12-21 PROCEDURE — G0378 HOSPITAL OBSERVATION PER HR: HCPCS

## 2023-12-21 PROCEDURE — 2720000010 HC SURG SUPPLY STERILE: Performed by: ORTHOPAEDIC SURGERY

## 2023-12-21 PROCEDURE — 22842 INSERT SPINE FIXATION DEVICE: CPT | Performed by: ORTHOPAEDIC SURGERY

## 2023-12-21 PROCEDURE — 2580000003 HC RX 258: Performed by: ORTHOPAEDIC SURGERY

## 2023-12-21 PROCEDURE — 3600000004 HC SURGERY LEVEL 4 BASE: Performed by: ORTHOPAEDIC SURGERY

## 2023-12-21 PROCEDURE — 6370000000 HC RX 637 (ALT 250 FOR IP): Performed by: NURSE PRACTITIONER

## 2023-12-21 PROCEDURE — 6360000002 HC RX W HCPCS: Performed by: ORTHOPAEDIC SURGERY

## 2023-12-21 PROCEDURE — 3600000014 HC SURGERY LEVEL 4 ADDTL 15MIN: Performed by: ORTHOPAEDIC SURGERY

## 2023-12-21 PROCEDURE — 2500000003 HC RX 250 WO HCPCS: Performed by: ORTHOPAEDIC SURGERY

## 2023-12-21 PROCEDURE — 63052 LAM FACETC/FRMT ARTHRD LUM 1: CPT | Performed by: ORTHOPAEDIC SURGERY

## 2023-12-21 PROCEDURE — 2580000003 HC RX 258: Performed by: NURSE PRACTITIONER

## 2023-12-21 PROCEDURE — 3700000000 HC ANESTHESIA ATTENDED CARE: Performed by: ORTHOPAEDIC SURGERY

## 2023-12-21 PROCEDURE — 22633 ARTHRD CMBN 1NTRSPC LUMBAR: CPT | Performed by: ORTHOPAEDIC SURGERY

## 2023-12-21 PROCEDURE — C1713 ANCHOR/SCREW BN/BN,TIS/BN: HCPCS | Performed by: ORTHOPAEDIC SURGERY

## 2023-12-21 DEVICE — POLYAXIAL HEAD
Type: IMPLANTABLE DEVICE | Site: SPINE LUMBAR | Status: FUNCTIONAL
Brand: MARINER

## 2023-12-21 DEVICE — CONTOURED CROSSBAR, MEDIUM,  5.5 ROD
Type: IMPLANTABLE DEVICE | Site: SPINE LUMBAR | Status: FUNCTIONAL
Brand: MALIBU™

## 2023-12-21 DEVICE — SCREW SPNL DIA5.5MM OPN TULIP LOK RELINE: Type: IMPLANTABLE DEVICE | Site: SPINE LUMBAR | Status: FUNCTIONAL

## 2023-12-21 DEVICE — ACCELL EVO3 PUTTY, 10CC -  DEMINERALIZED HUMAN BONE MIXED WITH POLOXAMER RESORBABLE REVERSE PHASE MEDIUM. ACCELL EVO3 IS FORMULATED INTO A PUTTY FORM AND IS PROVIDED IN A STERILE, SINGLE USE PACKAGE.
Type: IMPLANTABLE DEVICE | Site: SPINE LUMBAR | Status: FUNCTIONAL
Brand: ACCELL EVO3 PUTTY

## 2023-12-21 DEVICE — PRECONTOURED ROD, 5.5 X 65MM
Type: IMPLANTABLE DEVICE | Site: SPINE LUMBAR | Status: FUNCTIONAL
Brand: MALIBU™

## 2023-12-21 DEVICE — SET SCREW
Type: IMPLANTABLE DEVICE | Site: SPINE LUMBAR | Status: FUNCTIONAL
Brand: MARINER

## 2023-12-21 DEVICE — SOLID SCREW 6.50 X 45MM
Type: IMPLANTABLE DEVICE | Site: SPINE LUMBAR | Status: FUNCTIONAL
Brand: MARINER

## 2023-12-21 RX ORDER — HYDROCHLOROTHIAZIDE 25 MG/1
25 TABLET ORAL EVERY MORNING
Status: DISCONTINUED | OUTPATIENT
Start: 2023-12-22 | End: 2023-12-22 | Stop reason: HOSPADM

## 2023-12-21 RX ORDER — ONDANSETRON 2 MG/ML
4 INJECTION INTRAMUSCULAR; INTRAVENOUS EVERY 6 HOURS PRN
Status: DISCONTINUED | OUTPATIENT
Start: 2023-12-21 | End: 2023-12-22 | Stop reason: HOSPADM

## 2023-12-21 RX ORDER — SENNA AND DOCUSATE SODIUM 50; 8.6 MG/1; MG/1
1 TABLET, FILM COATED ORAL 2 TIMES DAILY
Status: DISCONTINUED | OUTPATIENT
Start: 2023-12-21 | End: 2023-12-22 | Stop reason: HOSPADM

## 2023-12-21 RX ORDER — SODIUM CHLORIDE 0.9 % (FLUSH) 0.9 %
5-40 SYRINGE (ML) INJECTION PRN
Status: DISCONTINUED | OUTPATIENT
Start: 2023-12-21 | End: 2023-12-21 | Stop reason: HOSPADM

## 2023-12-21 RX ORDER — SODIUM CHLORIDE 0.9 % (FLUSH) 0.9 %
SYRINGE (ML) INJECTION PRN
Status: DISCONTINUED | OUTPATIENT
Start: 2023-12-21 | End: 2023-12-21 | Stop reason: HOSPADM

## 2023-12-21 RX ORDER — SODIUM CHLORIDE, SODIUM LACTATE, POTASSIUM CHLORIDE, CALCIUM CHLORIDE 600; 310; 30; 20 MG/100ML; MG/100ML; MG/100ML; MG/100ML
INJECTION, SOLUTION INTRAVENOUS CONTINUOUS
Status: DISCONTINUED | OUTPATIENT
Start: 2023-12-21 | End: 2023-12-21 | Stop reason: HOSPADM

## 2023-12-21 RX ORDER — DIPHENHYDRAMINE HYDROCHLORIDE 50 MG/ML
12.5 INJECTION INTRAMUSCULAR; INTRAVENOUS
Status: DISCONTINUED | OUTPATIENT
Start: 2023-12-21 | End: 2023-12-21 | Stop reason: HOSPADM

## 2023-12-21 RX ORDER — SODIUM CHLORIDE 0.9 % (FLUSH) 0.9 %
5-40 SYRINGE (ML) INJECTION PRN
Status: DISCONTINUED | OUTPATIENT
Start: 2023-12-21 | End: 2023-12-22 | Stop reason: HOSPADM

## 2023-12-21 RX ORDER — LISINOPRIL 20 MG/1
40 TABLET ORAL 2 TIMES DAILY
Status: DISCONTINUED | OUTPATIENT
Start: 2023-12-21 | End: 2023-12-22

## 2023-12-21 RX ORDER — OXYCODONE HYDROCHLORIDE 5 MG/1
2.5 TABLET ORAL EVERY 4 HOURS PRN
Status: DISCONTINUED | OUTPATIENT
Start: 2023-12-21 | End: 2023-12-22 | Stop reason: HOSPADM

## 2023-12-21 RX ORDER — SODIUM CHLORIDE 9 MG/ML
INJECTION, SOLUTION INTRAVENOUS CONTINUOUS
Status: DISCONTINUED | OUTPATIENT
Start: 2023-12-21 | End: 2023-12-22 | Stop reason: HOSPADM

## 2023-12-21 RX ORDER — SODIUM CHLORIDE 0.9 % (FLUSH) 0.9 %
5-40 SYRINGE (ML) INJECTION EVERY 12 HOURS SCHEDULED
Status: DISCONTINUED | OUTPATIENT
Start: 2023-12-21 | End: 2023-12-21 | Stop reason: HOSPADM

## 2023-12-21 RX ORDER — LABETALOL HYDROCHLORIDE 5 MG/ML
10 INJECTION, SOLUTION INTRAVENOUS
Status: DISCONTINUED | OUTPATIENT
Start: 2023-12-21 | End: 2023-12-21 | Stop reason: HOSPADM

## 2023-12-21 RX ORDER — CYCLOBENZAPRINE HCL 10 MG
10 TABLET ORAL EVERY 12 HOURS PRN
Status: DISCONTINUED | OUTPATIENT
Start: 2023-12-21 | End: 2023-12-22 | Stop reason: HOSPADM

## 2023-12-21 RX ORDER — ONDANSETRON 4 MG/1
4 TABLET, ORALLY DISINTEGRATING ORAL EVERY 8 HOURS PRN
Status: DISCONTINUED | OUTPATIENT
Start: 2023-12-21 | End: 2023-12-22 | Stop reason: HOSPADM

## 2023-12-21 RX ORDER — OXYCODONE HYDROCHLORIDE 5 MG/1
10 TABLET ORAL PRN
Status: DISCONTINUED | OUTPATIENT
Start: 2023-12-21 | End: 2023-12-21 | Stop reason: HOSPADM

## 2023-12-21 RX ORDER — PROCHLORPERAZINE EDISYLATE 5 MG/ML
5 INJECTION INTRAMUSCULAR; INTRAVENOUS
Status: DISCONTINUED | OUTPATIENT
Start: 2023-12-21 | End: 2023-12-21 | Stop reason: HOSPADM

## 2023-12-21 RX ORDER — SODIUM CHLORIDE 9 MG/ML
INJECTION, SOLUTION INTRAVENOUS PRN
Status: DISCONTINUED | OUTPATIENT
Start: 2023-12-21 | End: 2023-12-21 | Stop reason: HOSPADM

## 2023-12-21 RX ORDER — SODIUM CHLORIDE, SODIUM LACTATE, POTASSIUM CHLORIDE, CALCIUM CHLORIDE 600; 310; 30; 20 MG/100ML; MG/100ML; MG/100ML; MG/100ML
INJECTION, SOLUTION INTRAVENOUS CONTINUOUS
Status: DISCONTINUED | OUTPATIENT
Start: 2023-12-21 | End: 2023-12-22 | Stop reason: HOSPADM

## 2023-12-21 RX ORDER — KETOROLAC TROMETHAMINE 15 MG/ML
7.5 INJECTION, SOLUTION INTRAMUSCULAR; INTRAVENOUS EVERY 6 HOURS
Status: COMPLETED | OUTPATIENT
Start: 2023-12-21 | End: 2023-12-22

## 2023-12-21 RX ORDER — SODIUM CHLORIDE 9 MG/ML
INJECTION, SOLUTION INTRAVENOUS PRN
Status: DISCONTINUED | OUTPATIENT
Start: 2023-12-21 | End: 2023-12-22 | Stop reason: HOSPADM

## 2023-12-21 RX ORDER — MORPHINE SULFATE 2 MG/ML
2 INJECTION, SOLUTION INTRAMUSCULAR; INTRAVENOUS
Status: DISCONTINUED | OUTPATIENT
Start: 2023-12-21 | End: 2023-12-22 | Stop reason: HOSPADM

## 2023-12-21 RX ORDER — HYDROCODONE BITARTRATE AND ACETAMINOPHEN 5; 325 MG/1; MG/1
1 TABLET ORAL EVERY 6 HOURS PRN
Status: ON HOLD | COMMUNITY
End: 2023-12-22 | Stop reason: HOSPADM

## 2023-12-21 RX ORDER — MAGNESIUM HYDROXIDE 1200 MG/15ML
LIQUID ORAL PRN
Status: DISCONTINUED | OUTPATIENT
Start: 2023-12-21 | End: 2023-12-21 | Stop reason: HOSPADM

## 2023-12-21 RX ORDER — SODIUM CHLORIDE 0.9 % (FLUSH) 0.9 %
5-40 SYRINGE (ML) INJECTION EVERY 12 HOURS SCHEDULED
Status: DISCONTINUED | OUTPATIENT
Start: 2023-12-21 | End: 2023-12-22 | Stop reason: HOSPADM

## 2023-12-21 RX ORDER — ACETAMINOPHEN 325 MG/1
650 TABLET ORAL EVERY 6 HOURS
Status: DISCONTINUED | OUTPATIENT
Start: 2023-12-21 | End: 2023-12-22 | Stop reason: HOSPADM

## 2023-12-21 RX ORDER — ONDANSETRON 2 MG/ML
4 INJECTION INTRAMUSCULAR; INTRAVENOUS
Status: COMPLETED | OUTPATIENT
Start: 2023-12-21 | End: 2023-12-21

## 2023-12-21 RX ORDER — HYDRALAZINE HYDROCHLORIDE 20 MG/ML
10 INJECTION INTRAMUSCULAR; INTRAVENOUS
Status: DISCONTINUED | OUTPATIENT
Start: 2023-12-21 | End: 2023-12-21 | Stop reason: HOSPADM

## 2023-12-21 RX ORDER — MORPHINE SULFATE 10 MG/ML
INJECTION, SOLUTION INTRAMUSCULAR; INTRAVENOUS PRN
Status: DISCONTINUED | OUTPATIENT
Start: 2023-12-21 | End: 2023-12-21 | Stop reason: HOSPADM

## 2023-12-21 RX ORDER — OXYCODONE HYDROCHLORIDE 5 MG/1
5 TABLET ORAL PRN
Status: DISCONTINUED | OUTPATIENT
Start: 2023-12-21 | End: 2023-12-21 | Stop reason: HOSPADM

## 2023-12-21 RX ORDER — FENTANYL CITRATE 0.05 MG/ML
25 INJECTION, SOLUTION INTRAMUSCULAR; INTRAVENOUS EVERY 5 MIN PRN
Status: DISCONTINUED | OUTPATIENT
Start: 2023-12-21 | End: 2023-12-21 | Stop reason: HOSPADM

## 2023-12-21 RX ORDER — MAGNESIUM HYDROXIDE 1200 MG/15ML
LIQUID ORAL CONTINUOUS PRN
Status: DISCONTINUED | OUTPATIENT
Start: 2023-12-21 | End: 2023-12-21 | Stop reason: HOSPADM

## 2023-12-21 RX ORDER — METHYLPREDNISOLONE ACETATE 40 MG/ML
INJECTION, SUSPENSION INTRA-ARTICULAR; INTRALESIONAL; INTRAMUSCULAR; SOFT TISSUE PRN
Status: DISCONTINUED | OUTPATIENT
Start: 2023-12-21 | End: 2023-12-21 | Stop reason: HOSPADM

## 2023-12-21 RX ORDER — FENTANYL CITRATE 0.05 MG/ML
50 INJECTION, SOLUTION INTRAMUSCULAR; INTRAVENOUS EVERY 5 MIN PRN
Status: DISCONTINUED | OUTPATIENT
Start: 2023-12-21 | End: 2023-12-21 | Stop reason: HOSPADM

## 2023-12-21 RX ORDER — OXYCODONE HYDROCHLORIDE 5 MG/1
5 TABLET ORAL EVERY 4 HOURS PRN
Status: DISCONTINUED | OUTPATIENT
Start: 2023-12-21 | End: 2023-12-22 | Stop reason: HOSPADM

## 2023-12-21 RX ADMIN — SODIUM CHLORIDE: 9 INJECTION, SOLUTION INTRAVENOUS at 19:45

## 2023-12-21 RX ADMIN — SODIUM CHLORIDE, PRESERVATIVE FREE 10 ML: 5 INJECTION INTRAVENOUS at 21:22

## 2023-12-21 RX ADMIN — CYCLOBENZAPRINE 10 MG: 10 TABLET, FILM COATED ORAL at 19:36

## 2023-12-21 RX ADMIN — ACETAMINOPHEN 650 MG: 325 TABLET ORAL at 19:36

## 2023-12-21 RX ADMIN — SODIUM CHLORIDE, POTASSIUM CHLORIDE, SODIUM LACTATE AND CALCIUM CHLORIDE: 600; 310; 30; 20 INJECTION, SOLUTION INTRAVENOUS at 10:00

## 2023-12-21 RX ADMIN — CEFAZOLIN 2000 MG: 2 INJECTION, POWDER, FOR SOLUTION INTRAMUSCULAR; INTRAVENOUS at 23:13

## 2023-12-21 RX ADMIN — SENNOSIDES AND DOCUSATE SODIUM 1 TABLET: 8.6; 5 TABLET ORAL at 19:36

## 2023-12-21 RX ADMIN — OXYCODONE HYDROCHLORIDE 5 MG: 5 TABLET ORAL at 21:22

## 2023-12-21 RX ADMIN — FENTANYL CITRATE 50 MCG: 0.05 INJECTION, SOLUTION INTRAMUSCULAR; INTRAVENOUS at 17:45

## 2023-12-21 RX ADMIN — FENTANYL CITRATE 50 MCG: 0.05 INJECTION, SOLUTION INTRAMUSCULAR; INTRAVENOUS at 18:07

## 2023-12-21 RX ADMIN — ONDANSETRON 4 MG: 2 INJECTION INTRAMUSCULAR; INTRAVENOUS at 18:16

## 2023-12-21 RX ADMIN — KETOROLAC TROMETHAMINE 7.5 MG: 15 INJECTION, SOLUTION INTRAMUSCULAR; INTRAVENOUS at 19:36

## 2023-12-21 RX ADMIN — MORPHINE SULFATE 2 MG: 2 INJECTION, SOLUTION INTRAMUSCULAR; INTRAVENOUS at 20:19

## 2023-12-21 NOTE — OP NOTE
PLIF Procedure    Patient Name: Nash Erazo  : 1960  MRN: [id number]  Patient Location: [facility] OR Pool/NONE   Account: [de-identified]     Date of Surgery: 2023   Surgeon(s):  Raymundo Gautam DO    Pre-op Diagnosis: L4-5 spondylolisthesis with spinal stenosis    Post-Op Diagnosis: Same    Surgical Procedure(s): Bilateral L5 and S1 removal of tulip end caps and cross-link. L4-5 laminectomy and discectomy with placement of bilateral interbody cages. Placement of bilateral L4 pedicle screws. Intraoperative neuromonitoring. Intraoperative fluoroscopic x-ray. Bone marrow aspiration left posterior iliac crest.  Placement of bilateral rods from L4-S1. Posterior lateral fusion with autograft and allograft bilaterally L4-L5. Assistants: First Assistant: Garfield Crane  Physician Assistant: Earle Hewitt PA-C. Anesthesia type/spinal/blocks/exparel: General    Estimated blood loss: 500    Specimens: None    Drains:   Urinary Catheter 23 Tabor-Temperature (Active)       Implants:   Implant Name Type Inv. Item Serial No.  Lot No. LRB No. Used Action   ISOTIS STRAND PLUS ACCELL BONE MATRIX MEDIUM   [id number]  [id number] N/A 1 Implanted   ISOTIS CANCELLOUS CHIPS, 4-10MM   [id number]  [id number] N/A 1 Implanted   SUBSTITUTE BNE GRFT 10ML MTRX PTTY ACCELL EVO3 - [id number]  SUBSTITUTE BNE GRFT 10ML MTRX PTTY ACCELL EVO3  [employer] ISOTIS-WD [id number] N/A 1 Implanted   SEA SPINE 12 MM WAVEFORM TO [id number] DEGREE 3D INTERBODY     [id number] N/A 1 Implanted   SEA SPINE 12MM WAVEFORM TO [id number] MM 5 DEGREE 3D INTERBODY     [id number] N/A 1 Implanted   SCREW SPNL DIA5. 5MM OPN TULIP MEGAN RELINE - [id number]  SCREW SPNL DIA5. 5MM OPN TULIP MEGAN RELINE  [employer]WD  N/A 4 Implanted   SCREW SPNL L45MM [id number] SLD MARINER - [id number]  SCREW SPNL L45MM [id number] SLD MARINER  [employer] [facility]  N/A 2 Implanted   HEAD SPNL POLYAX MARINER - [id number]  HEAD SPNL [address] was used for skin approximation. The wound was infiltrated with a local anesthetic. Prineo mesh and glue dressing was then placed over top of the incision site. A final sterile dressing was also applied. The patient tolerated the procedure well, there were no complications, and they were transferred to the PACU in stable condition.     Electronically signed by Efrem Santos DO on 12/21/23 at 5:13 PM EST

## 2023-12-21 NOTE — FLOWSHEET NOTE
80- Pt arrived to PACU, thrashing in bed, pulling oxygen mask off and attempting to pull IV out, medicated per anesthesia-KGW  1735- Assessment completed at this time, Pt A&Ox4, follows commands, denies chest pain/SOB, denies nausea/vomiting, strength equal in bilateral upper and lower extremities, denies numbness/tingling to bilateral upper and lower extremities, mepilex dressing to back clean, dry, and intact, ELIANE drain present and compressed with bright red bloody drainage in the bulb, small amount of facial swelling noted, pt states pain is 10/10 will medicate accordingly-KGW  1800- Pt complaining of both hands being numb, pt states that this is normal and not a new symptom and she has had this before-KGW  1820- Report called to [address] [place] [address] on 2 [address] [place] [address] Avenue  Electronically signed by Blanche Lehman RN on 12/21/2023 at 6:43 PM

## 2023-12-21 NOTE — PROGRESS NOTES
Dr Otoniel Hoskins and Dr Racheal Knox aware of pt's wound to RFA, per Dr Otoniel Hoskins to place dry nonstick dressing to RFA and wrap prior to procedure

## 2023-12-22 ENCOUNTER — APPOINTMENT (OUTPATIENT)
Dept: GENERAL RADIOLOGY | Age: 63
End: 2023-12-22
Attending: ORTHOPAEDIC SURGERY
Payer: COMMERCIAL

## 2023-12-22 VITALS
HEART RATE: 80 BPM | DIASTOLIC BLOOD PRESSURE: 63 MMHG | RESPIRATION RATE: 18 BRPM | OXYGEN SATURATION: 100 % | SYSTOLIC BLOOD PRESSURE: 111 MMHG | HEIGHT: 61 IN | TEMPERATURE: 98.8 F | WEIGHT: 146 LBS | BODY MASS INDEX: 27.56 KG/M2

## 2023-12-22 LAB
ANION GAP SERPL CALCULATED.3IONS-SCNC: 11 MEQ/L (ref 9–15)
BASOPHILS # BLD: 0 K/UL (ref 0–0.2)
BASOPHILS # BLD: 0 K/UL (ref 0–0.2)
BASOPHILS NFR BLD: 0 %
BASOPHILS NFR BLD: 0.1 %
BUN SERPL-MCNC: 11 MG/DL (ref 8–23)
CALCIUM SERPL-MCNC: 8 MG/DL (ref 8.5–9.9)
CHLORIDE SERPL-SCNC: 97 MEQ/L (ref 95–107)
CO2 SERPL-SCNC: 25 MEQ/L (ref 20–31)
CREAT SERPL-MCNC: 0.85 MG/DL (ref 0.5–0.9)
EOSINOPHIL # BLD: 0 K/UL (ref 0–0.7)
EOSINOPHIL # BLD: 0 K/UL (ref 0–0.7)
EOSINOPHIL NFR BLD: 0 %
EOSINOPHIL NFR BLD: 0 %
ERYTHROCYTE [DISTWIDTH] IN BLOOD BY AUTOMATED COUNT: 12.1 % (ref 11.5–14.5)
ERYTHROCYTE [DISTWIDTH] IN BLOOD BY AUTOMATED COUNT: 12.3 % (ref 11.5–14.5)
GLUCOSE SERPL-MCNC: 104 MG/DL (ref 70–99)
HCT VFR BLD AUTO: 21 % (ref 37–47)
HCT VFR BLD AUTO: 24.2 % (ref 37–47)
HGB BLD-MCNC: 7.3 G/DL (ref 12–16)
HGB BLD-MCNC: 8.4 G/DL (ref 12–16)
LYMPHOCYTES # BLD: 1 K/UL (ref 1–4.8)
LYMPHOCYTES # BLD: 1.7 K/UL (ref 1–4.8)
LYMPHOCYTES NFR BLD: 13.9 %
LYMPHOCYTES NFR BLD: 9.2 %
MCH RBC QN AUTO: 32.2 PG (ref 27–31.3)
MCH RBC QN AUTO: 32.3 PG (ref 27–31.3)
MCHC RBC AUTO-ENTMCNC: 34.7 % (ref 33–37)
MCHC RBC AUTO-ENTMCNC: 34.8 % (ref 33–37)
MCV RBC AUTO: 92.7 FL (ref 79.4–94.8)
MCV RBC AUTO: 92.9 FL (ref 79.4–94.8)
MONOCYTES # BLD: 0.5 K/UL (ref 0.2–0.8)
MONOCYTES # BLD: 0.9 K/UL (ref 0.2–0.8)
MONOCYTES NFR BLD: 5.1 %
MONOCYTES NFR BLD: 7.1 %
NEUTROPHILS # BLD: 8.8 K/UL (ref 1.4–6.5)
NEUTROPHILS # BLD: 9.3 K/UL (ref 1.4–6.5)
NEUTS SEG NFR BLD: 78.4 %
NEUTS SEG NFR BLD: 85.3 %
PLATELET # BLD AUTO: 216 K/UL (ref 130–400)
PLATELET # BLD AUTO: 252 K/UL (ref 130–400)
POTASSIUM SERPL-SCNC: 3.7 MEQ/L (ref 3.4–4.9)
RBC # BLD AUTO: 2.26 M/UL (ref 4.2–5.4)
RBC # BLD AUTO: 2.61 M/UL (ref 4.2–5.4)
SODIUM SERPL-SCNC: 133 MEQ/L (ref 135–144)
WBC # BLD AUTO: 10.3 K/UL (ref 4.8–10.8)
WBC # BLD AUTO: 11.9 K/UL (ref 4.8–10.8)

## 2023-12-22 PROCEDURE — G0378 HOSPITAL OBSERVATION PER HR: HCPCS

## 2023-12-22 PROCEDURE — 72100 X-RAY EXAM L-S SPINE 2/3 VWS: CPT

## 2023-12-22 PROCEDURE — 6370000000 HC RX 637 (ALT 250 FOR IP): Performed by: NURSE PRACTITIONER

## 2023-12-22 PROCEDURE — 36415 COLL VENOUS BLD VENIPUNCTURE: CPT

## 2023-12-22 PROCEDURE — 97162 PT EVAL MOD COMPLEX 30 MIN: CPT

## 2023-12-22 PROCEDURE — 96376 TX/PRO/DX INJ SAME DRUG ADON: CPT

## 2023-12-22 PROCEDURE — 6360000002 HC RX W HCPCS: Performed by: NURSE PRACTITIONER

## 2023-12-22 PROCEDURE — 85025 COMPLETE CBC W/AUTO DIFF WBC: CPT

## 2023-12-22 PROCEDURE — 96374 THER/PROPH/DIAG INJ IV PUSH: CPT

## 2023-12-22 PROCEDURE — 80048 BASIC METABOLIC PNL TOTAL CA: CPT

## 2023-12-22 PROCEDURE — 97165 OT EVAL LOW COMPLEX 30 MIN: CPT

## 2023-12-22 PROCEDURE — 6370000000 HC RX 637 (ALT 250 FOR IP): Performed by: INTERNAL MEDICINE

## 2023-12-22 PROCEDURE — 2580000003 HC RX 258: Performed by: NURSE PRACTITIONER

## 2023-12-22 RX ORDER — LISINOPRIL 20 MG/1
40 TABLET ORAL DAILY
Status: DISCONTINUED | OUTPATIENT
Start: 2023-12-23 | End: 2023-12-22 | Stop reason: HOSPADM

## 2023-12-22 RX ORDER — OXYCODONE HYDROCHLORIDE 5 MG/1
5 TABLET ORAL EVERY 4 HOURS PRN
Qty: 40 TABLET | Refills: 0 | Status: SHIPPED | OUTPATIENT
Start: 2023-12-22 | End: 2023-12-29

## 2023-12-22 RX ORDER — CYCLOBENZAPRINE HCL 10 MG
10 TABLET ORAL EVERY 12 HOURS PRN
Qty: 30 TABLET | Refills: 0 | Status: SHIPPED | OUTPATIENT
Start: 2023-12-22 | End: 2024-01-01

## 2023-12-22 RX ORDER — SENNA AND DOCUSATE SODIUM 50; 8.6 MG/1; MG/1
1 TABLET, FILM COATED ORAL 2 TIMES DAILY
Qty: 60 TABLET | Refills: 0 | Status: SHIPPED | OUTPATIENT
Start: 2023-12-22 | End: 2024-01-21

## 2023-12-22 RX ADMIN — ACETAMINOPHEN 650 MG: 325 TABLET ORAL at 12:24

## 2023-12-22 RX ADMIN — OXYCODONE HYDROCHLORIDE 5 MG: 5 TABLET ORAL at 05:36

## 2023-12-22 RX ADMIN — OXYCODONE HYDROCHLORIDE 5 MG: 5 TABLET ORAL at 12:24

## 2023-12-22 RX ADMIN — CEFAZOLIN 2000 MG: 2 INJECTION, POWDER, FOR SOLUTION INTRAMUSCULAR; INTRAVENOUS at 06:30

## 2023-12-22 RX ADMIN — CYCLOBENZAPRINE 10 MG: 10 TABLET, FILM COATED ORAL at 08:55

## 2023-12-22 RX ADMIN — ACETAMINOPHEN 650 MG: 325 TABLET ORAL at 00:51

## 2023-12-22 RX ADMIN — KETOROLAC TROMETHAMINE 7.5 MG: 15 INJECTION, SOLUTION INTRAMUSCULAR; INTRAVENOUS at 12:24

## 2023-12-22 RX ADMIN — KETOROLAC TROMETHAMINE 7.5 MG: 15 INJECTION, SOLUTION INTRAMUSCULAR; INTRAVENOUS at 00:51

## 2023-12-22 RX ADMIN — SODIUM CHLORIDE: 9 INJECTION, SOLUTION INTRAVENOUS at 04:28

## 2023-12-22 RX ADMIN — SODIUM CHLORIDE: 9 INJECTION, SOLUTION INTRAVENOUS at 10:40

## 2023-12-22 RX ADMIN — HYDROCHLOROTHIAZIDE 25 MG: 25 TABLET ORAL at 08:55

## 2023-12-22 RX ADMIN — ACETAMINOPHEN 650 MG: 325 TABLET ORAL at 06:28

## 2023-12-22 RX ADMIN — SENNOSIDES AND DOCUSATE SODIUM 1 TABLET: 8.6; 5 TABLET ORAL at 08:55

## 2023-12-22 RX ADMIN — OXYCODONE HYDROCHLORIDE 5 MG: 5 TABLET ORAL at 01:27

## 2023-12-22 RX ADMIN — KETOROLAC TROMETHAMINE 7.5 MG: 15 INJECTION, SOLUTION INTRAMUSCULAR; INTRAVENOUS at 06:28

## 2023-12-22 NOTE — DISCHARGE INSTRUCTIONS
dication given may have significant effects after discharge. Therefore on the day of surgery:  1) you should be accompanied by a responsible adult upon discharge and for 24 hours after surgery. Do not drive a motor vehicle, operate machinery, power tools or appliance, drink alcoholic beverages, or make critical decisions for 24 hours  2) Be aware of dizziness, which may cause a fall. Change positions slowly. 3) Eating: you may resume your regular diet but it is better to increase intake slowly with mild foods and working up to your regular diet. 4) Nausea/Vomiting: Nausea and vomiting may occur as you become more active or begin to increase food intake. If this should happen, decrease activity and return to liquids. 5) Pain: Your surgeon may have given you a prescription for pain medication. Take pain medication with food as prescribed. Pain medication may cause constipation, so drink plenty of fluids. You may need to use laxatives. 6) Ice: You may use a cool pack to operative site for 20 min 5-6 times a day as needed for comfort. 8) Dressing: Change dressing as frequently as needed to keep clean and dry. Remove all sticky tape in 7 days post op- 9/29/23 May shower  Do not put soap or soak on the incision until healed. 9) INCREASE ACTIVITY AS TOLERATED AND AS INSTRUCTED. GO BY HOW YOU FEEL. 10) See physical therapist when advised by your physician      Contact OFFICE IF  Increased redness, swelling, excess drainage, and/or pain to surgery site. As well as new onset fevers and or chills. These could signify an infection. Calf or thigh tenderness to touch as well as increased swelling or redness. This could signify a clot formation. Numbness or tingling to an area around the incision site or below the incision site (toes). Or if the operative extremity becomes cold, blue. Any rash appears, increased  or new onset nausea/vomiting occur. This may indicate a reaction to a medication.   Temp is 38.5

## 2023-12-22 NOTE — CARE COORDINATION
MET WITH PT AT BEDSIDE. PT IS OBSERVATION STATUS. PT FROM HOME WITH SPOUSE. INDEPENDENT. HAS ROLLATOR. HAS HANDICAP TUB/SHOWERS, GRAB BARS, RAISED TOILET SEATS. NO HD, NO O2, NO VA.   DC PLAN TO RETURN HOME W/SPOUSE. PT DENIES NEEDS. PT/OT EVALS PENDING.

## 2023-12-22 NOTE — PROGRESS NOTES
Physical Therapy Med Surg Initial Assessment  Facility/Department: [facility]  Room: [facility]801-       NAME: Karla Pineda  : 1960 (66 y.o.)  MRN: [id number]  CODE STATUS: Full Code    Date of Service: 2023    Patient Diagnosis(es): Spinal stenosis, lumbar region with neurogenic claudication [M48.062]  Status post lumbar spinal fusion [Z98.1]   No chief complaint on file.     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    Spondylolisthesis at L4-L5 level 2023    Hirsutism 2022    Anxiety 2022    Lumbar spondylosis 2018    Abnormal levels of other serum enzymes 2018    Cervical nerve root disorder 2017    Chronic neck pain     Hallux abducto valgus 10/26/2016    Metatarsus primus varus 10/26/2016    Bunion, right foot     RLS (restless legs syndrome)     Carpal tunnel syndrome of left wrist 07/15/2016    Cervical spondylosis without myelopathy 10/22/2015    Lumbosacral spondylosis without myelopathy 2015    Tinea unguium 2014    Lumbar herniated disc     Degenerative disc disease, lumbar 2014    Hypochloremia 2014    Hypertension 2014    Obesity (BMI 30-39.9) 2014    Chronic back pain         Past Medical History:   Diagnosis Date    Alcoholic fatty liver     By  Liver U/S 14    Arthritis     Borderline hyperlipidemia 2014    has never been on meds    Bunion, right foot     Chronic back pain     Chronic neck pain     Degenerative disc disease, lumbar 2014    L5-S1    Dermatitis     Elevated liver enzymes 2014    History of alcohol abuse 12/3/2014    Hypertension     meds > 5 yrs balance; Increased pain;Decreased coordination  Decision Making: Medium Complexity  History: High  Exam: Med  Clinical Presentation: Med    Therapy Prognosis: Good  Barriers to Learning: none         DISCHARGE RECOMMENDATIONS:  Discharge Recommendations: Continue to assess pending progress, Patient would benefit from continued therapy after discharge    Assessment: Continued PT indicated to progress mobility and facilitate DC at highest level of indep and safety. Rec f/u PT upon DC. Requires PT Follow-Up: Yes       PLAN OF CARE:  Physical Therapy Plan  General Plan: 1 time a day 3-6 times a week  Current Treatment Recommendations: Strengthening, ROM, Balance training, Functional mobility training, Transfer training, ADL/Self-care training, Gait training, Endurance training, Neuromuscular re-education, Safety education & training, Patient/Caregiver education & training, Equipment evaluation, education, & procurement, Positioning, Modalities    Safety Devices  Type of Devices: All fall risk precautions in place    Goals:  Long Term Goals  Long Term Goal 1: Pt to complete bed mobility with indep  Long Term Goal 2: Pt to complete transfers with indep  Long Term Goal 3: Pt to ambulate 150ft with rollator indep    AMPAC (6 CLICK) BASIC MOBILITY  AM-PAC Inpatient Mobility Raw Score : 18     Therapy Time:   Individual   Time In 1053   Time Out 1107   Minutes 1431 [place], [place], 12/22/23 at 12:36 PM         Definitions for assistance levels  Independent = pt does not require any physical supervision or assistance from another person for activity completion. Device may be needed.   Stand by assistance = pt requires verbal cues or instructions from another person, close to but not touching, to perform the activity  Minimal assistance= pt performs 75% or more of the activity; assistance is required to complete the activity  Moderate assistance= pt performs 50% of the activity; assistance is required to complete the

## 2023-12-22 NOTE — DISCHARGE SUMMARY
Discharge summary  This patient Rebecca Contreras was admitted to the hospital on 12/21/2023  after undergoing Procedure(s) (LRB):  L4-5 laminectomy with bilateral foraminotomies, extension of the fusion to L4, revision of posterior lumbar interbody fusion with rods, screws, interbody cages. (N/A) without complications that morning. During the postoperative period,while in hospital, patient was medically managed by the hospitalist. Please see medial notes and H&P for patients additional diagnoses. Ortho agrees with all medical diagnoses and treatments while patient in hospital.  No significant or unexpected findings or abnormal ortho imaging were noted during the hospital stay    Hospital course  Patient tolerated surgical procedure well and there was no complications. Patient progressed adequtly through their recovery during hospital stay including PT and rehabilitation. Patient was then D/C on  to Home  in stable condition. Patient was instructed on the use of pain medications, the signs and symptoms of infection, and was given our number to call should they have any questions or concerns following discharge.

## 2023-12-22 NOTE — PROGRESS NOTES
currently)  Ambulation Assistance: Independent (Rollator PRN but states she furniture surfs d/t weak arms)  Transfer Assistance: Independent  Active : No  Patient's  Info: Spouse    OBJECTIVE:     Orientation Status:  Orientation  Overall Orientation Status: Within Functional Limits    Observation:  Observation/Palpation  Posture: Good  Observation: Pt alert and attentive, no acute distress, brace donnned    Cognition Status:  Cognition  Overall Cognitive Status: WFL    Perception Status:  Perception  Overall Perceptual Status: WFL    Vision and Hearing Status:  Vision  Vision Exceptions: Wears glasses for reading  Hearing  Hearing: Within functional limits   Vision - Basic Assessment  Prior Vision: Wears glasses only for reading  Visual History: No significant visual history  Patient Visual Report: No visual complaint reported. Visual Field Cut: No    GROSS ASSESSMENT AROM/PROM:  AROM: Within functional limits       ROM:   LUE AROM (degrees)  LUE AROM : WFL  Left Hand AROM (degrees)  Left Hand AROM: WFL  RUE AROM (degrees)  RUE AROM : WFL  Right Hand AROM (degrees)  Right Hand AROM: WFL    UE STRENGTH:  Strength: Generally decreased, functional    UE COORDINATION:  Coordination: Generally decreased, functional    UE TONE:  Tone: Normal    UE SENSATION:  Sensation: Impaired (B hands tingling, reports LEs are improving since sx)    Hand Dominance:  Hand Dominance  Hand Dominance: Right    ADL Status:  ADL  Feeding: Independent  Grooming: Modified independent   UE Bathing: Modified independent   LE Bathing: Supervision  UE Dressing: Modified independent   LE Dressing: Supervision  Toileting: Modified independent   Additional Comments: Simulated ADLs as above.  Pt limited d/t fatigue and decreased balance, min difficulty reaching feet  Toilet Transfers  Toilet Transfer: Unable to assess  Toilet Transfers Comments: Declines need, denies difficulty earlier    Functional Mobility:    Transfers  Sit to stand:

## 2023-12-22 NOTE — PROGRESS NOTES
12/21/2023    1915: Patient arrived to [address] [place] [address] from PACU.   Electronically signed by Wade Gamino RN on 12/21/2023 at 7:58 PM

## 2023-12-22 NOTE — PLAN OF CARE
See OT evaluation for all goals and OT POC.  Electronically signed by ZORAIDA Alexandre/L on 12/22/2023 at 12:43 PM

## 2023-12-23 NOTE — PROGRESS NOTES
Physical Therapy  Facility/Department: [facility] MED SURG T505/F141-12  Physical Therapy Discharge      NAME: Shoshana Chaney    : 1960 (66 y.o.)  MRN: [id number]    Account: [de-identified]  Gender: female      Patient has been discharged from acute care hospital. DC patient from current PT program.      Electronically signed by Tino Torres PT on 23 at 1:08 PM EST

## 2023-12-28 ENCOUNTER — TELEPHONE (OUTPATIENT)
Dept: ORTHOPEDIC SURGERY | Age: 63
End: 2023-12-28

## 2023-12-28 NOTE — TELEPHONE ENCOUNTER
Pt.  Twylla Oppenheim called and wants to know if she could get a different muscle relaxer, he states that the flexeril is not helping and she is still getting multiple cramps. Pt would like to switch to something stronger. Pt has an upcomming appt 1/3/24.

## 2023-12-29 RX ORDER — METHOCARBAMOL 750 MG/1
750 TABLET, FILM COATED ORAL 3 TIMES DAILY PRN
Qty: 30 TABLET | Refills: 3 | Status: SHIPPED | OUTPATIENT
Start: 2023-12-29 | End: 2024-01-05

## 2024-01-03 ENCOUNTER — OFFICE VISIT (OUTPATIENT)
Dept: ORTHOPEDIC SURGERY | Age: 64
End: 2024-01-03

## 2024-01-03 VITALS
OXYGEN SATURATION: 98 % | WEIGHT: 146 LBS | HEIGHT: 61 IN | BODY MASS INDEX: 27.56 KG/M2 | HEART RATE: 88 BPM | TEMPERATURE: 96.9 F

## 2024-01-03 DIAGNOSIS — M48.062 SPINAL STENOSIS OF LUMBAR REGION WITH NEUROGENIC CLAUDICATION: Primary | ICD-10-CM

## 2024-01-03 PROCEDURE — 99024 POSTOP FOLLOW-UP VISIT: CPT | Performed by: ORTHOPAEDIC SURGERY

## 2024-01-03 RX ORDER — OXYCODONE HYDROCHLORIDE AND ACETAMINOPHEN 5; 325 MG/1; MG/1
1 TABLET ORAL EVERY 4 HOURS PRN
Qty: 30 TABLET | Refills: 0 | Status: SHIPPED | OUTPATIENT
Start: 2024-01-03 | End: 2024-01-10

## 2024-01-03 RX ORDER — TIZANIDINE 2 MG/1
2 TABLET ORAL 4 TIMES DAILY PRN
Qty: 40 TABLET | Refills: 0 | Status: SHIPPED | OUTPATIENT
Start: 2024-01-03

## 2024-01-03 NOTE — PROGRESS NOTES
Subjective:      Patient ID: Joanne Masterson is a 63 y.o. female who presents today for:  Chief Complaint   Patient presents with    Follow-up     Pt presents here for 1st post-op after surgery with  on 12/21/23 L4-5 laminectomy with bilateral foraminotomies, extension of the fusion to L4, revision of posterior lumbar interbody fusion with rods, screws, interbody cages.       Subjective/Objective/Assessment/Plan:     SUBJECTIVE -here for follow-up visit after undergoing lumbar spine fusion.  Both her and her  noticed significant improvement in her symptoms however her main complaint is cramping in her hips bilaterally.    OBJECTIVE -incision is well-approximated without signs of infection.  XR LUMBAR SPINE (2-3 VIEWS)  Narrative: EXAMINATION:  XRAY VIEWS OF THE LUMBAR SPINE    12/22/2023 8:00 am    COMPARISON:  September 19th 2023 1122 hours.    HISTORY:  ORDERING SYSTEM PROVIDED HISTORY: standing xray  TECHNOLOGIST PROVIDED HISTORY:  Reason for exam:->standing xray  What reading provider will be dictating this exam?->CRC    FINDINGS:  Three views of the lumbar spine are submitted weight-bearing.    A surgical drain is seen overlying the upper abdomen.  Correlate with patient  medical device history.    Bowel gas is seen in both large small bowel.  A few scattered air-fluid  levels in nondilated small bowel.    Tubal ligation clips are seen overlying the pelvis.  There are now pedicle  screw and edgar spanning the L3, L4 and L5 levels.  There is now been interval  interbody fusion between the L3 no for level.  As well as a prior old  interbody fusion between L4 and L5.    Satisfactory alignment.    No acute fracture.  Impression: STATUS POST INTERVAL REVISION LUMBAR SPINE AS DESCRIBED ABOVE.    A FEW SCATTERED AIR-FLUID LEVELS IN NONDILATED SMALL BOWEL.  MAY REPRESENT OF  POSTOPERATIVE ILEUS.  CORRELATE CLINICALLY.      ASSESSMENT -    Diagnosis Orders   1. Spinal stenosis of lumbar region with

## 2024-01-09 ENCOUNTER — TELEPHONE (OUTPATIENT)
Dept: ORTHOPEDIC SURGERY | Age: 64
End: 2024-01-09

## 2024-01-09 DIAGNOSIS — M48.062 SPINAL STENOSIS OF LUMBAR REGION WITH NEUROGENIC CLAUDICATION: Primary | ICD-10-CM

## 2024-01-09 NOTE — TELEPHONE ENCOUNTER
Patient asking for refill on pain medication. oxyCODONE-acetaminophen (PERCOCET) 5-325 MG per tablet     Please advise     [facility]      Patient has also stated that Bone stem is not covered by her insurance. Patient asking what she should do?

## 2024-01-10 ENCOUNTER — TELEPHONE (OUTPATIENT)
Dept: ORTHOPEDIC SURGERY | Age: 64
End: 2024-01-10

## 2024-01-10 RX ORDER — OXYCODONE HYDROCHLORIDE 5 MG/1
5 CAPSULE ORAL EVERY 4 HOURS PRN
Qty: 30 CAPSULE | Refills: 0 | Status: SHIPPED | OUTPATIENT
Start: 2024-01-10 | End: 2024-01-17

## 2024-01-10 NOTE — TELEPHONE ENCOUNTER
[facility] Called stating that they do not have Capsules for the Oxycodone they sent in. They only have tablets and wanted to know if that was okay. I Confirmed  that it is okay to give them tablets. I also confirmed that it is the same mg.

## 2024-01-16 DIAGNOSIS — M48.062 SPINAL STENOSIS OF LUMBAR REGION WITH NEUROGENIC CLAUDICATION: ICD-10-CM

## 2024-01-16 RX ORDER — OXYCODONE HYDROCHLORIDE 5 MG/1
5 CAPSULE ORAL EVERY 4 HOURS PRN
Qty: 30 CAPSULE | Refills: 0 | Status: CANCELLED | OUTPATIENT
Start: 2024-01-16 | End: 2024-01-23

## 2024-01-17 ENCOUNTER — TELEPHONE (OUTPATIENT)
Dept: ORTHOPEDIC SURGERY | Age: 64
End: 2024-01-17

## 2024-01-17 RX ORDER — OXYCODONE HYDROCHLORIDE 5 MG/1
5 CAPSULE ORAL EVERY 4 HOURS PRN
Qty: 30 CAPSULE | Refills: 0 | Status: SHIPPED | OUTPATIENT
Start: 2024-01-17 | End: 2024-01-24

## 2024-01-17 NOTE — TELEPHONE ENCOUNTER
[facility] called asking for a new script for Tablets because they do not carry Capsules.   Dr. Samaniego Prescribed it.   OXYCODONE 5Mg     Pharmacy is     [facility] - [place], [place] - [address] -  [phone] - F [phone] [address] [facility] [address]  Phone: [phone]  Fax: [phone]

## 2024-01-17 NOTE — TELEPHONE ENCOUNTER
[facility] Called stating that they do not have Capsules for the Oxycodone they sent in. They only have tablets and wanted to know if that was okay. I Confirmed that it is okay to give them tablets. I also confirmed that it is the same mg.

## 2024-01-18 DIAGNOSIS — M48.062 SPINAL STENOSIS OF LUMBAR REGION WITH NEUROGENIC CLAUDICATION: Primary | ICD-10-CM

## 2024-01-18 RX ORDER — OXYCODONE HYDROCHLORIDE 5 MG/1
5 TABLET ORAL EVERY 6 HOURS PRN
Qty: 20 TABLET | Refills: 0 | Status: SHIPPED | OUTPATIENT
Start: 2024-01-18 | End: 2024-01-23

## 2024-01-31 ENCOUNTER — OFFICE VISIT (OUTPATIENT)
Dept: ORTHOPEDIC SURGERY | Age: 64
End: 2024-01-31
Payer: COMMERCIAL

## 2024-01-31 ENCOUNTER — HOSPITAL ENCOUNTER (OUTPATIENT)
Dept: ORTHOPEDIC SURGERY | Age: 64
Discharge: HOME | End: 2024-02-02
Payer: COMMERCIAL

## 2024-01-31 DIAGNOSIS — M48.062 SPINAL STENOSIS OF LUMBAR REGION WITH NEUROGENIC CLAUDICATION: ICD-10-CM

## 2024-01-31 DIAGNOSIS — M48.062 SPINAL STENOSIS OF LUMBAR REGION WITH NEUROGENIC CLAUDICATION: Primary | ICD-10-CM

## 2024-01-31 PROCEDURE — 72100 X-RAY EXAM L-S SPINE 2/3 VWS: CPT | Performed by: ORTHOPAEDIC SURGERY

## 2024-01-31 PROCEDURE — 99024 POSTOP FOLLOW-UP VISIT: CPT | Performed by: ORTHOPAEDIC SURGERY

## 2024-01-31 PROCEDURE — 72100 X-RAY EXAM L-S SPINE 2/3 VWS: CPT

## 2024-01-31 RX ORDER — TIZANIDINE 2 MG/1
2 TABLET ORAL 4 TIMES DAILY PRN
Qty: 40 TABLET | Refills: 0 | Status: CANCELLED | OUTPATIENT
Start: 2024-01-31

## 2024-01-31 RX ORDER — TIZANIDINE 2 MG/1
2 TABLET ORAL 4 TIMES DAILY PRN
Qty: 40 TABLET | Refills: 0 | Status: SHIPPED | OUTPATIENT
Start: 2024-01-31

## 2024-01-31 NOTE — PROGRESS NOTES
Subjective:      Patient ID: Joanne Masterson is a 63 y.o. female who presents today for:  Chief Complaint   Patient presents with    Follow-up     Patient presents to clinic for a post-op on her back. She reports she is doing really well at this time.       Subjective/Objective/Assessment/Plan:     SUBJECTIVE -here for follow-up visit after undergoing lumbar spine fusion.  Both her and her  noticed significant improvement in her symptoms however her main complaint is cramping in her hips bilaterally.    OBJECTIVE -incision is well-approximated without signs of infection.  XR LUMBAR SPINE (2-3 VIEWS)  Narrative: EXAMINATION:  XRAY VIEWS OF THE LUMBAR SPINE    12/22/2023 8:00 am    COMPARISON:  September 19th 2023 1122 hours.    HISTORY:  ORDERING SYSTEM PROVIDED HISTORY: standing xray  TECHNOLOGIST PROVIDED HISTORY:  Reason for exam:->standing xray  What reading provider will be dictating this exam?->CRC    FINDINGS:  Three views of the lumbar spine are submitted weight-bearing.    A surgical drain is seen overlying the upper abdomen.  Correlate with patient  medical device history.    Bowel gas is seen in both large small bowel.  A few scattered air-fluid  levels in nondilated small bowel.    Tubal ligation clips are seen overlying the pelvis.  There are now pedicle  screw and edgar spanning the L3, L4 and L5 levels.  There is now been interval  interbody fusion between the L3 no for level.  As well as a prior old  interbody fusion between L4 and L5.    Satisfactory alignment.    No acute fracture.  Impression: STATUS POST INTERVAL REVISION LUMBAR SPINE AS DESCRIBED ABOVE.    A FEW SCATTERED AIR-FLUID LEVELS IN NONDILATED SMALL BOWEL.  MAY REPRESENT OF  POSTOPERATIVE ILEUS.  CORRELATE CLINICALLY.      ASSESSMENT -    Diagnosis Orders   1. Spinal stenosis of lumbar region with neurogenic claudication              PLAN -I will see her back in 6 weeks.  Still no bending lifting or twisting for another 6 weeks.  At

## 2024-02-06 ENCOUNTER — HOSPITAL ENCOUNTER (OUTPATIENT)
Dept: ORTHOPEDIC SURGERY | Age: 64
Discharge: HOME | End: 2024-02-08
Payer: COMMERCIAL

## 2024-02-06 ENCOUNTER — OFFICE VISIT (OUTPATIENT)
Dept: ORTHOPEDIC SURGERY | Age: 64
End: 2024-02-06
Payer: COMMERCIAL

## 2024-02-06 VITALS — WEIGHT: 146 LBS | HEIGHT: 61 IN | BODY MASS INDEX: 27.56 KG/M2

## 2024-02-06 DIAGNOSIS — M77.12 LATERAL EPICONDYLITIS, LEFT ELBOW: ICD-10-CM

## 2024-02-06 DIAGNOSIS — R52 PAIN: ICD-10-CM

## 2024-02-06 DIAGNOSIS — R52 PAIN: Primary | ICD-10-CM

## 2024-02-06 PROCEDURE — 73080 X-RAY EXAM OF ELBOW: CPT

## 2024-02-06 PROCEDURE — 99214 OFFICE O/P EST MOD 30 MIN: CPT | Performed by: ORTHOPAEDIC SURGERY

## 2024-02-06 PROCEDURE — 20551 NJX 1 TENDON ORIGIN/INSJ: CPT | Performed by: ORTHOPAEDIC SURGERY

## 2024-02-06 RX ORDER — LIDOCAINE HYDROCHLORIDE 10 MG/ML
1 INJECTION, SOLUTION INFILTRATION; PERINEURAL ONCE
Status: COMPLETED | OUTPATIENT
Start: 2024-02-06 | End: 2024-02-06

## 2024-02-06 RX ORDER — TRIAMCINOLONE ACETONIDE 40 MG/ML
40 INJECTION, SUSPENSION INTRA-ARTICULAR; INTRAMUSCULAR ONCE
Status: COMPLETED | OUTPATIENT
Start: 2024-02-06 | End: 2024-02-06

## 2024-02-06 RX ORDER — HYDROCODONE BITARTRATE AND ACETAMINOPHEN 5; 325 MG/1; MG/1
1 TABLET ORAL EVERY 4 HOURS PRN
Qty: 30 TABLET | Refills: 0 | Status: SHIPPED | OUTPATIENT
Start: 2024-02-06 | End: 2024-02-13

## 2024-02-06 RX ADMIN — TRIAMCINOLONE ACETONIDE 40 MG: 40 INJECTION, SUSPENSION INTRA-ARTICULAR; INTRAMUSCULAR at 15:59

## 2024-02-06 RX ADMIN — LIDOCAINE HYDROCHLORIDE 1 ML: 10 INJECTION, SOLUTION INFILTRATION; PERINEURAL at 15:58

## 2024-02-06 NOTE — PROGRESS NOTES
Subjective:      Patient ID: Joanne Masterson is a 63 y.o. female who presents today for:  Chief Complaint   Patient presents with    Follow-up     Patient presents to clinic for evaluation of the left elbow. She describes left elbow pain that radiates into the forearm, wrist, and fingers. She states there is numbness/tingling in the fingers, as well as a burning sensation down the forearm. Patient states she has had pain in the left elbow for several years. She has tried cortisone injections, physical therapy exercises, and pain medications without any improvement.       Subjective/Objective/Assessment/Plan:     SUBJECTIVE -the patient is here today with complaints of left elbow pain.  States that she has had tennis elbow for years.  This is affecting her use of her left upper extremity.    OBJECTIVE -pain with resisted wrist extension and long finger extension.  Pain is centered over the common extensor origin of the left elbow.    XR LUMBAR SPINE (2-3 VIEWS)  2 views lumbar spine.  New pedicle screws and new interbody cage at L4-5   are in the same position and alignment as they were at the time of   surgery.      ASSESSMENT -    Diagnosis Orders   1. Pain  XR ELBOW LEFT (MIN 3 VIEWS)      2. Lateral epicondylitis, left elbow            PLAN -the patient wanted an injection into her left elbow.  I explained to her that we can do that today depending on her response we may need an MRI of her left elbow.    The patient has been diagnosed with left elbow lateral epicondylitis. I prepped the injection area with alcohol. Under aseptic technique, I then injected 40mg of Kenalog and 1 cc of 1% lidocaine into the left elbow common extensor origin.. The patient tolerated the procedure well and there were no complications.  I would like to see the patient back in 2 weeks to assess her response to the injection.

## 2024-02-14 DIAGNOSIS — M77.12 LATERAL EPICONDYLITIS, LEFT ELBOW: ICD-10-CM

## 2024-02-14 RX ORDER — HYDROCODONE BITARTRATE AND ACETAMINOPHEN 5; 325 MG/1; MG/1
1 TABLET ORAL EVERY 4 HOURS PRN
Qty: 30 TABLET | Refills: 0 | Status: SHIPPED | OUTPATIENT
Start: 2024-02-14 | End: 2024-02-21

## 2024-02-14 NOTE — TELEPHONE ENCOUNTER
Pt  called asking for a refill on the following medications for Keisha:    Hydrocodone- acetaminophen (NORCO) 5-325 MG Tablet    Preferred Pharmacy is   [email or web address] [address] - [place], [place] - [address] [facility] [phone] - F [phone]  [address] [facility] [facility] [address]  Phone: [phone]  Fax: [phone]

## 2024-02-16 RX ORDER — TIZANIDINE 2 MG/1
2 TABLET ORAL 4 TIMES DAILY PRN
Qty: 40 TABLET | Refills: 0 | OUTPATIENT
Start: 2024-02-16

## 2024-02-21 RX ORDER — TIZANIDINE 2 MG/1
TABLET ORAL
Qty: 40 TABLET | Refills: 0 | Status: SHIPPED | OUTPATIENT
Start: 2024-02-21

## 2024-02-26 ENCOUNTER — OFFICE VISIT (OUTPATIENT)
Dept: ORTHOPEDIC SURGERY | Age: 64
End: 2024-02-26

## 2024-02-26 VITALS — HEIGHT: 61 IN | WEIGHT: 146 LBS | BODY MASS INDEX: 27.56 KG/M2

## 2024-02-26 DIAGNOSIS — M77.12 LATERAL EPICONDYLITIS, LEFT ELBOW: Primary | ICD-10-CM

## 2024-02-26 PROCEDURE — 99024 POSTOP FOLLOW-UP VISIT: CPT | Performed by: ORTHOPAEDIC SURGERY

## 2024-02-26 RX ORDER — HYDROCODONE BITARTRATE AND ACETAMINOPHEN 5; 325 MG/1; MG/1
1 TABLET ORAL EVERY 4 HOURS PRN
Qty: 30 TABLET | Refills: 0 | Status: SHIPPED | OUTPATIENT
Start: 2024-02-26 | End: 2024-03-04

## 2024-02-26 NOTE — PROGRESS NOTES
Take by mouth, Disp: , Rfl:     potassium chloride (KLOR-CON M) 20 MEQ extended release tablet, Take 1 tablet by mouth 2 times daily for 7 days, Disp: 14 tablet, Rfl: 0  --------------------------------------------------------------------------------------------------------------    Lupillo Samaniego DO  Orthopedic and Spine Surgeon  [facility]  [phone]

## 2024-03-04 ENCOUNTER — HOSPITAL ENCOUNTER (OUTPATIENT)
Dept: MRI IMAGING | Age: 64
Discharge: HOME | End: 2024-03-06
Attending: ORTHOPAEDIC SURGERY
Payer: COMMERCIAL

## 2024-03-04 DIAGNOSIS — M77.12 LATERAL EPICONDYLITIS, LEFT ELBOW: ICD-10-CM

## 2024-03-04 PROCEDURE — 73221 MRI JOINT UPR EXTREM W/O DYE: CPT

## 2024-03-06 ENCOUNTER — TELEPHONE (OUTPATIENT)
Dept: ORTHOPEDIC SURGERY | Age: 64
End: 2024-03-06

## 2024-03-06 DIAGNOSIS — M48.062 SPINAL STENOSIS OF LUMBAR REGION WITH NEUROGENIC CLAUDICATION: Primary | ICD-10-CM

## 2024-03-06 NOTE — TELEPHONE ENCOUNTER
Pt  called asking for a refill on the following medications for Keisha:     Hydrocodone- acetaminophen (NORCO) 5-325 MG Tablet     Preferred Pharmacy is   [facility] [address] - [place], [place] - [address] [facility] [phone] - F [phone]  [address] [facility] [facility] [address]  Phone: [phone]  Fax: [phone]

## 2024-03-08 ENCOUNTER — OFFICE VISIT (OUTPATIENT)
Dept: FAMILY MEDICINE CLINIC | Age: 64
End: 2024-03-08
Payer: COMMERCIAL

## 2024-03-08 VITALS
WEIGHT: 144 LBS | TEMPERATURE: 98.3 F | OXYGEN SATURATION: 97 % | BODY MASS INDEX: 27.19 KG/M2 | DIASTOLIC BLOOD PRESSURE: 96 MMHG | SYSTOLIC BLOOD PRESSURE: 156 MMHG | HEIGHT: 61 IN | HEART RATE: 76 BPM

## 2024-03-08 DIAGNOSIS — L03.113 CELLULITIS OF RIGHT ARM: ICD-10-CM

## 2024-03-08 DIAGNOSIS — L30.9 DERMATITIS: Primary | ICD-10-CM

## 2024-03-08 DIAGNOSIS — I10 PRIMARY HYPERTENSION: ICD-10-CM

## 2024-03-08 DIAGNOSIS — J06.9 VIRAL URI: ICD-10-CM

## 2024-03-08 PROCEDURE — 87426 SARSCOV CORONAVIRUS AG IA: CPT | Performed by: NURSE PRACTITIONER

## 2024-03-08 PROCEDURE — 87804 INFLUENZA ASSAY W/OPTIC: CPT | Performed by: NURSE PRACTITIONER

## 2024-03-08 PROCEDURE — 99214 OFFICE O/P EST MOD 30 MIN: CPT | Performed by: NURSE PRACTITIONER

## 2024-03-08 PROCEDURE — 3080F DIAST BP >= 90 MM HG: CPT | Performed by: NURSE PRACTITIONER

## 2024-03-08 PROCEDURE — 3077F SYST BP >= 140 MM HG: CPT | Performed by: NURSE PRACTITIONER

## 2024-03-08 RX ORDER — HYDROCODONE BITARTRATE AND ACETAMINOPHEN 5; 325 MG/1; MG/1
1 TABLET ORAL 2 TIMES DAILY PRN
Qty: 14 TABLET | Refills: 0 | Status: SHIPPED | OUTPATIENT
Start: 2024-03-08 | End: 2024-03-15

## 2024-03-08 RX ORDER — FLUCONAZOLE 150 MG/1
150 TABLET ORAL ONCE
Qty: 1 TABLET | Refills: 0 | Status: SHIPPED | OUTPATIENT
Start: 2024-03-08 | End: 2024-03-08

## 2024-03-08 RX ORDER — LIDOCAINE 30 MG/G
1 CREAM TOPICAL 3 TIMES DAILY PRN
Qty: 28 G | Refills: 0 | Status: SHIPPED | OUTPATIENT
Start: 2024-03-08

## 2024-03-08 RX ORDER — SULFAMETHOXAZOLE AND TRIMETHOPRIM 800; 160 MG/1; MG/1
1 TABLET ORAL 2 TIMES DAILY
Qty: 14 TABLET | Refills: 0 | Status: SHIPPED | OUTPATIENT
Start: 2024-03-08 | End: 2024-03-15

## 2024-03-08 ASSESSMENT — PATIENT HEALTH QUESTIONNAIRE - PHQ9
10. IF YOU CHECKED OFF ANY PROBLEMS, HOW DIFFICULT HAVE THESE PROBLEMS MADE IT FOR YOU TO DO YOUR WORK, TAKE CARE OF THINGS AT HOME, OR GET ALONG WITH OTHER PEOPLE: 0
3. TROUBLE FALLING OR STAYING ASLEEP: 0
2. FEELING DOWN, DEPRESSED OR HOPELESS: 0
SUM OF ALL RESPONSES TO PHQ QUESTIONS 1-9: 0
SUM OF ALL RESPONSES TO PHQ QUESTIONS 1-9: 0
4. FEELING TIRED OR HAVING LITTLE ENERGY: 0
5. POOR APPETITE OR OVEREATING: 0
SUM OF ALL RESPONSES TO PHQ QUESTIONS 1-9: 0
SUM OF ALL RESPONSES TO PHQ QUESTIONS 1-9: 0
6. FEELING BAD ABOUT YOURSELF - OR THAT YOU ARE A FAILURE OR HAVE LET YOURSELF OR YOUR FAMILY DOWN: 0
1. LITTLE INTEREST OR PLEASURE IN DOING THINGS: 0
8. MOVING OR SPEAKING SO SLOWLY THAT OTHER PEOPLE COULD HAVE NOTICED. OR THE OPPOSITE, BEING SO FIGETY OR RESTLESS THAT YOU HAVE BEEN MOVING AROUND A LOT MORE THAN USUAL: 0
7. TROUBLE CONCENTRATING ON THINGS, SUCH AS READING THE NEWSPAPER OR WATCHING TELEVISION: 0
SUM OF ALL RESPONSES TO PHQ9 QUESTIONS 1 & 2: 0
9. THOUGHTS THAT YOU WOULD BE BETTER OFF DEAD, OR OF HURTING YOURSELF: 0

## 2024-03-08 NOTE — PROGRESS NOTES
3/8/2024    Joanne Masterson (:  1960) is a 63 y.o. female, Established patient, here for evaluation of the following chief complaint(s):  Wound Dehiscence (Right arm x 4 mo using oral gel and diaper rash cream , also c/o of nausea vomiting nasal drip/ congestion headache x 1 week )      Vitals:    24 1613   BP: (!) 156/96   Pulse:    Temp:    SpO2:      Results for orders placed or performed in visit on 24   POCT Influenza A/B   Result Value Ref Range    Influenza A Ab neg     Influenza B Ab neg    POCT COVID-19, Antigen   Result Value Ref Range    SARS-COV-2, POC Not-Detected Not Detected    Lot Number [id number]     QC Pass/Fail pass     Performing Instrument BD Veritor          SUBJECTIVE/OBJECTIVE:    Pt comes in with complaints of cold symptoms over past week. She is concerned for ear infection with her ears bothering her but seeming to have gotten better now. She would like covid and flu testing. She also comes in for wound on her right forearm that she was treated with keflex a few months ago. She thinks it started to get better but never went all the way away. The only thing that helps is when she runs her arm under cold water for a long time or if she puts oral gel.         Review of Systems   Constitutional: Negative.  Negative for chills, diaphoresis, fatigue and fever.   HENT:  Positive for congestion, ear pain and rhinorrhea. Negative for ear discharge, sinus pressure and sore throat.    Eyes:  Negative for discharge and redness.   Respiratory: Negative.  Negative for cough, chest tightness, shortness of breath and wheezing.    Cardiovascular:  Negative for chest pain.   Gastrointestinal:  Positive for nausea and vomiting.   Musculoskeletal:  Negative for arthralgias and myalgias.   Skin:  Positive for rash and wound.   Neurological: Negative.  Negative for dizziness, light-headedness and headaches.   Psychiatric/Behavioral: Negative.     All other systems reviewed and are

## 2024-03-09 ASSESSMENT — ENCOUNTER SYMPTOMS
VOMITING: 1
SINUS PRESSURE: 0
EYE REDNESS: 0
SHORTNESS OF BREATH: 0
NAUSEA: 1
EYE DISCHARGE: 0
CHEST TIGHTNESS: 0
SORE THROAT: 0
RESPIRATORY NEGATIVE: 1
COUGH: 0
RHINORRHEA: 1
WHEEZING: 0

## 2024-03-11 ENCOUNTER — OFFICE VISIT (OUTPATIENT)
Dept: ORTHOPEDIC SURGERY | Age: 64
End: 2024-03-11
Payer: COMMERCIAL

## 2024-03-11 DIAGNOSIS — M19.022 OSTEOARTHRITIS OF LEFT ELBOW, UNSPECIFIED OSTEOARTHRITIS TYPE: ICD-10-CM

## 2024-03-11 DIAGNOSIS — M25.522 LEFT ELBOW PAIN: Primary | ICD-10-CM

## 2024-03-11 PROCEDURE — 99213 OFFICE O/P EST LOW 20 MIN: CPT | Performed by: ORTHOPAEDIC SURGERY

## 2024-03-11 RX ORDER — FLUCONAZOLE 150 MG/1
150 TABLET ORAL ONCE
COMMUNITY
Start: 2024-03-08

## 2024-03-11 NOTE — PROGRESS NOTES
Subjective:      Patient ID: Joanne Masterson is a 63 y.o. female who presents today for:  Chief Complaint   Patient presents with    Follow-up     Patient presents to clinic to review left elbow MRI results from 3/4/24.       Subjective/Objective/Assessment/Plan:     SUBJECTIVE -the patient is here today with complaints of left elbow pain.  States that she has had tennis elbow for years.  This is affecting her use of her left upper extremity.    OBJECTIVE -pain with resisted wrist extension and long finger extension.  Pain is centered over the common extensor origin of the left elbow.    MRI ELBOW LEFT WO CONTRAST  Narrative: EXAMINATION:  MRI OF THE LEFT ELBOW WITHOUT CONTRAST, 3/4/2024 4:37 pm    TECHNIQUE:  Multiplanar multisequence MRI of the left elbow was performed without the  administration of intravenous contrast.    COMPARISON:  None.    HISTORY:  ORDERING SYSTEM PROVIDED HISTORY: Lateral epicondylitis, left elbow  TECHNOLOGIST PROVIDED HISTORY:  What reading provider will be dictating this exam?->CRC    FINDINGS:  MEDIAL EPICONDYLE: Normal without bone marrow edema.    LATERAL EPICONDYLE: Normal without bone marrow edema.    MEDIAL COLLATERAL LIGAMENT: Intact ulnar collateral ligament.    LATERAL COLLATERAL LIGAMENT: Intact radial collateral ligament and lateral  ulnar collateral ligament.    MUSCLES / TENDONS: Intact common flexor and extensor tendons. Intact distal  biceps tendon, brachialis and triceps tendons. No significant muscle edema or  atrophy.    ULNAR NERVE: Normal MRI appearance of the ulnar nerve and cubital tunnel.    JOINT SPACES: No significant joint effusion or significant degenerative  disease. Normal alignment.    BONE MARROW: There is moderate subchondral edema in the trochlea of the  distal humerus at its articulation with the ulna.  Heterogeneity of the ulna  is seen.  There is moderate sized osteophytosis.    SOFT TISSUES: No significant soft tissue edema, fluid collections,

## 2024-03-18 ENCOUNTER — OFFICE VISIT (OUTPATIENT)
Dept: ORTHOPEDIC SURGERY | Age: 64
End: 2024-03-18
Payer: COMMERCIAL

## 2024-03-18 DIAGNOSIS — M48.062 SPINAL STENOSIS OF LUMBAR REGION WITH NEUROGENIC CLAUDICATION: Primary | ICD-10-CM

## 2024-03-18 PROCEDURE — 99213 OFFICE O/P EST LOW 20 MIN: CPT | Performed by: ORTHOPAEDIC SURGERY

## 2024-03-18 RX ORDER — TIZANIDINE 2 MG/1
2 TABLET ORAL 4 TIMES DAILY PRN
Qty: 40 TABLET | Refills: 0 | Status: SHIPPED | OUTPATIENT
Start: 2024-03-18

## 2024-03-18 NOTE — PROGRESS NOTES
ibuprofen (ADVIL;MOTRIN) 600 MG tablet, Take 1 tablet by mouth 3 times daily as needed for Pain, Disp: 30 tablet, Rfl: 0    ondansetron (ZOFRAN-ODT) 4 MG disintegrating tablet, Take 1 tablet by mouth every 6 hours as needed for Nausea or Vomiting, Disp: 30 tablet, Rfl: 0    omeprazole (PRILOSEC) 20 MG delayed release capsule, Take 1 capsule by mouth every morning (before breakfast), Disp: 30 capsule, Rfl: 0    lisinopril (PRINIVIL;ZESTRIL) 40 MG tablet, TAKE 1 TABLET BY MOUTH TWICE DAILY (Patient taking differently: Take 1 tablet by mouth daily TAKE 1 TABLET BY MOUTH DAILY), Disp: 180 tablet, Rfl: 0    hydroCHLOROthiazide (HYDRODIURIL) 25 MG tablet, Take 1 tablet by mouth every morning, Disp: 90 tablet, Rfl: 0    MAGNESIUM PO, Take by mouth, Disp: , Rfl:     senna (SENOKOT) 8.6 MG tablet, Take 1 tablet by mouth 2 times daily (Patient not taking: Reported on 3/8/2024), Disp: 60 tablet, Rfl: 1    potassium chloride (KLOR-CON M) 20 MEQ extended release tablet, Take 1 tablet by mouth 2 times daily for 7 days, Disp: 14 tablet, Rfl: 0  --------------------------------------------------------------------------------------------------------------    Lupillo Samaniego DO  Orthopedic and Spine Surgeon  [facility]  [phone]

## 2024-05-30 ENCOUNTER — OFFICE VISIT (OUTPATIENT)
Dept: GASTROENTEROLOGY | Age: 64
End: 2024-05-30
Payer: COMMERCIAL

## 2024-05-30 ENCOUNTER — PREP FOR PROCEDURE (OUTPATIENT)
Dept: GASTROENTEROLOGY | Age: 64
End: 2024-05-30

## 2024-05-30 VITALS
SYSTOLIC BLOOD PRESSURE: 170 MMHG | DIASTOLIC BLOOD PRESSURE: 82 MMHG | HEIGHT: 61 IN | WEIGHT: 134 LBS | BODY MASS INDEX: 25.3 KG/M2 | HEART RATE: 121 BPM | OXYGEN SATURATION: 99 %

## 2024-05-30 DIAGNOSIS — R13.10 ODYNOPHAGIA: Primary | ICD-10-CM

## 2024-05-30 DIAGNOSIS — R13.10 ODYNOPHAGIA: ICD-10-CM

## 2024-05-30 DIAGNOSIS — K59.00 CONSTIPATION, UNSPECIFIED CONSTIPATION TYPE: ICD-10-CM

## 2024-05-30 DIAGNOSIS — D64.9 ANEMIA, UNSPECIFIED TYPE: ICD-10-CM

## 2024-05-30 DIAGNOSIS — R79.89 ABNORMAL LFTS: ICD-10-CM

## 2024-05-30 PROCEDURE — 3079F DIAST BP 80-89 MM HG: CPT | Performed by: SPECIALIST

## 2024-05-30 PROCEDURE — 3077F SYST BP >= 140 MM HG: CPT | Performed by: SPECIALIST

## 2024-05-30 PROCEDURE — 99204 OFFICE O/P NEW MOD 45 MIN: CPT | Performed by: SPECIALIST

## 2024-05-30 RX ORDER — SODIUM, POTASSIUM,MAG SULFATES 17.5-3.13G
SOLUTION, RECONSTITUTED, ORAL ORAL
Qty: 354 ML | Refills: 0 | Status: SHIPPED | OUTPATIENT
Start: 2024-05-30

## 2024-05-30 ASSESSMENT — ENCOUNTER SYMPTOMS
ABDOMINAL PAIN: 1
NAUSEA: 0
EYES NEGATIVE: 1
RESPIRATORY NEGATIVE: 1
BLOOD IN STOOL: 0
BACK PAIN: 1
CONSTIPATION: 1
DIARRHEA: 0
VOMITING: 1
ABDOMINAL DISTENTION: 0
ANAL BLEEDING: 0
RECTAL PAIN: 0

## 2024-05-30 NOTE — PROGRESS NOTES
Gastroenterology Clinic Visit    Joanne Masterson  [id number]  Chief Complaint   Patient presents with    New Patient    Nausea    Abdominal Pain     Fatty liver, S/P LAPAROSCOPIC CHOLECYSTECTOMY        HPI: 63 y.o. female presents to the clinic with history of epigastric and lower chest pain since last 2 years and symptoms has been gradually getting worse, patient experience odynophagia with solids and liquid and has been having intermittent emesis and had seen ingested food particle at times, no hematemesis, patient describes also having spasm-like discomfort in the chest area.  Pain radiates to the back.  Patient was taking NSAIDs and acetaminophen which she discontinued about 2 to 3 weeks ago after she found out she had abnormal liver enzymes.  LFT from 8/10/2024 showed AST 46 ALT 95 normal alk phos bilirubin and normal albumin.  Also has constipation dating back to several months and has been taking laxative, patient used to drink alcohol in the past but has not been using for the last many years.  Patient was taking Prilosec which she discontinued about 2 weeks ago. . History of intentional weight loss.  Social,  history does not smoke does not drink alcohol, family history mother and girls have ulcerative colitis.    Review of Systems   Constitutional: Negative.    HENT: Negative.     Eyes: Negative.    Respiratory: Negative.     Cardiovascular: Negative.         Hypertension   Gastrointestinal:  Positive for abdominal pain, constipation and vomiting. Negative for abdominal distention, anal bleeding, blood in stool, diarrhea, nausea and rectal pain.        Odynophagia   Endocrine: Negative.    Genitourinary: Negative.    Musculoskeletal:  Positive for back pain.   Skin: Negative.    Allergic/Immunologic: Positive for food allergies.   Neurological: Negative.    Hematological: Negative.    Psychiatric/Behavioral: Negative.          Past Medical History:   Diagnosis Date    Alcoholic fatty liver 12/10/2014    By

## 2024-05-31 RX ORDER — SODIUM CHLORIDE 9 MG/ML
INJECTION, SOLUTION INTRAVENOUS PRN
Status: CANCELLED | OUTPATIENT
Start: 2024-05-31

## 2024-05-31 RX ORDER — SODIUM CHLORIDE 0.9 % (FLUSH) 0.9 %
5-40 SYRINGE (ML) INJECTION EVERY 12 HOURS SCHEDULED
Status: CANCELLED | OUTPATIENT
Start: 2024-05-31

## 2024-05-31 RX ORDER — SODIUM CHLORIDE 0.9 % (FLUSH) 0.9 %
5-40 SYRINGE (ML) INJECTION PRN
Status: CANCELLED | OUTPATIENT
Start: 2024-05-31

## 2024-05-31 RX ORDER — SODIUM CHLORIDE 9 MG/ML
INJECTION, SOLUTION INTRAVENOUS CONTINUOUS
Status: CANCELLED | OUTPATIENT
Start: 2024-05-31

## 2024-06-02 ENCOUNTER — HOSPITAL ENCOUNTER (EMERGENCY)
Age: 64
Discharge: HOME | End: 2024-06-02
Attending: FAMILY MEDICINE
Payer: COMMERCIAL

## 2024-06-02 ENCOUNTER — APPOINTMENT (OUTPATIENT)
Dept: CT IMAGING | Age: 64
End: 2024-06-02
Payer: COMMERCIAL

## 2024-06-02 VITALS
TEMPERATURE: 98.6 F | WEIGHT: 133 LBS | RESPIRATION RATE: 16 BRPM | HEART RATE: 99 BPM | BODY MASS INDEX: 25.11 KG/M2 | SYSTOLIC BLOOD PRESSURE: 139 MMHG | DIASTOLIC BLOOD PRESSURE: 81 MMHG | OXYGEN SATURATION: 100 % | HEIGHT: 61 IN

## 2024-06-02 DIAGNOSIS — R10.13 ABDOMINAL PAIN, EPIGASTRIC: Primary | ICD-10-CM

## 2024-06-02 LAB
ABO + RH BLD: NORMAL
ALP SERPL-CCNC: 113 U/L (ref 40–130)
ALT SERPL-CCNC: 27 U/L (ref 0–33)
AST SERPL-CCNC: 40 U/L (ref 0–35)
BASOPHILS # BLD: 0 K/UL (ref 0–0.2)
BASOPHILS NFR BLD: 0.7 %
BILIRUB SERPL-MCNC: <0.2 MG/DL (ref 0.2–0.7)
BILIRUB UR QL STRIP: NEGATIVE
BLD GP AB SCN SERPL QL: NORMAL
BUN SERPL-MCNC: 7 MG/DL (ref 8–23)
CALCIUM SERPL-MCNC: 9.5 MG/DL (ref 8.5–9.9)
CHLORIDE SERPL-SCNC: 103 MEQ/L (ref 95–107)
CLARITY UR: CLEAR
CO2 SERPL-SCNC: 22 MEQ/L (ref 20–31)
COLOR UR: YELLOW
CREAT SERPL-MCNC: 0.63 MG/DL (ref 0.5–0.9)
EOSINOPHIL # BLD: 0.1 K/UL (ref 0–0.7)
EOSINOPHIL NFR BLD: 2.1 %
GLOBULIN SER CALC-MCNC: 3.2 G/DL (ref 2.3–3.5)
GLUCOSE SERPL-MCNC: 119 MG/DL (ref 70–99)
GLUCOSE UR STRIP-MCNC: NEGATIVE MG/DL
HCT VFR BLD AUTO: 43.2 % (ref 37–47)
HGB BLD-MCNC: 14.3 G/DL (ref 12–16)
HGB UR QL STRIP: NEGATIVE
INR PPP: 0.9
KETONES UR STRIP-MCNC: NEGATIVE MG/DL
LACTIC ACID, SEPSIS: 1.8 MMOL/L (ref 0.5–1.9)
LACTIC ACID, SEPSIS: 1.8 MMOL/L (ref 0.5–1.9)
LEUKOCYTE ESTERASE UR QL STRIP: NEGATIVE
LIPASE SERPL-CCNC: 36 U/L (ref 12–95)
LYMPHOCYTES NFR BLD: 24.1 %
MCHC RBC AUTO-ENTMCNC: 33.1 % (ref 33–37)
MCV RBC AUTO: 98.4 FL (ref 79.4–94.8)
MONOCYTES # BLD: 0.4 K/UL (ref 0.2–0.8)
MONOCYTES NFR BLD: 6.1 %
NEUTROPHILS # BLD: 4.1 K/UL (ref 1.4–6.5)
NEUTS SEG NFR BLD: 66.8 %
NITRITE UR QL STRIP: NEGATIVE
PH UR STRIP: 7 [PH] (ref 5–9)
PLATELET # BLD AUTO: 348 K/UL (ref 130–400)
POTASSIUM SERPL-SCNC: 5.1 MEQ/L (ref 3.4–4.9)
PROT SERPL-MCNC: 7.3 G/DL (ref 6.3–8)
PROT UR STRIP-MCNC: NEGATIVE MG/DL
PROTHROMBIN TIME: 12.4 SEC (ref 12.3–14.9)
RBC # BLD AUTO: 4.39 M/UL (ref 4.2–5.4)
SODIUM SERPL-SCNC: 139 MEQ/L (ref 135–144)
SP GR UR STRIP: 1.01 (ref 1–1.03)
URINE REFLEX TO CULTURE: NORMAL
UROBILINOGEN UR STRIP-ACNC: 0.2 E.U./DL
WBC # BLD AUTO: 6.1 K/UL (ref 4.8–10.8)

## 2024-06-02 PROCEDURE — 81003 URINALYSIS AUTO W/O SCOPE: CPT

## 2024-06-02 PROCEDURE — 86900 BLOOD TYPING SEROLOGIC ABO: CPT

## 2024-06-02 PROCEDURE — 80053 COMPREHEN METABOLIC PANEL: CPT

## 2024-06-02 PROCEDURE — 85610 PROTHROMBIN TIME: CPT

## 2024-06-02 PROCEDURE — 85025 COMPLETE CBC W/AUTO DIFF WBC: CPT

## 2024-06-02 PROCEDURE — 96374 THER/PROPH/DIAG INJ IV PUSH: CPT

## 2024-06-02 PROCEDURE — 86901 BLOOD TYPING SEROLOGIC RH(D): CPT

## 2024-06-02 PROCEDURE — 83605 ASSAY OF LACTIC ACID: CPT

## 2024-06-02 PROCEDURE — 6360000002 HC RX W HCPCS: Performed by: FAMILY MEDICINE

## 2024-06-02 PROCEDURE — 99284 EMERGENCY DEPT VISIT MOD MDM: CPT

## 2024-06-02 PROCEDURE — 2580000003 HC RX 258: Performed by: FAMILY MEDICINE

## 2024-06-02 PROCEDURE — 86850 RBC ANTIBODY SCREEN: CPT

## 2024-06-02 PROCEDURE — 36415 COLL VENOUS BLD VENIPUNCTURE: CPT

## 2024-06-02 PROCEDURE — 74176 CT ABD & PELVIS W/O CONTRAST: CPT

## 2024-06-02 PROCEDURE — 83690 ASSAY OF LIPASE: CPT

## 2024-06-02 PROCEDURE — 96375 TX/PRO/DX INJ NEW DRUG ADDON: CPT

## 2024-06-02 RX ORDER — MORPHINE SULFATE 4 MG/ML
4 INJECTION, SOLUTION INTRAMUSCULAR; INTRAVENOUS ONCE
Status: COMPLETED | OUTPATIENT
Start: 2024-06-02 | End: 2024-06-02

## 2024-06-02 RX ORDER — 0.9 % SODIUM CHLORIDE 0.9 %
1000 INTRAVENOUS SOLUTION INTRAVENOUS ONCE
Status: COMPLETED | OUTPATIENT
Start: 2024-06-02 | End: 2024-06-02

## 2024-06-02 RX ORDER — SUCRALFATE 1 G/1
1 TABLET ORAL 4 TIMES DAILY
Qty: 40 TABLET | Refills: 0 | Status: SHIPPED | OUTPATIENT
Start: 2024-06-02 | End: 2024-06-12

## 2024-06-02 RX ORDER — ONDANSETRON 2 MG/ML
4 INJECTION INTRAMUSCULAR; INTRAVENOUS ONCE
Status: COMPLETED | OUTPATIENT
Start: 2024-06-02 | End: 2024-06-02

## 2024-06-02 RX ADMIN — ONDANSETRON 4 MG: 2 INJECTION INTRAMUSCULAR; INTRAVENOUS at 10:55

## 2024-06-02 RX ADMIN — SODIUM CHLORIDE 1000 ML: 9 INJECTION, SOLUTION INTRAVENOUS at 10:55

## 2024-06-02 RX ADMIN — MORPHINE SULFATE 4 MG: 4 INJECTION, SOLUTION INTRAMUSCULAR; INTRAVENOUS at 10:56

## 2024-06-02 ASSESSMENT — PAIN DESCRIPTION - LOCATION
LOCATION: ABDOMEN;BACK
LOCATION: ABDOMEN

## 2024-06-02 ASSESSMENT — PAIN DESCRIPTION - DESCRIPTORS: DESCRIPTORS: BURNING;CRAMPING

## 2024-06-02 ASSESSMENT — PAIN SCALES - GENERAL
PAINLEVEL_OUTOF10: 8
PAINLEVEL_OUTOF10: 8

## 2024-06-02 ASSESSMENT — PAIN DESCRIPTION - ORIENTATION: ORIENTATION: MID

## 2024-06-02 ASSESSMENT — PAIN - FUNCTIONAL ASSESSMENT: PAIN_FUNCTIONAL_ASSESSMENT: NONE - DENIES PAIN

## 2024-06-02 NOTE — ED TRIAGE NOTES
Pt a&ox4, skin w/d/pink, c/o mid upper abd pain 8/10 for a while, pt describes pain is going from abd to mid back, pain is burning and cramping in nature, pt states she is in so much pain that it makes her depressed, pt c/o nausea and diarrhea, pt states she saw Gastro  and has scope scheduled soon, 0 bleeding reported. 0 emesis at this time,  Pt has steady gait, hx. Back sx, weed/CBD use.   Spoke with patient-she wanted to know if she needs to be fasting for her labs that she is having tomorrow and she does not

## 2024-06-02 NOTE — ED NOTES
Pt discharge at this time. Pt states understanding of medications, and is educated on reaction to monitor for.  Pt educated follow up with PCP as needed/as directed. Pt states understanding of returning to ER if needed for worsening symptoms. Pt ambulates with slow steady gait at discharge. Pt is alert and oriented times 4 at this times. Pt taken to car by staff at this time. Pt left with family at this time. Pt denies any increase of symptoms at this time.

## 2024-06-02 NOTE — ED PROVIDER NOTES
[facility]  eMERGENCY dEPARTMENT eNCOUnter      Pt Name: Joanne Masterson  MRN: [id number]  Birthdate 1960  Date of evaluation: 6/2/2024  Provider: DAVID SAHA MD  10:38 AM EDT     CHIEF COMPLAINT       Chief Complaint   Patient presents with    Abdominal Pain     Pt c/o mid upper abd pain for a year, getting worse         HISTORY OF PRESENT ILLNESS   (Location/Symptom, Timing/Onset,Context/Setting, Quality, Duration, Modifying Factors, Severity)  Note limiting factors.   Joanne Masterson is a 63 y.o. female who presents to the emergency department for abdominal pain      The history is provided by the patient.   Abdominal Pain  Pain location:  Epigastric  Pain quality: aching    Pain radiates to:  Does not radiate  Pain severity:  Moderate  Onset quality:  Gradual  Duration:  2 days  Timing:  Constant  Progression:  Unchanged  Chronicity:  New  Context: eating    Context: not alcohol use, not awakening from sleep, not diet changes, not laxative use, not medication withdrawal, not recent illness, not recent sexual activity, not recent travel, not retching, not sick contacts, not suspicious food intake and not trauma    Relieved by:  Nothing  Worsened by:  Nothing  Ineffective treatments:  None tried  Associated symptoms: nausea        NursingNotes were reviewed.    REVIEW OF SYSTEMS    (2-9 systems for level 4, 10 or more for level 5)     Review of Systems   Constitutional: Negative.    HENT: Negative.     Eyes: Negative.    Respiratory: Negative.     Gastrointestinal:  Positive for abdominal pain and nausea.   Endocrine: Negative.    Genitourinary: Negative.    Musculoskeletal: Negative.    Allergic/Immunologic: Negative.    Neurological: Negative.    Hematological: Negative.    Psychiatric/Behavioral: Negative.         Except as noted above the remainder of the review of systems was reviewed and negative.       PAST MEDICAL HISTORY     Past Medical History:   Diagnosis Date    Alcoholic fatty

## 2024-06-03 ENCOUNTER — TELEPHONE (OUTPATIENT)
Dept: GASTROENTEROLOGY | Age: 64
End: 2024-06-03

## 2024-06-03 LAB
PERFORMED ON: NORMAL
PERFORMED ON: NORMAL
POC CREATININE: 0.7 MG/DL (ref 0.6–1.2)
POC SAMPLE TYPE: NORMAL
POC SAMPLE TYPE: NORMAL

## 2024-06-03 NOTE — TELEPHONE ENCOUNTER
The patient was sen in the ER yesterday and scheduled for an US 6/4/24 and a Colon/Egd on 6/14/24. Would you like the patient to still come in the office before her procedure.

## 2024-06-03 NOTE — TELEPHONE ENCOUNTER
----- Message from Amaya Zavaleta MD sent at 6/3/2024  7:24 AM EDT -----  Please schedule an office visit  ----- Message -----  From: Robe Prakash MD  Sent: 6/3/2024   1:51 AM EDT  To: Amaya Zavaleta MD

## 2024-06-05 ENCOUNTER — ANESTHESIA EVENT (OUTPATIENT)
Dept: ENDOSCOPY | Age: 64
End: 2024-06-05
Payer: COMMERCIAL

## 2024-06-05 ENCOUNTER — TELEPHONE (OUTPATIENT)
Dept: GASTROENTEROLOGY | Age: 64
End: 2024-06-05

## 2024-06-05 ENCOUNTER — HOSPITAL ENCOUNTER (OUTPATIENT)
Age: 64
Setting detail: OUTPATIENT SURGERY
Discharge: HOME | End: 2024-06-05
Attending: SPECIALIST | Admitting: SPECIALIST
Payer: COMMERCIAL

## 2024-06-05 ENCOUNTER — ANESTHESIA (OUTPATIENT)
Dept: ENDOSCOPY | Age: 64
End: 2024-06-05
Payer: COMMERCIAL

## 2024-06-05 VITALS
HEART RATE: 79 BPM | HEIGHT: 61 IN | SYSTOLIC BLOOD PRESSURE: 138 MMHG | WEIGHT: 130 LBS | BODY MASS INDEX: 24.55 KG/M2 | RESPIRATION RATE: 16 BRPM | TEMPERATURE: 98.2 F | OXYGEN SATURATION: 98 % | DIASTOLIC BLOOD PRESSURE: 88 MMHG

## 2024-06-05 DIAGNOSIS — K59.00 CONSTIPATION, UNSPECIFIED CONSTIPATION TYPE: ICD-10-CM

## 2024-06-05 DIAGNOSIS — D64.9 ANEMIA, UNSPECIFIED TYPE: ICD-10-CM

## 2024-06-05 DIAGNOSIS — R13.10 ODYNOPHAGIA: ICD-10-CM

## 2024-06-05 PROBLEM — D12.5 ADENOMATOUS POLYP OF SIGMOID COLON: Status: ACTIVE | Noted: 2024-06-05

## 2024-06-05 PROCEDURE — 2709999900 HC NON-CHARGEABLE SUPPLY: Performed by: SPECIALIST

## 2024-06-05 PROCEDURE — 43239 EGD BIOPSY SINGLE/MULTIPLE: CPT | Performed by: SPECIALIST

## 2024-06-05 PROCEDURE — 3700000001 HC ADD 15 MINUTES (ANESTHESIA): Performed by: SPECIALIST

## 2024-06-05 PROCEDURE — 3609017100 HC EGD: Performed by: SPECIALIST

## 2024-06-05 PROCEDURE — 88305 TISSUE EXAM BY PATHOLOGIST: CPT

## 2024-06-05 PROCEDURE — 7100000010 HC PHASE II RECOVERY - FIRST 15 MIN: Performed by: SPECIALIST

## 2024-06-05 PROCEDURE — 2580000003 HC RX 258

## 2024-06-05 PROCEDURE — 3609027000 HC COLONOSCOPY: Performed by: SPECIALIST

## 2024-06-05 PROCEDURE — 7100000011 HC PHASE II RECOVERY - ADDTL 15 MIN: Performed by: SPECIALIST

## 2024-06-05 PROCEDURE — 3700000000 HC ANESTHESIA ATTENDED CARE: Performed by: SPECIALIST

## 2024-06-05 PROCEDURE — 2500000003 HC RX 250 WO HCPCS: Performed by: NURSE ANESTHETIST, CERTIFIED REGISTERED

## 2024-06-05 PROCEDURE — 6360000002 HC RX W HCPCS: Performed by: NURSE ANESTHETIST, CERTIFIED REGISTERED

## 2024-06-05 PROCEDURE — 45385 COLONOSCOPY W/LESION REMOVAL: CPT | Performed by: SPECIALIST

## 2024-06-05 PROCEDURE — 2580000003 HC RX 258: Performed by: SPECIALIST

## 2024-06-05 RX ORDER — SODIUM CHLORIDE 0.9 % (FLUSH) 0.9 %
5-40 SYRINGE (ML) INJECTION EVERY 12 HOURS SCHEDULED
Status: DISCONTINUED | OUTPATIENT
Start: 2024-06-05 | End: 2024-06-05 | Stop reason: HOSPADM

## 2024-06-05 RX ORDER — SODIUM CHLORIDE 9 MG/ML
INJECTION, SOLUTION INTRAVENOUS CONTINUOUS
Status: DISCONTINUED | OUTPATIENT
Start: 2024-06-05 | End: 2024-06-05 | Stop reason: HOSPADM

## 2024-06-05 RX ORDER — SODIUM CHLORIDE 0.9 % (FLUSH) 0.9 %
5-40 SYRINGE (ML) INJECTION PRN
Status: DISCONTINUED | OUTPATIENT
Start: 2024-06-05 | End: 2024-06-05 | Stop reason: HOSPADM

## 2024-06-05 RX ORDER — SODIUM CHLORIDE 9 MG/ML
INJECTION, SOLUTION INTRAVENOUS PRN
Status: DISCONTINUED | OUTPATIENT
Start: 2024-06-05 | End: 2024-06-05 | Stop reason: HOSPADM

## 2024-06-05 RX ORDER — LIDOCAINE HYDROCHLORIDE 20 MG/ML
INJECTION, SOLUTION INFILTRATION; PERINEURAL PRN
Status: DISCONTINUED | OUTPATIENT
Start: 2024-06-05 | End: 2024-06-05 | Stop reason: SDUPTHER

## 2024-06-05 RX ORDER — PROPOFOL 10 MG/ML
INJECTION, EMULSION INTRAVENOUS PRN
Status: DISCONTINUED | OUTPATIENT
Start: 2024-06-05 | End: 2024-06-05 | Stop reason: SDUPTHER

## 2024-06-05 RX ORDER — SODIUM CHLORIDE 9 MG/ML
INJECTION, SOLUTION INTRAVENOUS
Status: COMPLETED
Start: 2024-06-05 | End: 2024-06-05

## 2024-06-05 RX ADMIN — PROPOFOL 50 MG: 10 INJECTION, EMULSION INTRAVENOUS at 11:45

## 2024-06-05 RX ADMIN — PROPOFOL 50 MG: 10 INJECTION, EMULSION INTRAVENOUS at 11:29

## 2024-06-05 RX ADMIN — PROPOFOL 50 MG: 10 INJECTION, EMULSION INTRAVENOUS at 11:21

## 2024-06-05 RX ADMIN — LIDOCAINE HYDROCHLORIDE 60 MG: 20 INJECTION, SOLUTION INFILTRATION; PERINEURAL at 11:17

## 2024-06-05 RX ADMIN — PROPOFOL 50 MG: 10 INJECTION, EMULSION INTRAVENOUS at 11:41

## 2024-06-05 RX ADMIN — PROPOFOL 170 MG: 10 INJECTION, EMULSION INTRAVENOUS at 11:17

## 2024-06-05 RX ADMIN — PROPOFOL 50 MG: 10 INJECTION, EMULSION INTRAVENOUS at 11:33

## 2024-06-05 RX ADMIN — PROPOFOL 50 MG: 10 INJECTION, EMULSION INTRAVENOUS at 11:37

## 2024-06-05 RX ADMIN — SODIUM CHLORIDE 500 ML: 9 INJECTION, SOLUTION INTRAVENOUS at 11:09

## 2024-06-05 RX ADMIN — PROPOFOL 50 MG: 10 INJECTION, EMULSION INTRAVENOUS at 11:25

## 2024-06-05 ASSESSMENT — PAIN - FUNCTIONAL ASSESSMENT: PAIN_FUNCTIONAL_ASSESSMENT: 0-10

## 2024-06-05 NOTE — ANESTHESIA POSTPROCEDURE EVALUATION
Department of Anesthesiology  Postprocedure Note    Patient: Joanne Masterson  MRN: [id number]  YOB: 1960  Date of evaluation: 6/5/2024    Procedure Summary       Date: 06/05/24 [facility] OR 01 [facility]    Anesthesia Start: 1115 Anesthesia Stop: 1152    Procedures:       COLONOSCOPY WITH POLYPECTOMY      ESOPHAGOGASTRODUODENOSCOPY WITH BIOPSIES Diagnosis:       Anemia, unspecified type      Constipation, unspecified constipation type      Odynophagia      (Anemia, unspecified type [D64.9])      (Constipation, unspecified constipation type [K59.00])      (Odynophagia [R13.10])    Surgeons: Amaya Zavaleta MD Responsible Provider: Willow Walter APRN - CRNA    Anesthesia Type: MAC ASA Status: 2            Anesthesia Type: No value filed.    Nancy Phase I: Nancy Score: 10    Nancy Phase II:      Anesthesia Post Evaluation    Patient location during evaluation: PACU  Level of consciousness: awake  Pain score: 0  Airway patency: patent  Nausea & Vomiting: no vomiting and no nausea  Cardiovascular status: hemodynamically stable  Respiratory status: acceptable  Hydration status: stable  Pain management: adequate and satisfactory to patient        No notable events documented.

## 2024-06-05 NOTE — TELEPHONE ENCOUNTER
Patient called asking if you can send something to her pharmacy for nausea and heartburn. [facility] [place]

## 2024-06-05 NOTE — ANESTHESIA PRE PROCEDURE
intravenous.      Anesthetic plan and risks discussed with patient.      Plan discussed with surgical team.                    LOS Monteiro - JERAMIE   6/5/2024

## 2024-06-05 NOTE — H&P
Patient Name: Joanne Masterson  : 1960  MRN: [id number]  DATE: 24      ENDOSCOPY  History and Physical    Procedure:    [x] Diagnostic Colonoscopy       [] Screening Colonoscopy  [x] EGD      [] ERCP      [] EUS       [] Other    [x] Previous office notes/History and Physical reviewed from the patients chart. Please see EMR for further details of HPI. I have examined the patient's status immediately prior to the procedure and:      Indications/HPI:    [x]Abdominal Pain  []Cancer- GI/Lung  []Fhx of colon CA/polyps  []History of Polyps  []Ogden’s   []Melena  []Abnormal Imaging  []Dysphagia    []Persistent Pneumonia  []Anemia  []Food Impaction  []History of Polyps  []GI Bleed  []Pulmonary nodule/Mass  []Change in bowel habits []Heartburn/Reflux  []Rectal Bleed (BRBPR)  []Chest Pain - Non Cardiac []Heme (+) Stoo  l[]Ulcers  [x]Constipation  []Hemoptysis   []Varices  []Diarrhea  []Hypoxemia  []Nausea/Vomiting  []Screening   []Crohns/Colitis  []Other: Odynophagia and emesis    Anesthesia:   [x] MAC [] Moderate Sedation   [] General   [] None     ROS: 12 pt Review of Symptoms was negative unless mentioned above    Medications:   Prior to Admission medications    Medication Sig Start Date End Date Taking? Authorizing Provider   sucralfate (CARAFATE) 1 GM tablet Take 1 tablet by mouth 4 times daily for 10 days  Patient not taking: Reported on 2024  Robe Prakash MD   sodium-potassium-mag sulfate (SUPREP) 17.5-3.13-1.6 GM/177ML SOLN solution As directed 24   Amaya Zavaleta MD   tiZANidine (ZANAFLEX) 2 MG tablet Take 1 tablet by mouth 4 times daily as needed (spasm)  Patient not taking: Reported on 2024 3/18/24   Lupillo Samaniego DO   fluconazole (DIFLUCAN) 150 MG tablet Take 1 tablet by mouth once  Patient not taking: Reported on 2024 3/8/24   ProviderAmish MD   mupirocin (BACTROBAN) 2 % ointment Apply 1 g topically 3 times daily Apply topically 3 times daily.  Patient not

## 2024-06-07 ENCOUNTER — TELEPHONE (OUTPATIENT)
Dept: GASTROENTEROLOGY | Age: 64
End: 2024-06-07

## 2024-06-07 NOTE — TELEPHONE ENCOUNTER
Patient called to see if she can get a probiotic sent to pharmacy, says her otc isn't working well

## 2024-06-19 RX ORDER — TIZANIDINE 4 MG/1
4 TABLET ORAL 2 TIMES DAILY PRN
Qty: 40 TABLET | Refills: 1 | Status: SHIPPED | OUTPATIENT
Start: 2024-06-19

## 2024-06-25 ENCOUNTER — TRANSCRIBE ORDERS (OUTPATIENT)
Dept: ADMINISTRATIVE | Age: 64
End: 2024-06-25

## 2024-06-25 DIAGNOSIS — R94.5 ABNORMAL RESULTS OF LIVER FUNCTION STUDIES: Primary | ICD-10-CM

## 2024-06-25 DIAGNOSIS — M48.062 SPINAL STENOSIS OF LUMBAR REGION WITH NEUROGENIC CLAUDICATION: ICD-10-CM

## 2024-06-25 DIAGNOSIS — R10.10 UPPER ABDOMINAL PAIN: ICD-10-CM

## 2024-06-25 RX ORDER — HYDROCODONE BITARTRATE AND ACETAMINOPHEN 5; 325 MG/1; MG/1
1 TABLET ORAL 2 TIMES DAILY PRN
Qty: 14 TABLET | Refills: 0 | Status: SHIPPED | OUTPATIENT
Start: 2024-06-25 | End: 2024-07-02

## 2024-07-01 ENCOUNTER — TELEPHONE (OUTPATIENT)
Dept: GASTROENTEROLOGY | Age: 64
End: 2024-07-01

## 2024-07-01 NOTE — TELEPHONE ENCOUNTER
Patient called stating she is having bad pain in her esophagus and is having abdominal pain . Patient would like you to kady her [phone]

## 2024-07-03 ENCOUNTER — HOSPITAL ENCOUNTER (OUTPATIENT)
Dept: MRI IMAGING | Age: 64
Discharge: HOME | End: 2024-07-05
Payer: COMMERCIAL

## 2024-07-03 DIAGNOSIS — R10.10 UPPER ABDOMINAL PAIN: ICD-10-CM

## 2024-07-03 DIAGNOSIS — R94.5 ABNORMAL RESULTS OF LIVER FUNCTION STUDIES: ICD-10-CM

## 2024-07-03 PROCEDURE — A9577 INJ MULTIHANCE: HCPCS | Performed by: FAMILY MEDICINE

## 2024-07-03 PROCEDURE — 74183 MRI ABD W/O CNTR FLWD CNTR: CPT

## 2024-07-03 PROCEDURE — 6360000004 HC RX CONTRAST MEDICATION: Performed by: FAMILY MEDICINE

## 2024-07-03 RX ADMIN — GADOBENATE DIMEGLUMINE 20 ML: 529 INJECTION, SOLUTION INTRAVENOUS at 08:17

## 2024-07-17 ENCOUNTER — OFFICE VISIT (OUTPATIENT)
Dept: GASTROENTEROLOGY | Age: 64
End: 2024-07-17
Payer: COMMERCIAL

## 2024-07-17 VITALS — HEART RATE: 111 BPM | WEIGHT: 124 LBS | OXYGEN SATURATION: 99 % | BODY MASS INDEX: 23.41 KG/M2 | HEIGHT: 61 IN

## 2024-07-17 DIAGNOSIS — R10.13 EPIGASTRIC PAIN: ICD-10-CM

## 2024-07-17 DIAGNOSIS — R10.13 EPIGASTRIC PAIN: Primary | ICD-10-CM

## 2024-07-17 DIAGNOSIS — R79.89 ABNORMAL LFTS: ICD-10-CM

## 2024-07-17 LAB
ALBUMIN SERPL-MCNC: 4.7 G/DL (ref 3.5–4.6)
ALP SERPL-CCNC: 112 U/L (ref 40–130)
ALT SERPL-CCNC: 283 U/L (ref 0–33)
AST SERPL-CCNC: 103 U/L (ref 0–35)
BILIRUB DIRECT SERPL-MCNC: <0.2 MG/DL (ref 0–0.4)
BILIRUB INDIRECT SERPL-MCNC: ABNORMAL MG/DL (ref 0–0.6)
BILIRUB SERPL-MCNC: 0.4 MG/DL (ref 0.2–0.7)
HBV SURFACE AG SERPL QL IA: NORMAL
LIPASE SERPL-CCNC: 36 U/L (ref 12–95)
PROT SERPL-MCNC: 7.5 G/DL (ref 6.3–8)

## 2024-07-17 PROCEDURE — 99213 OFFICE O/P EST LOW 20 MIN: CPT | Performed by: SPECIALIST

## 2024-07-17 ASSESSMENT — ENCOUNTER SYMPTOMS
ABDOMINAL PAIN: 1
RESPIRATORY NEGATIVE: 1
RECTAL PAIN: 0
NAUSEA: 1
CONSTIPATION: 0
ABDOMINAL DISTENTION: 0
VOMITING: 1
BLOOD IN STOOL: 0
DIARRHEA: 1
BACK PAIN: 1
ANAL BLEEDING: 0
EYES NEGATIVE: 1

## 2024-07-17 NOTE — PROGRESS NOTES
Gastroenterology Clinic Follow up Visit    Joanne Masterson  [id number]  Chief Complaint   Patient presents with    Abdominal Pain     PANCREAS ISSUES.        HPI and A/P at last visit summarized below:  Patient is here for follow-up.,  Had EGD and colonoscopy.,  EGD showed 2 cm sliding hiatal hernia and because the history of dysphagia esophageal biopsies were taken which did not show any evidence of eosinophilic esophagitis, colonoscopy showed diverticulosis and a single diverticulum in the ileum and 5 mm polyp in the sigmoid colon and hemorrhoid, sigmoid polyp was a tubular adenoma, was seen in the emergency room because of abdominal pain nausea vomiting and had a CT of the abdomen pelvis done which did not show any significant abnormality other than mild biliary ductal dilation probably related to previous cholecystectomy, LFTs showed AST of 40 otherwise normal liver enzyme, had MRI of the abdomen with and without contrast on July third of 2024 which showed chronic pattern of biliary dilation with no evidence of any stricture or choledocholithiasis probably related to remote cholecystectomy and no other abnormalities were seen.  Patient reports having sharp epigastric pain radiating to the back associated with retrosternal burning sensation and vomiting also having diarrhea which appears dark brown, patient had tried multiple PPIs without much success, also reports having diaphoresis.  No fever    Review of Systems   Constitutional: Negative.    HENT: Negative.     Eyes: Negative.    Respiratory: Negative.     Cardiovascular: Negative.    Gastrointestinal:  Positive for abdominal pain, diarrhea, nausea and vomiting. Negative for abdominal distention, anal bleeding, blood in stool, constipation and rectal pain.   Endocrine: Negative.    Genitourinary: Negative.    Musculoskeletal:  Positive for back pain.   Skin: Negative.    Allergic/Immunologic: Negative for food allergies.   Neurological: Negative.

## 2024-07-18 ENCOUNTER — PREP FOR PROCEDURE (OUTPATIENT)
Dept: GASTROENTEROLOGY | Age: 64
End: 2024-07-18

## 2024-07-18 DIAGNOSIS — R79.89 ABNORMAL LFTS: ICD-10-CM

## 2024-07-18 LAB — HEPATITIS C ANTIBODY: NONREACTIVE

## 2024-07-18 RX ORDER — SODIUM CHLORIDE 9 MG/ML
INJECTION, SOLUTION INTRAVENOUS PRN
Status: CANCELLED | OUTPATIENT
Start: 2024-07-18

## 2024-07-18 RX ORDER — SODIUM CHLORIDE 0.9 % (FLUSH) 0.9 %
5-40 SYRINGE (ML) INJECTION EVERY 12 HOURS SCHEDULED
Status: CANCELLED | OUTPATIENT
Start: 2024-07-18

## 2024-07-18 RX ORDER — INDOMETHACIN 100 MG
100 SUPPOSITORY, RECTAL RECTAL ONCE
Status: CANCELLED | OUTPATIENT
Start: 2024-07-18 | End: 2024-07-18

## 2024-07-18 RX ORDER — SODIUM CHLORIDE 0.9 % (FLUSH) 0.9 %
5-40 SYRINGE (ML) INJECTION PRN
Status: CANCELLED | OUTPATIENT
Start: 2024-07-18

## 2024-07-18 RX ORDER — SODIUM CHLORIDE 9 MG/ML
INJECTION, SOLUTION INTRAVENOUS CONTINUOUS
Status: CANCELLED | OUTPATIENT
Start: 2024-07-18

## 2024-07-23 ENCOUNTER — TELEPHONE (OUTPATIENT)
Dept: GASTROENTEROLOGY | Age: 64
End: 2024-07-23

## 2024-07-23 NOTE — TELEPHONE ENCOUNTER
LFT shows abnormal transaminases, patient continues to have epigastric pain and imaging study shows dilated bile duct, patient is status postcholecystectomy, possible CBD stone.  Patient referred to Dr. Rivers for ERCP/EUS.

## 2024-08-07 ENCOUNTER — ANESTHESIA EVENT (OUTPATIENT)
Dept: ENDOSCOPY | Age: 64
End: 2024-08-07
Payer: COMMERCIAL

## 2024-08-07 NOTE — ANESTHESIA PRE PROCEDURE
12/21/2023    L4-5 laminectomy with bilateral foraminotomies, extension of the fusion to L4, revision of posterior lumbar interbody fusion with rods, screws, interbody cages. [place]up[place]go [place] [facility] [place]   • MD INSJ BIOMCHN DEV INTERVERTEBRAL DSC SPC W/ARTHRD N/A 9/20/2018    L5-S1 PLIF (POSTERIOR LUMBAR INTERBODY FUSION) performed [place]on [place] [facility] [place]   • SPINE SURGERY      L 4 disc OR   • TONSILLECTOMY     • UPPER GASTROINTESTINAL ENDOSCOPY N/A 6/5/2024    ESOPHAGOGASTRODUODENOSCOPY WITH BIOPSIES performed by Amaya Zavaleta MD [place] [facility]       Social History:    Social History     Tobacco Use   • Smoking status: Never     Passive exposure: Past   • Smokeless tobacco: Never   • Tobacco comments:     pt states that she smoked maybe 5 cigarettes in her teens.   Substance Use Topics   • Alcohol use: Not Currently                                Counseling given: Not Answered  Tobacco comments: pt states that she smoked maybe 5 cigarettes in her teens.      Vital Signs (Current): There were no vitals filed for this visit.                                           BP Readings from Last 3 Encounters:   06/05/24 138/88   06/02/24 139/81   05/30/24 (!) 170/82       NPO Status:                                                                                 BMI:   Wt Readings from Last 3 Encounters:   07/17/24 56.2 kg (124 lb)   06/05/24 59 kg (130 lb)   06/02/24 60.3 kg (133 lb)     There is no height or weight on file to calculate BMI.    CBC:   Lab Results   Component Value Date/Time    WBC 6.1 06/02/2024 10:45 AM    RBC 4.39 06/02/2024 10:45 AM    HGB 14.3 06/02/2024 10:45 AM    HCT 43.2 06/02/2024 10:45 AM    MCV 98.4 06/02/2024 10:45 AM    RDW 12.4 06/02/2024 10:45 AM     06/02/2024 10:45 AM       CMP:   Lab Results   Component Value Date/Time     06/02/2024 10:45 AM    K 5.1 06/02/2024 10:45 AM     06/02/2024 10:45 AM    CO2 22 06/02/2024 10:45 AM    BUN 7 06/02/2024

## 2024-08-08 ENCOUNTER — ANESTHESIA (OUTPATIENT)
Dept: ENDOSCOPY | Age: 64
End: 2024-08-08
Payer: COMMERCIAL

## 2024-08-08 ENCOUNTER — HOSPITAL ENCOUNTER (OUTPATIENT)
Age: 64
Setting detail: OUTPATIENT SURGERY
Discharge: HOME | End: 2024-08-08
Attending: INTERNAL MEDICINE | Admitting: INTERNAL MEDICINE
Payer: COMMERCIAL

## 2024-08-08 ENCOUNTER — APPOINTMENT (OUTPATIENT)
Dept: GENERAL RADIOLOGY | Age: 64
End: 2024-08-08
Attending: INTERNAL MEDICINE
Payer: COMMERCIAL

## 2024-08-08 VITALS
HEART RATE: 81 BPM | HEIGHT: 61 IN | DIASTOLIC BLOOD PRESSURE: 81 MMHG | BODY MASS INDEX: 22.28 KG/M2 | TEMPERATURE: 97 F | RESPIRATION RATE: 16 BRPM | WEIGHT: 118 LBS | SYSTOLIC BLOOD PRESSURE: 151 MMHG | OXYGEN SATURATION: 99 %

## 2024-08-08 DIAGNOSIS — Z96.89 PRESENCE OF PANCREATIC DUCT STENT: Primary | ICD-10-CM

## 2024-08-08 DIAGNOSIS — R52 PAIN: ICD-10-CM

## 2024-08-08 DIAGNOSIS — R79.89 ABNORMAL LFTS: ICD-10-CM

## 2024-08-08 PROBLEM — R10.10 PAIN OF UPPER ABDOMEN: Status: ACTIVE | Noted: 2024-08-08

## 2024-08-08 PROCEDURE — C2625 STENT, NON-COR, TEM W/DEL SY: HCPCS | Performed by: INTERNAL MEDICINE

## 2024-08-08 PROCEDURE — 88305 TISSUE EXAM BY PATHOLOGIST: CPT

## 2024-08-08 PROCEDURE — 3609018800 HC ERCP DX COLLECTION SPECIMEN BRUSHING/WASHING: Performed by: INTERNAL MEDICINE

## 2024-08-08 PROCEDURE — 74328 X-RAY BILE DUCT ENDOSCOPY: CPT | Performed by: INTERNAL MEDICINE

## 2024-08-08 PROCEDURE — 2580000003 HC RX 258: Performed by: INTERNAL MEDICINE

## 2024-08-08 PROCEDURE — 2780000010 HC IMPLANT OTHER: Performed by: INTERNAL MEDICINE

## 2024-08-08 PROCEDURE — 6370000000 HC RX 637 (ALT 250 FOR IP): Performed by: INTERNAL MEDICINE

## 2024-08-08 PROCEDURE — 2580000003 HC RX 258

## 2024-08-08 PROCEDURE — C1769 GUIDE WIRE: HCPCS | Performed by: INTERNAL MEDICINE

## 2024-08-08 PROCEDURE — 74300 X-RAY BILE DUCTS/PANCREAS: CPT

## 2024-08-08 PROCEDURE — 7100000000 HC PACU RECOVERY - FIRST 15 MIN: Performed by: INTERNAL MEDICINE

## 2024-08-08 PROCEDURE — 88112 CYTOPATH CELL ENHANCE TECH: CPT

## 2024-08-08 PROCEDURE — 43274 ERCP DUCT STENT PLACEMENT: CPT | Performed by: INTERNAL MEDICINE

## 2024-08-08 PROCEDURE — 6360000002 HC RX W HCPCS: Performed by: REGISTERED NURSE

## 2024-08-08 PROCEDURE — 7100000010 HC PHASE II RECOVERY - FIRST 15 MIN: Performed by: INTERNAL MEDICINE

## 2024-08-08 PROCEDURE — 6360000004 HC RX CONTRAST MEDICATION: Performed by: INTERNAL MEDICINE

## 2024-08-08 PROCEDURE — 3700000000 HC ANESTHESIA ATTENDED CARE: Performed by: INTERNAL MEDICINE

## 2024-08-08 PROCEDURE — 2720000010 HC SURG SUPPLY STERILE: Performed by: INTERNAL MEDICINE

## 2024-08-08 PROCEDURE — 3700000001 HC ADD 15 MINUTES (ANESTHESIA): Performed by: INTERNAL MEDICINE

## 2024-08-08 PROCEDURE — 2709999900 HC NON-CHARGEABLE SUPPLY: Performed by: INTERNAL MEDICINE

## 2024-08-08 PROCEDURE — 7100000011 HC PHASE II RECOVERY - ADDTL 15 MIN: Performed by: INTERNAL MEDICINE

## 2024-08-08 PROCEDURE — 7100000001 HC PACU RECOVERY - ADDTL 15 MIN: Performed by: INTERNAL MEDICINE

## 2024-08-08 PROCEDURE — 2500000003 HC RX 250 WO HCPCS: Performed by: REGISTERED NURSE

## 2024-08-08 DEVICE — BILIARY STENT WITH NAVIFLEXTM RX DELIVERY SYSTEM
Type: IMPLANTABLE DEVICE | Site: BILE DUCT | Status: FUNCTIONAL
Brand: ADVANIX™ BILIARY

## 2024-08-08 DEVICE — PANCREATIC STENT KIT
Type: IMPLANTABLE DEVICE | Site: BILE DUCT | Status: FUNCTIONAL
Brand: ADVANIX™ PANCREATIC STENT KIT

## 2024-08-08 RX ORDER — DOXYCYCLINE HYCLATE 100 MG/1
100 CAPSULE ORAL 2 TIMES DAILY
COMMUNITY

## 2024-08-08 RX ORDER — ONDANSETRON 2 MG/ML
INJECTION INTRAMUSCULAR; INTRAVENOUS PRN
Status: DISCONTINUED | OUTPATIENT
Start: 2024-08-08 | End: 2024-08-08 | Stop reason: SDUPTHER

## 2024-08-08 RX ORDER — INDOMETHACIN 100 MG
100 SUPPOSITORY, RECTAL RECTAL ONCE
Status: DISCONTINUED | OUTPATIENT
Start: 2024-08-08 | End: 2024-08-08 | Stop reason: HOSPADM

## 2024-08-08 RX ORDER — SODIUM CHLORIDE 9 MG/ML
INJECTION, SOLUTION INTRAVENOUS CONTINUOUS
Status: DISCONTINUED | OUTPATIENT
Start: 2024-08-08 | End: 2024-08-08 | Stop reason: HOSPADM

## 2024-08-08 RX ORDER — GLUCAGON 1 MG/ML
KIT INJECTION
Status: DISCONTINUED
Start: 2024-08-08 | End: 2024-08-08 | Stop reason: HOSPADM

## 2024-08-08 RX ORDER — DICYCLOMINE HYDROCHLORIDE 10 MG/1
10 CAPSULE ORAL 3 TIMES DAILY
COMMUNITY
End: 2024-08-09

## 2024-08-08 RX ORDER — ROCURONIUM BROMIDE 10 MG/ML
INJECTION, SOLUTION INTRAVENOUS PRN
Status: DISCONTINUED | OUTPATIENT
Start: 2024-08-08 | End: 2024-08-08 | Stop reason: SDUPTHER

## 2024-08-08 RX ORDER — SODIUM CHLORIDE 0.9 % (FLUSH) 0.9 %
5-40 SYRINGE (ML) INJECTION PRN
Status: DISCONTINUED | OUTPATIENT
Start: 2024-08-08 | End: 2024-08-08 | Stop reason: HOSPADM

## 2024-08-08 RX ORDER — SODIUM CHLORIDE 9 MG/ML
INJECTION, SOLUTION INTRAVENOUS PRN
Status: DISCONTINUED | OUTPATIENT
Start: 2024-08-08 | End: 2024-08-08 | Stop reason: HOSPADM

## 2024-08-08 RX ORDER — PROPOFOL 10 MG/ML
INJECTION, EMULSION INTRAVENOUS PRN
Status: DISCONTINUED | OUTPATIENT
Start: 2024-08-08 | End: 2024-08-08 | Stop reason: SDUPTHER

## 2024-08-08 RX ORDER — DEXAMETHASONE SODIUM PHOSPHATE 10 MG/ML
INJECTION INTRAMUSCULAR; INTRAVENOUS PRN
Status: DISCONTINUED | OUTPATIENT
Start: 2024-08-08 | End: 2024-08-08 | Stop reason: SDUPTHER

## 2024-08-08 RX ORDER — INDOMETHACIN 100 MG
SUPPOSITORY, RECTAL RECTAL PRN
Status: DISCONTINUED | OUTPATIENT
Start: 2024-08-08 | End: 2024-08-08 | Stop reason: ALTCHOICE

## 2024-08-08 RX ORDER — SODIUM CHLORIDE 0.9 % (FLUSH) 0.9 %
5-40 SYRINGE (ML) INJECTION EVERY 12 HOURS SCHEDULED
Status: DISCONTINUED | OUTPATIENT
Start: 2024-08-08 | End: 2024-08-08 | Stop reason: HOSPADM

## 2024-08-08 RX ORDER — LIDOCAINE HYDROCHLORIDE 20 MG/ML
INJECTION, SOLUTION INFILTRATION; PERINEURAL PRN
Status: DISCONTINUED | OUTPATIENT
Start: 2024-08-08 | End: 2024-08-08 | Stop reason: SDUPTHER

## 2024-08-08 RX ORDER — SODIUM CHLORIDE, SODIUM LACTATE, POTASSIUM CHLORIDE, CALCIUM CHLORIDE 600; 310; 30; 20 MG/100ML; MG/100ML; MG/100ML; MG/100ML
INJECTION, SOLUTION INTRAVENOUS
Status: COMPLETED
Start: 2024-08-08 | End: 2024-08-08

## 2024-08-08 RX ADMIN — ROCURONIUM BROMIDE 50 MG: 10 INJECTION, SOLUTION INTRAVENOUS at 08:00

## 2024-08-08 RX ADMIN — LIDOCAINE HYDROCHLORIDE 60 MG: 20 INJECTION, SOLUTION INFILTRATION; PERINEURAL at 08:00

## 2024-08-08 RX ADMIN — SODIUM CHLORIDE, POTASSIUM CHLORIDE, SODIUM LACTATE AND CALCIUM CHLORIDE 1000 ML: 600; 310; 30; 20 INJECTION, SOLUTION INTRAVENOUS at 07:19

## 2024-08-08 RX ADMIN — ONDANSETRON 4 MG: 2 INJECTION INTRAMUSCULAR; INTRAVENOUS at 09:42

## 2024-08-08 RX ADMIN — SODIUM CHLORIDE: 9 INJECTION, SOLUTION INTRAVENOUS at 08:00

## 2024-08-08 RX ADMIN — ONDANSETRON 4 MG: 2 INJECTION INTRAMUSCULAR; INTRAVENOUS at 08:00

## 2024-08-08 RX ADMIN — PROPOFOL 200 MG: 10 INJECTION, EMULSION INTRAVENOUS at 08:00

## 2024-08-08 RX ADMIN — DEXAMETHASONE SODIUM PHOSPHATE 8 MG: 10 INJECTION INTRAMUSCULAR; INTRAVENOUS at 08:00

## 2024-08-08 RX ADMIN — SUGAMMADEX 200 MG: 100 INJECTION, SOLUTION INTRAVENOUS at 08:55

## 2024-08-08 ASSESSMENT — PAIN DESCRIPTION - LOCATION
LOCATION: ABDOMEN

## 2024-08-08 ASSESSMENT — PAIN DESCRIPTION - FREQUENCY
FREQUENCY: CONTINUOUS

## 2024-08-08 ASSESSMENT — PAIN SCALES - GENERAL
PAINLEVEL_OUTOF10: 8

## 2024-08-08 ASSESSMENT — PAIN - FUNCTIONAL ASSESSMENT: PAIN_FUNCTIONAL_ASSESSMENT: 0-10

## 2024-08-08 NOTE — H&P
Patient Name: Joanne Masterson  : 1960  MRN: [id number]  DATE: 24      ENDOSCOPY  History and Physical    Procedure:    [] Diagnostic Colonoscopy       [] Screening Colonoscopy  [] EGD      [x] ERCP      [] EUS       [] Other    [x] Previous office notes/History and Physical reviewed from the patients chart. Please see EMR for further details of HPI. I have examined the patient's status immediately prior to the procedure and:      Indications/HPI:    []Abdominal Pain   []Cancer- GI/Lung  []Fhx of colon CA  []History of Polyps   []Goden’s   []Melena  []Abnormal Imaging   []Dysphagia    []Persistent Pneumonia  []Anemia   []Food Impaction  []History of Polyps  []GI Bleed   []Pulmonary nodule/Mass  []Change in bowel habits  []Heartburn/Reflux  []Rectal Bleed (BRBPR)  []Chest Pain - Non Cardiac  []Heme (+) Stool  []Ulcers  []Constipation   []Hemoptysis   []Varices  []Diarrhea   []Hypoxemia  []Nausea/Vomiting   []Screening   []Crohns/Colitis  [x]Other: 64-year-old female referred for ERCP with constellation of upper GI symptoms including nausea, right upper quadrant and epigastric abdominal pain, reduced oral intake, remote history of cholecystectomy for severe acute cholecystitis, in the setting of abnormal transaminases and dilated common bile duct without any obvious etiology on CT and MRI.    Anesthesia:   [x] MAC [] Moderate Sedation   [] General   [] None     ROS: 12 pt Review of Symptoms was negative unless mentioned above    Medications:   Prior to Admission medications    Medication Sig Start Date End Date Taking? Authorizing Provider   doxycycline hyclate (VIBRAMYCIN) 100 MG capsule Take 1 capsule by mouth 2 times daily   Yes Amish Stinson MD   dicyclomine (BENTYL) 10 MG capsule Take 1 capsule by mouth 3 times daily Ran out and it was helping   Yes Amish Stinson MD   hyoscyamine (LEVSIN/SL) 125 MCG sublingual tablet Place 1 tablet under the tongue every 4 hours as needed (pain)  The patient does wish to proceed with the procedure at this time.    Bart Rivers MD  8:59 AM

## 2024-08-08 NOTE — ANESTHESIA POSTPROCEDURE EVALUATION
Department of Anesthesiology  Postprocedure Note    Patient: Joanne Masterson  MRN: [id number]  YOB: 1960  Date of evaluation: 8/8/2024    Procedure Summary       Date: 08/08/24 Room / Location: [facility] OR 02 / [facility]    Anesthesia Start: 0748 Anesthesia Stop: 0912    Procedure: ENDOSCOPIC RETROGRADE CHOLANGIOPANCREATOGRAPHY WITH SPHINCTEROTOMY, BRUSHINGS, BILE DUCT STENTS, PANCREATIC STENT AND BALLOON SWEEP Diagnosis:       Abnormal LFTs      (Abnormal LFTs [R79.89])    Surgeons: Bart Rivers MD Responsible Provider:     Anesthesia Type: general ASA Status: 3            Anesthesia Type: No value filed.    Nancy Phase I: Nancy Score: 9    Nancy Phase II:      Anesthesia Post Evaluation    Patient location during evaluation: bedside  Patient participation: complete - patient participated  Level of consciousness: awake and awake and alert  Airway patency: patent  Nausea & Vomiting: no nausea and no vomiting  Cardiovascular status: blood pressure returned to baseline and hemodynamically stable  Respiratory status: acceptable  Hydration status: euvolemic  Pain management: adequate        No notable events documented.

## 2024-08-09 ENCOUNTER — TELEPHONE (OUTPATIENT)
Dept: GASTROENTEROLOGY | Age: 64
End: 2024-08-09

## 2024-08-09 ENCOUNTER — HOSPITAL ENCOUNTER (OUTPATIENT)
Dept: GENERAL RADIOLOGY | Age: 64
Discharge: HOME | End: 2024-08-09
Attending: INTERNAL MEDICINE
Payer: COMMERCIAL

## 2024-08-09 DIAGNOSIS — R10.10 PAIN OF UPPER ABDOMEN: Primary | ICD-10-CM

## 2024-08-09 DIAGNOSIS — R10.10 PAIN OF UPPER ABDOMEN: ICD-10-CM

## 2024-08-09 LAB
ALBUMIN SERPL-MCNC: 4.3 G/DL (ref 3.5–4.6)
ALP SERPL-CCNC: 95 U/L (ref 40–130)
ALT SERPL-CCNC: 30 U/L (ref 0–33)
ANION GAP SERPL CALCULATED.3IONS-SCNC: 13 MEQ/L (ref 9–15)
AST SERPL-CCNC: 16 U/L (ref 0–35)
BILIRUB SERPL-MCNC: 0.5 MG/DL (ref 0.2–0.7)
BUN SERPL-MCNC: 20 MG/DL (ref 8–23)
CALCIUM SERPL-MCNC: 9.6 MG/DL (ref 8.5–9.9)
CHLORIDE SERPL-SCNC: 98 MEQ/L (ref 95–107)
CO2 SERPL-SCNC: 23 MEQ/L (ref 20–31)
CREAT SERPL-MCNC: 0.98 MG/DL (ref 0.5–0.9)
ERYTHROCYTE [DISTWIDTH] IN BLOOD BY AUTOMATED COUNT: 12.1 % (ref 11.5–14.5)
GLOBULIN SER CALC-MCNC: 2.5 G/DL (ref 2.3–3.5)
GLUCOSE SERPL-MCNC: 79 MG/DL (ref 70–99)
HCT VFR BLD AUTO: 40.3 % (ref 37–47)
HGB BLD-MCNC: 14.6 G/DL (ref 12–16)
LIPASE SERPL-CCNC: 55 U/L (ref 12–95)
MCH RBC QN AUTO: 32.9 PG (ref 27–31.3)
MCHC RBC AUTO-ENTMCNC: 36.2 % (ref 33–37)
MCV RBC AUTO: 90.8 FL (ref 79.4–94.8)
PLATELET # BLD AUTO: 340 K/UL (ref 130–400)
POTASSIUM SERPL-SCNC: 4.7 MEQ/L (ref 3.4–4.9)
PROT SERPL-MCNC: 6.8 G/DL (ref 6.3–8)
RBC # BLD AUTO: 4.44 M/UL (ref 4.2–5.4)
SODIUM SERPL-SCNC: 134 MEQ/L (ref 135–144)
WBC # BLD AUTO: 12.4 K/UL (ref 4.8–10.8)

## 2024-08-09 PROCEDURE — 74018 RADEX ABDOMEN 1 VIEW: CPT

## 2024-08-09 RX ORDER — DICYCLOMINE HYDROCHLORIDE 10 MG/1
10 CAPSULE ORAL
Qty: 60 CAPSULE | Refills: 1 | Status: SHIPPED | OUTPATIENT
Start: 2024-08-09

## 2024-08-09 NOTE — TELEPHONE ENCOUNTER
Patients  called stating his wife is in a lot of pain and wants to know if you can send something to her pharmacy. Please advise. Thanks

## 2024-08-09 NOTE — TELEPHONE ENCOUNTER
Called patient, talked to patient and her .  Patient with persistent ongoing upper abdominal, chest and back pain.  Reports pain being 8 out of 10 prior to the procedure now it is 9-10 out of 10.  Has not been able to obtain her Bentyl medication from her physician at [facility].  She denies any nausea, vomiting, hematemesis or hematochezia or melena.  She did have a small bowel movement this morning with associated flatus.    Patient advised to have a evaluation in urgent care or emergency room to assess the abdominal pain.  Additionally recommend checking labs CBC/CMP/lipase.  Patient not keen on going to the ER at this time, will proceed with labs as advised above and abdominal x-ray to exclude free air.    Suspect mild exacerbation of underlying abdominal symptoms, based on clinical history on phone does not appear to have pancreatitis, significant bleeding or bowel perforation.    Patient additionally given prescription of Bentyl as is having difficulty to obtain it from [facility] and reports improvement in her baseline symptoms with antispasmodic.    Patient has a follow-up with Dr. Zavaleta in the next 2 weeks    Bart Rivers MD

## 2024-08-12 RX ORDER — BETAMETHASONE SODIUM PHOSPHATE AND BETAMETHASONE ACETATE 3; 3 MG/ML; MG/ML
INJECTION, SUSPENSION INTRA-ARTICULAR; INTRALESIONAL; INTRAMUSCULAR; SOFT TISSUE
Status: DISPENSED
Start: 2024-08-12 | End: 2024-08-12

## 2024-08-12 RX ORDER — DEXAMETHASONE SODIUM PHOSPHATE 10 MG/ML
INJECTION, SOLUTION INTRAMUSCULAR; INTRAVENOUS
Status: DISPENSED
Start: 2024-08-12 | End: 2024-08-12

## 2024-08-20 RX ORDER — TIZANIDINE 4 MG/1
4 TABLET ORAL 2 TIMES DAILY PRN
Qty: 40 TABLET | Refills: 1 | Status: SHIPPED | OUTPATIENT
Start: 2024-08-20

## 2024-08-21 ENCOUNTER — OFFICE VISIT (OUTPATIENT)
Dept: GASTROENTEROLOGY | Age: 64
End: 2024-08-21
Payer: COMMERCIAL

## 2024-08-21 ENCOUNTER — HOSPITAL ENCOUNTER (OUTPATIENT)
Dept: GENERAL RADIOLOGY | Age: 64
Discharge: HOME | End: 2024-08-23
Attending: SPECIALIST
Payer: COMMERCIAL

## 2024-08-21 VITALS — BODY MASS INDEX: 21.9 KG/M2 | WEIGHT: 116 LBS | HEIGHT: 61 IN | OXYGEN SATURATION: 98 % | HEART RATE: 84 BPM

## 2024-08-21 DIAGNOSIS — R10.13 DYSPEPSIA: Primary | ICD-10-CM

## 2024-08-21 DIAGNOSIS — T85.528D MIGRATION OF PANCREATIC STENT, SUBSEQUENT ENCOUNTER: ICD-10-CM

## 2024-08-21 DIAGNOSIS — R11.0 NAUSEA: ICD-10-CM

## 2024-08-21 PROCEDURE — 99213 OFFICE O/P EST LOW 20 MIN: CPT | Performed by: SPECIALIST

## 2024-08-21 PROCEDURE — 74018 RADEX ABDOMEN 1 VIEW: CPT

## 2024-08-21 RX ORDER — ONDANSETRON 8 MG/1
8 TABLET, ORALLY DISINTEGRATING ORAL EVERY 8 HOURS PRN
COMMUNITY
Start: 2024-08-09

## 2024-08-21 RX ORDER — DOXYCYCLINE HYCLATE 100 MG
TABLET ORAL
COMMUNITY
Start: 2024-08-03

## 2024-08-21 ASSESSMENT — ENCOUNTER SYMPTOMS
VOMITING: 0
ABDOMINAL DISTENTION: 0
ANAL BLEEDING: 0
NAUSEA: 0
DIARRHEA: 0
RESPIRATORY NEGATIVE: 1
BLOOD IN STOOL: 0
RECTAL PAIN: 0
EYES NEGATIVE: 1
ABDOMINAL PAIN: 1
CONSTIPATION: 0

## 2024-08-21 NOTE — PROGRESS NOTES
Gastroenterology Clinic Follow up Visit    Joanne Masterson  [id number]  Chief Complaint   Patient presents with    Follow-up       HPI and A/P at last visit summarized below:  Patient is here for follow-up, LFTs showed abnormal transaminases and because of the symptoms and dilated bile duct patient underwent ERCP which showed papillary stenosis and had stent placed in the pancreatic duct and bile duct.  Cytology obtained from the biliary brushing was unremarkable.  Repeat LFT from August 9, 2024 shows normal alk phos and normal transaminases.,  Patient still has abdominal discomfort describes it as stabbing , burning pain in the epigastric area and also in to the lower chest.  Patient was constipated and was taking MiraLAX with passage of watery stool.  Also had tried Gas-X and other over-the-counter medications.  Patient reports that fasting seems to alleviate her symptoms and has been losing since she is not able to maintain oral nourishment..    Review of Systems   Constitutional: Negative.    HENT: Negative.     Eyes: Negative.    Respiratory: Negative.     Cardiovascular: Negative.    Gastrointestinal:  Positive for abdominal pain. Negative for abdominal distention, anal bleeding, blood in stool, constipation, diarrhea, nausea, rectal pain and vomiting.   Endocrine: Negative.    Genitourinary: Negative.    Musculoskeletal: Negative.    Skin: Negative.    Allergic/Immunologic: Negative for food allergies.   Neurological: Negative.    Hematological: Negative.    Psychiatric/Behavioral: Negative.          Past medical history, past surgical history, medication list, social and familyhistory reviewed    Pulse 84, height 1.549 m (5' 1\"), weight 52.6 kg (116 lb), last menstrual period 09/19/2012, SpO2 98%, not currently breastfeeding.    Physical Exam  Constitutional:       Appearance: She is well-developed.   HENT:      Head: Normocephalic and atraumatic.   Eyes:      Conjunctiva/sclera: Conjunctivae normal.

## 2024-08-22 RX ORDER — ONDANSETRON 4 MG/1
4 TABLET, ORALLY DISINTEGRATING ORAL EVERY 6 HOURS PRN
Qty: 30 TABLET | Refills: 0 | Status: SHIPPED | OUTPATIENT
Start: 2024-08-22

## 2024-09-03 ENCOUNTER — HOSPITAL ENCOUNTER (OUTPATIENT)
Dept: ULTRASOUND IMAGING | Age: 64
Discharge: HOME | End: 2024-09-05
Payer: COMMERCIAL

## 2024-09-03 DIAGNOSIS — R10.13 DYSPEPSIA: ICD-10-CM

## 2024-09-03 PROCEDURE — 93976 VASCULAR STUDY: CPT

## 2024-09-04 ENCOUNTER — TELEPHONE (OUTPATIENT)
Dept: GASTROENTEROLOGY | Age: 64
End: 2024-09-04

## 2024-09-04 DIAGNOSIS — T82.858S: Primary | ICD-10-CM

## 2024-09-04 NOTE — TELEPHONE ENCOUNTER
X-ray 09/04/2024 Doppler study showed abnormality of the mesenteric vessels and patient was referred to interventional radiology , patient notified

## 2024-09-30 ENCOUNTER — PREP FOR PROCEDURE (OUTPATIENT)
Dept: GASTROENTEROLOGY | Age: 64
End: 2024-09-30

## 2024-09-30 RX ORDER — SODIUM CHLORIDE 0.9 % (FLUSH) 0.9 %
5-40 SYRINGE (ML) INJECTION EVERY 12 HOURS SCHEDULED
Status: CANCELLED | OUTPATIENT
Start: 2024-09-30

## 2024-09-30 RX ORDER — SODIUM CHLORIDE 9 MG/ML
INJECTION, SOLUTION INTRAVENOUS CONTINUOUS
Status: CANCELLED | OUTPATIENT
Start: 2024-09-30

## 2024-09-30 RX ORDER — SODIUM CHLORIDE 9 MG/ML
INJECTION, SOLUTION INTRAVENOUS PRN
Status: CANCELLED | OUTPATIENT
Start: 2024-09-30

## 2024-09-30 RX ORDER — SODIUM CHLORIDE 0.9 % (FLUSH) 0.9 %
5-40 SYRINGE (ML) INJECTION PRN
Status: CANCELLED | OUTPATIENT
Start: 2024-09-30

## 2024-09-30 RX ORDER — INDOMETHACIN 100 MG
100 SUPPOSITORY, RECTAL RECTAL ONCE
Status: CANCELLED | OUTPATIENT
Start: 2024-09-30 | End: 2024-09-30

## 2024-10-01 ENCOUNTER — OFFICE VISIT (OUTPATIENT)
Dept: URGENT CARE | Age: 64
End: 2024-10-01
Payer: COMMERCIAL

## 2024-10-01 ENCOUNTER — ANCILLARY PROCEDURE (OUTPATIENT)
Dept: URGENT CARE | Age: 64
End: 2024-10-01
Payer: COMMERCIAL

## 2024-10-01 VITALS
HEIGHT: 61 IN | WEIGHT: 110 LBS | TEMPERATURE: 98.2 F | DIASTOLIC BLOOD PRESSURE: 82 MMHG | SYSTOLIC BLOOD PRESSURE: 151 MMHG | BODY MASS INDEX: 20.77 KG/M2 | HEART RATE: 84 BPM | OXYGEN SATURATION: 95 % | RESPIRATION RATE: 20 BRPM

## 2024-10-01 DIAGNOSIS — M79.631 PAIN OF RIGHT FOREARM: ICD-10-CM

## 2024-10-01 DIAGNOSIS — M79.631 PAIN OF RIGHT FOREARM: Primary | ICD-10-CM

## 2024-10-01 PROCEDURE — 73090 X-RAY EXAM OF FOREARM: CPT | Mod: RIGHT SIDE | Performed by: NURSE PRACTITIONER

## 2024-10-01 RX ORDER — TIZANIDINE 4 MG/1
TABLET ORAL
COMMUNITY
Start: 2024-09-24

## 2024-10-01 RX ORDER — GABAPENTIN 300 MG/1
300 CAPSULE ORAL 3 TIMES DAILY
Qty: 30 CAPSULE | Refills: 0 | Status: SHIPPED | OUTPATIENT
Start: 2024-10-01 | End: 2024-10-11

## 2024-10-01 RX ORDER — HYOSCYAMINE SULFATE 0.12 MG/1
125 TABLET SUBLINGUAL
COMMUNITY
Start: 2024-06-17

## 2024-10-01 RX ORDER — DOXYCYCLINE HYCLATE 100 MG
TABLET ORAL
COMMUNITY
Start: 2024-08-03

## 2024-10-01 RX ORDER — OMEPRAZOLE 20 MG/1
1 CAPSULE, DELAYED RELEASE ORAL
COMMUNITY
Start: 2023-11-08

## 2024-10-01 RX ORDER — NYSTATIN AND TRIAMCINOLONE ACETONIDE 100000; 1 [USP'U]/G; MG/G
CREAM TOPICAL 2 TIMES DAILY
COMMUNITY
Start: 2024-03-08 | End: 2024-03-22

## 2024-10-01 RX ORDER — HYDROCODONE BITARTRATE AND ACETAMINOPHEN 5; 325 MG/1; MG/1
TABLET ORAL
COMMUNITY

## 2024-10-01 RX ORDER — SODIUM, POTASSIUM,MAG SULFATES 17.5-3.13G
SOLUTION, RECONSTITUTED, ORAL ORAL
COMMUNITY
Start: 2024-05-30

## 2024-10-01 RX ORDER — ONDANSETRON 4 MG/1
1 TABLET, ORALLY DISINTEGRATING ORAL EVERY 6 HOURS PRN
COMMUNITY
Start: 2024-08-22

## 2024-10-01 RX ORDER — ONDANSETRON 4 MG/1
4 TABLET, FILM COATED ORAL
COMMUNITY
Start: 2023-11-29

## 2024-10-01 RX ORDER — DICYCLOMINE HYDROCHLORIDE 10 MG/1
10 CAPSULE ORAL
COMMUNITY
Start: 2024-08-09

## 2024-10-01 RX ORDER — FLUCONAZOLE 150 MG/1
1 TABLET ORAL ONCE
COMMUNITY
Start: 2024-03-08

## 2024-10-01 RX ORDER — LISINOPRIL 40 MG/1
1 TABLET ORAL 2 TIMES DAILY
COMMUNITY
Start: 2018-02-19

## 2024-10-01 RX ORDER — IBUPROFEN 800 MG/1
800 TABLET ORAL EVERY 8 HOURS PRN
COMMUNITY
Start: 2023-09-05

## 2024-10-01 NOTE — PROGRESS NOTES
Subjective   Patient ID: Lety Josue is a 64 y.o. female. They present today with a chief complaint of Wound Check (Was seen on 8/3/24 in this urgent care for this issue. On going wound on rt forearm x6 yrs. States some clear drainage and odder. Pain radiates up arm and into shoulder.).    History of Present Illness  HPI    Pt presents to urgent care with c/o  R forearm pain which has been ongoing for 6 months, worse in the past few days.  Reports severe burning type pain.  Denies falls, trauma, injuries, rash, etc.  Pt denies CP, SOB, palpitations, fevers, abd pain, n/v/d, sick contacts, recent travel.          Past Medical History  Allergies as of 10/01/2024 - Reviewed 10/01/2024   Allergen Reaction Noted    Amlodipine Swelling 2017    Metoprolol Other and Swelling 2016    Pregabalin Other 10/26/2016       (Not in a hospital admission)       No past medical history on file.    Past Surgical History:   Procedure Laterality Date    BACK SURGERY  2018    Lower Back Surgery     SECTION, CLASSIC  2018     Section    CHOLECYSTECTOMY  2018    Cholecystectomy Laparoscopic    FOOT SURGERY  2018    Foot Surgery Right    TONSILLECTOMY  2018    Tonsillectomy    TUBAL LIGATION  2018    Tubal Ligation            Review of Systems  Review of Systems   Constitutional: Negative.    HENT: Negative.     Eyes: Negative.    Respiratory: Negative.     Cardiovascular:  Negative for chest pain and palpitations.   Gastrointestinal: Negative.    Endocrine: Negative.    Genitourinary: Negative.    Musculoskeletal: Negative.    Skin: Negative.    Allergic/Immunologic: Negative.    Neurological: Negative.    Hematological: Negative.    Psychiatric/Behavioral: Negative.     All other systems reviewed and are negative.                                 Objective    Vitals:    10/01/24 1838   BP: 151/82   BP Location: Left arm   Patient Position: Sitting   BP Cuff Size: Adult  "  Pulse: 84   Resp: 20   Temp: 36.8 °C (98.2 °F)   TempSrc: Oral   SpO2: 95%   Weight: 49.9 kg (110 lb)   Height: 1.549 m (5' 1\")     No LMP recorded. Patient is postmenopausal.    Physical Exam  Vitals and nursing note reviewed.   Constitutional:       General: She is not in acute distress.     Appearance: Normal appearance. She is not ill-appearing or toxic-appearing.   HENT:      Head: Atraumatic.      Right Ear: Tympanic membrane, ear canal and external ear normal.      Left Ear: Tympanic membrane, ear canal and external ear normal.      Nose: Nose normal.      Mouth/Throat:      Mouth: Mucous membranes are moist.      Pharynx: Oropharynx is clear. No oropharyngeal exudate or posterior oropharyngeal erythema.   Eyes:      Extraocular Movements: Extraocular movements intact.      Conjunctiva/sclera: Conjunctivae normal.      Pupils: Pupils are equal, round, and reactive to light.   Cardiovascular:      Rate and Rhythm: Normal rate and regular rhythm.      Pulses: Normal pulses.      Heart sounds: Normal heart sounds.   Pulmonary:      Effort: Pulmonary effort is normal.      Breath sounds: Normal breath sounds.   Abdominal:      General: Abdomen is flat. Bowel sounds are normal.      Palpations: Abdomen is soft.      Tenderness: There is no abdominal tenderness.   Musculoskeletal:         General: Normal range of motion.        Arms:       Cervical back: Normal range of motion and neck supple. No tenderness.   Skin:     General: Skin is warm and dry.      Capillary Refill: Capillary refill takes less than 2 seconds.   Neurological:      General: No focal deficit present.      Mental Status: She is alert and oriented to person, place, and time.   Psychiatric:         Mood and Affect: Mood normal.         Behavior: Behavior normal.         Thought Content: Thought content normal.         Procedures    Point of Care Test & Imaging Results from this visit  No results found for this visit on 10/01/24.   No results " found.    Diagnostic study results (if any) were reviewed by JULIENNE Cooper.    Assessment/Plan   Allergies, medications, history, and pertinent labs/EKGs/Imaging reviewed by JULIENNE Cooper.     Medical Decision Making  Pt presents with burning pain in R forearm.  Fingers are warm to touch with brisk cap refill.  Intact pulses.  ROM intact.   MSPs intact.  Xrays of forearm negative for acute abnormality per rad report.  Unclear etiology of pt's symptoms.  Given her reports of burning pain, suspect neuropathic pain.  Pt states she cannot take Lyrica but was on Gabapentin for years without issue.  OARRS report checked, score 200.  Will prescribe Gabapentin at this time.  At time of discharge patient was clinically well-appearing and HDS for outpatient management. The patient and/or family was educated regarding diagnosis, supportive care, OTC and Rx medications. The patient and/or family was given the opportunity to ask questions prior to discharge.  They verbalized understanding of my discussion of the plans for treatment, expected course, indications to return to  or seek further evaluation in ED, and the need for timely follow up as directed.   They were provided with a work/school excuse if requested.       Orders and Diagnoses  Diagnoses and all orders for this visit:  Pain of right forearm  -     XR forearm right 2 views; Future  -     gabapentin (Neurontin) 300 mg capsule; Take 1 capsule (300 mg) by mouth 3 times a day for 10 days.      Medical Admin Record      Patient disposition: Home    Electronically signed by JULIENNE Cooper  9:43 AM

## 2024-10-03 ENCOUNTER — ANESTHESIA (OUTPATIENT)
Dept: ENDOSCOPY | Age: 64
End: 2024-10-03
Payer: COMMERCIAL

## 2024-10-03 ENCOUNTER — HOSPITAL ENCOUNTER (OUTPATIENT)
Age: 64
Setting detail: OUTPATIENT SURGERY
Discharge: HOME | End: 2024-10-03
Attending: INTERNAL MEDICINE | Admitting: INTERNAL MEDICINE
Payer: COMMERCIAL

## 2024-10-03 ENCOUNTER — APPOINTMENT (OUTPATIENT)
Dept: GENERAL RADIOLOGY | Age: 64
End: 2024-10-03
Attending: INTERNAL MEDICINE
Payer: COMMERCIAL

## 2024-10-03 ENCOUNTER — ANESTHESIA EVENT (OUTPATIENT)
Dept: ENDOSCOPY | Age: 64
End: 2024-10-03
Payer: COMMERCIAL

## 2024-10-03 VITALS
SYSTOLIC BLOOD PRESSURE: 160 MMHG | HEART RATE: 98 BPM | TEMPERATURE: 98.4 F | BODY MASS INDEX: 20.77 KG/M2 | WEIGHT: 110 LBS | OXYGEN SATURATION: 100 % | HEIGHT: 61 IN | DIASTOLIC BLOOD PRESSURE: 77 MMHG | RESPIRATION RATE: 18 BRPM

## 2024-10-03 DIAGNOSIS — Z46.89 ENCOUNTER FOR REMOVAL OF BILIARY STENT: ICD-10-CM

## 2024-10-03 DIAGNOSIS — K83.8 COMMON BILE DUCT DILATION: ICD-10-CM

## 2024-10-03 DIAGNOSIS — R79.89 ABNORMAL LFTS: ICD-10-CM

## 2024-10-03 DIAGNOSIS — R79.89 ABNORMAL LFTS: Primary | ICD-10-CM

## 2024-10-03 DIAGNOSIS — R10.10 PAIN OF UPPER ABDOMEN: ICD-10-CM

## 2024-10-03 LAB
ALBUMIN SERPL-MCNC: 4.5 G/DL (ref 3.5–4.6)
ALP SERPL-CCNC: 87 U/L (ref 40–130)
ALT SERPL-CCNC: 69 U/L (ref 0–33)
AST SERPL-CCNC: 60 U/L (ref 0–35)
BILIRUB DIRECT SERPL-MCNC: <0.2 MG/DL (ref 0–0.4)
BILIRUB INDIRECT SERPL-MCNC: ABNORMAL MG/DL (ref 0–0.6)
BILIRUB SERPL-MCNC: 0.4 MG/DL (ref 0.2–0.7)
CARCINOEMBRYONIC ANTIGEN: 3.6 NG/ML (ref 0–3.8)
PROT SERPL-MCNC: 7.1 G/DL (ref 6.3–8)

## 2024-10-03 PROCEDURE — 7100000011 HC PHASE II RECOVERY - ADDTL 15 MIN: Performed by: INTERNAL MEDICINE

## 2024-10-03 PROCEDURE — 6370000000 HC RX 637 (ALT 250 FOR IP): Performed by: INTERNAL MEDICINE

## 2024-10-03 PROCEDURE — 2580000003 HC RX 258

## 2024-10-03 PROCEDURE — 2580000003 HC RX 258: Performed by: INTERNAL MEDICINE

## 2024-10-03 PROCEDURE — 3700000000 HC ANESTHESIA ATTENDED CARE: Performed by: INTERNAL MEDICINE

## 2024-10-03 PROCEDURE — 74328 X-RAY BILE DUCT ENDOSCOPY: CPT | Performed by: INTERNAL MEDICINE

## 2024-10-03 PROCEDURE — 2709999900 HC NON-CHARGEABLE SUPPLY: Performed by: INTERNAL MEDICINE

## 2024-10-03 PROCEDURE — 7100000010 HC PHASE II RECOVERY - FIRST 15 MIN: Performed by: INTERNAL MEDICINE

## 2024-10-03 PROCEDURE — 43261 ENDO CHOLANGIOPANCREATOGRAPH: CPT | Performed by: INTERNAL MEDICINE

## 2024-10-03 PROCEDURE — 3700000001 HC ADD 15 MINUTES (ANESTHESIA): Performed by: INTERNAL MEDICINE

## 2024-10-03 PROCEDURE — 2720000010 HC SURG SUPPLY STERILE: Performed by: INTERNAL MEDICINE

## 2024-10-03 PROCEDURE — 6360000004 HC RX CONTRAST MEDICATION: Performed by: INTERNAL MEDICINE

## 2024-10-03 PROCEDURE — 43275 ERCP REMOVE FORGN BODY DUCT: CPT | Performed by: INTERNAL MEDICINE

## 2024-10-03 PROCEDURE — 88305 TISSUE EXAM BY PATHOLOGIST: CPT

## 2024-10-03 PROCEDURE — C1769 GUIDE WIRE: HCPCS | Performed by: INTERNAL MEDICINE

## 2024-10-03 PROCEDURE — 2500000003 HC RX 250 WO HCPCS: Performed by: REGISTERED NURSE

## 2024-10-03 PROCEDURE — 6360000002 HC RX W HCPCS: Performed by: REGISTERED NURSE

## 2024-10-03 PROCEDURE — 74300 X-RAY BILE DUCTS/PANCREAS: CPT

## 2024-10-03 PROCEDURE — 3609015000 HC ERCP REMOVE FOREIGN BODY/STENT BILIARY/PANC DUCT: Performed by: INTERNAL MEDICINE

## 2024-10-03 RX ORDER — SODIUM CHLORIDE 0.9 % (FLUSH) 0.9 %
5-40 SYRINGE (ML) INJECTION EVERY 12 HOURS SCHEDULED
Status: DISCONTINUED | OUTPATIENT
Start: 2024-10-03 | End: 2024-10-03 | Stop reason: HOSPADM

## 2024-10-03 RX ORDER — SIMETHICONE 80 MG
TABLET,CHEWABLE ORAL
Status: DISCONTINUED
Start: 2024-10-03 | End: 2024-10-03 | Stop reason: HOSPADM

## 2024-10-03 RX ORDER — SODIUM CHLORIDE 9 MG/ML
INJECTION, SOLUTION INTRAVENOUS CONTINUOUS
Status: DISCONTINUED | OUTPATIENT
Start: 2024-10-03 | End: 2024-10-03 | Stop reason: HOSPADM

## 2024-10-03 RX ORDER — INDOMETHACIN 100 MG
100 SUPPOSITORY, RECTAL RECTAL ONCE
Status: DISCONTINUED | OUTPATIENT
Start: 2024-10-03 | End: 2024-10-03 | Stop reason: HOSPADM

## 2024-10-03 RX ORDER — SODIUM CHLORIDE 0.9 % (FLUSH) 0.9 %
5-40 SYRINGE (ML) INJECTION PRN
Status: DISCONTINUED | OUTPATIENT
Start: 2024-10-03 | End: 2024-10-03 | Stop reason: HOSPADM

## 2024-10-03 RX ORDER — SODIUM CHLORIDE 9 MG/ML
INJECTION, SOLUTION INTRAVENOUS PRN
Status: DISCONTINUED | OUTPATIENT
Start: 2024-10-03 | End: 2024-10-03 | Stop reason: HOSPADM

## 2024-10-03 RX ORDER — SODIUM CHLORIDE 9 MG/ML
INJECTION, SOLUTION INTRAVENOUS
Status: COMPLETED
Start: 2024-10-03 | End: 2024-10-03

## 2024-10-03 RX ORDER — IOPAMIDOL 612 MG/ML
INJECTION, SOLUTION INTRATHECAL PRN
Status: DISCONTINUED | OUTPATIENT
Start: 2024-10-03 | End: 2024-10-03 | Stop reason: ALTCHOICE

## 2024-10-03 RX ORDER — GLYCOPYRROLATE 1 MG/5 ML
SYRINGE (ML) INTRAVENOUS
Status: DISCONTINUED | OUTPATIENT
Start: 2024-10-03 | End: 2024-10-03 | Stop reason: SDUPTHER

## 2024-10-03 RX ORDER — PROPOFOL 10 MG/ML
INJECTION, EMULSION INTRAVENOUS
Status: DISCONTINUED | OUTPATIENT
Start: 2024-10-03 | End: 2024-10-03 | Stop reason: SDUPTHER

## 2024-10-03 RX ORDER — LIDOCAINE HYDROCHLORIDE 20 MG/ML
INJECTION, SOLUTION INFILTRATION; PERINEURAL
Status: DISCONTINUED | OUTPATIENT
Start: 2024-10-03 | End: 2024-10-03 | Stop reason: SDUPTHER

## 2024-10-03 RX ORDER — GABAPENTIN 300 MG/1
300 CAPSULE ORAL 3 TIMES DAILY
COMMUNITY

## 2024-10-03 RX ORDER — SIMETHICONE 80 MG
160 TABLET,CHEWABLE ORAL ONCE
Status: COMPLETED | OUTPATIENT
Start: 2024-10-03 | End: 2024-10-03

## 2024-10-03 RX ADMIN — PROPOFOL 50 MG: 10 INJECTION, EMULSION INTRAVENOUS at 07:58

## 2024-10-03 RX ADMIN — SODIUM CHLORIDE: 9 INJECTION, SOLUTION INTRAVENOUS at 07:12

## 2024-10-03 RX ADMIN — PROPOFOL 50 MG: 10 INJECTION, EMULSION INTRAVENOUS at 08:01

## 2024-10-03 RX ADMIN — SIMETHICONE 160 MG: 80 TABLET, CHEWABLE ORAL at 08:48

## 2024-10-03 RX ADMIN — LIDOCAINE HYDROCHLORIDE 60 MG: 20 INJECTION, SOLUTION INFILTRATION; PERINEURAL at 07:53

## 2024-10-03 RX ADMIN — Medication 0.2 MG: at 07:53

## 2024-10-03 RX ADMIN — PROPOFOL 100 MG: 10 INJECTION, EMULSION INTRAVENOUS at 07:53

## 2024-10-03 RX ADMIN — PROPOFOL 50 MG: 10 INJECTION, EMULSION INTRAVENOUS at 08:14

## 2024-10-03 RX ADMIN — PROPOFOL 50 MG: 10 INJECTION, EMULSION INTRAVENOUS at 08:18

## 2024-10-03 RX ADMIN — PROPOFOL 50 MG: 10 INJECTION, EMULSION INTRAVENOUS at 08:08

## 2024-10-03 RX ADMIN — PROPOFOL 50 MG: 10 INJECTION, EMULSION INTRAVENOUS at 07:55

## 2024-10-03 RX ADMIN — PROPOFOL 30 MG: 10 INJECTION, EMULSION INTRAVENOUS at 08:03

## 2024-10-03 RX ADMIN — PROPOFOL 50 MG: 10 INJECTION, EMULSION INTRAVENOUS at 08:12

## 2024-10-03 ASSESSMENT — PAIN - FUNCTIONAL ASSESSMENT
PAIN_FUNCTIONAL_ASSESSMENT: 0-10
PAIN_FUNCTIONAL_ASSESSMENT: 0-10
PAIN_FUNCTIONAL_ASSESSMENT: NONE - DENIES PAIN

## 2024-10-03 NOTE — ANESTHESIA PRE PROCEDURE
PM           Anesthesia Evaluation  Patient summary reviewed and Nursing notes reviewed   no history of anesthetic complications:   Airway: Mallampati: II  TM distance: >3 FB   Neck ROM: full  Mouth opening: > = 3 FB   Dental: normal exam         Pulmonary:Negative Pulmonary ROS and normal exam                               Cardiovascular:  Exercise tolerance: good (>4 METS)  (+) hypertension:, CAD:      ECG reviewed               Beta Blocker:  Not on Beta Blocker         Neuro/Psych:   (+) neuromuscular disease:            GI/Hepatic/Renal:   (+) liver disease:          Endo/Other: Negative Endo/Other ROS             Pt had PAT visit.       Abdominal:             Vascular: negative vascular ROS.         Other Findings:             Anesthesia Plan      MAC     ASA 3       Induction: intravenous.          Plan discussed with attending.                    Nilo Love, APRN - CRNA   10/3/2024

## 2024-10-03 NOTE — ANESTHESIA POSTPROCEDURE EVALUATION
Department of Anesthesiology  Postprocedure Note    Patient: Joanne Masterson  MRN: [id number]  YOB: 1960  Date of evaluation: 10/3/2024    Procedure Summary       Date: 10/03/24 Room / Location: [facility] OR 02 / [facility]    Anesthesia Start: 0739 Anesthesia Stop: 0830    Procedure: ENDOSCOPIC RETROGRADE CHOLANGIOPANCREATOGRAPHY STENT REMOVAL with Biliary Balloon sweep WITH Biliary Duct Biopsies Diagnosis:       Encounter for removal of biliary stent      (Encounter for removal of biliary stent [Z46.89])    Surgeons: Bart Rivers MD Responsible Provider: Nilo Love APRN - CRNA    Anesthesia Type: MAC ASA Status: 3            Anesthesia Type: No value filed.    Nancy Phase I: Nancy Score: 10    Nancy Phase II: Nancy Score: 8    Anesthesia Post Evaluation    Patient location during evaluation: bedside  Patient participation: complete - patient participated  Level of consciousness: awake and awake and alert  Airway patency: patent  Nausea & Vomiting: no nausea and no vomiting  Cardiovascular status: blood pressure returned to baseline and hemodynamically stable  Respiratory status: acceptable  Hydration status: euvolemic  Pain management: adequate        No notable events documented.

## 2024-10-03 NOTE — H&P
Amaya ANGUIANO MD   lisinopril (PRINIVIL;ZESTRIL) 40 MG tablet TAKE 1 TABLET BY MOUTH TWICE DAILY  Patient taking differently: Take 1 tablet by mouth daily TAKE 1 TABLET BY MOUTH DAILY 1/8/23  Yes Brandt Montes MD   doxycycline hyclate (VIBRA-TABS) 100 MG tablet TAKE 1 TABLET BY MOUTH EVERY 12 HOURS FOR 7 DAYS. TAKE WITH FOOD  Patient not taking: Reported on 10/3/2024 8/3/24   Amish Stinson MD   ondansetron (ZOFRAN-ODT) 8 MG TBDP disintegrating tablet Take 1 tablet by mouth every 8 hours as needed for Nausea or Vomiting 8/9/24   Amish Stinson MD   dicyclomine (BENTYL) 10 MG capsule Take 1 capsule by mouth 4 times daily (before meals and nightly)  Patient not taking: Reported on 8/21/2024 8/9/24   Bart Rivers MD   hyoscyamine (LEVSIN/SL) 125 MCG sublingual tablet Place 1 tablet under the tongue every 4 hours as needed (pain)  Patient not taking: Reported on 10/3/2024 7/17/24   Amaya Zavaleta MD   sodium-potassium-mag sulfate (SUPREP) 17.5-3.13-1.6 GM/177ML SOLN solution As directed  Patient not taking: Reported on 7/17/2024 5/30/24   Amaya Zavaleta MD   fluconazole (DIFLUCAN) 150 MG tablet Take 1 tablet by mouth once  Patient not taking: Reported on 5/30/2024 3/8/24   Amish Stinson MD   mupirocin (BACTROBAN) 2 % ointment Apply 1 g topically 3 times daily Apply topically 3 times daily.  Patient not taking: Reported on 10/3/2024 3/8/24   Alex Christianson APRN - CNP   polyethylene glycol (GLYCOLAX) 17 GM/SCOOP powder TAKE 17g BY MOUTH DAILY  Patient not taking: Reported on 5/30/2024 12/9/23   Amish Stinson MD   omeprazole (PRILOSEC) 20 MG delayed release capsule Take 1 capsule by mouth every morning (before breakfast)  Patient not taking: Reported on 5/30/2024 11/8/23   Deb Aguilar APRN - NP     Allergies:   Allergies   Allergen Reactions    Amlodipine Swelling    Lyrica [Pregabalin] Other (See Comments)     PASSING OUT  Passed out    Toprol Xl [Metoprolol] Other  (See Comments) and Swelling     swelling  swelling      History of allergic reaction to anesthesia:  No  Past Medical History:  Past Medical History:   Diagnosis Date    Alcoholic fatty liver 12/10/2014    By  Liver U/S 14    Arthritis     Borderline hyperlipidemia 2014    has never been on meds    Bunion, right foot     Chronic back pain     Chronic neck pain     Degenerative disc disease, lumbar 2014    L5-S1    Dermatitis     Elevated liver enzymes 2014    History of alcohol abuse 12/3/2014    Hypertension     meds > 5 yrs    Hypochloremia 2014    Hypokalemia     Hyponatremia     Lateral epicondylitis 3/14/2016     Updating Deprecated Diagnoses    Lumbar herniated disc     Obesity (BMI 30-39.9) 2014    Pedal edema     Restless leg syndrome     RLS (restless legs syndrome)     Thrombocytosis     Tinea pedis 2014    Toenail fungus     UTI (lower urinary tract infection) 2014     Past Surgical History:  Past Surgical History:   Procedure Laterality Date    BACK SURGERY  2012    Dr. Bravo / [facility] 4 disc OR    BUNIONECTOMY Right 12/15/2016    [facility]  LAPIDUS BUNIONECTOMY     SECTION   &     CHOLECYSTECTOMY, LAPAROSCOPIC  2018    Dr. Pulido    COLONOSCOPY N/A 2024    COLONOSCOPY WITH POLYPECTOMY performed by Amaya Zavaleta MD at [facility]    ERCP N/A 2024    ENDOSCOPIC RETROGRADE CHOLANGIOPANCREATOGRAPHY WITH SPHINCTEROTOMY, BRUSHINGS, BILE DUCT STENTS, PANCREATIC STENT AND BALLOON SWEEP performed by Bart Rivers MD at [facility]    LUMBAR FUSION N/A 2023    L4-5 laminectomy with bilateral foraminotomies, extension of the fusion to L4, revision of posterior lumbar interbody fusion with rods, screws, interbody cages. performed by Lupillo Samaniego DO at [facility] OR    NO INSJ BIOMCHN DEV INTERVERTEBRAL DSC SPC W/ARTHRD N/A 2018    L5-S1 PLIF (POSTERIOR LUMBAR INTERBODY FUSION) performed by Ren Easton MD at [facility]

## 2024-10-04 LAB — CANCER AG19-9 SERPL IA-ACNC: 17 U/ML (ref 0–35)

## 2024-10-05 ASSESSMENT — ENCOUNTER SYMPTOMS
NEUROLOGICAL NEGATIVE: 1
ENDOCRINE NEGATIVE: 1
MUSCULOSKELETAL NEGATIVE: 1
PSYCHIATRIC NEGATIVE: 1
PALPITATIONS: 0
RESPIRATORY NEGATIVE: 1
EYES NEGATIVE: 1
HEMATOLOGIC/LYMPHATIC NEGATIVE: 1
GASTROINTESTINAL NEGATIVE: 1
ALLERGIC/IMMUNOLOGIC NEGATIVE: 1
CONSTITUTIONAL NEGATIVE: 1

## 2024-10-07 ENCOUNTER — OFFICE VISIT (OUTPATIENT)
Dept: FAMILY MEDICINE CLINIC | Age: 64
End: 2024-10-07
Payer: COMMERCIAL

## 2024-10-07 VITALS
HEIGHT: 61 IN | HEART RATE: 91 BPM | DIASTOLIC BLOOD PRESSURE: 84 MMHG | SYSTOLIC BLOOD PRESSURE: 140 MMHG | OXYGEN SATURATION: 95 % | TEMPERATURE: 97.6 F | WEIGHT: 117.6 LBS | BODY MASS INDEX: 22.2 KG/M2

## 2024-10-07 DIAGNOSIS — K58.2 IRRITABLE BOWEL SYNDROME WITH BOTH CONSTIPATION AND DIARRHEA: ICD-10-CM

## 2024-10-07 DIAGNOSIS — K55.9 MESENTERIC ISCHEMIA (HCC): ICD-10-CM

## 2024-10-07 DIAGNOSIS — G89.29 CHRONIC NEUROPATHIC PAIN: Primary | ICD-10-CM

## 2024-10-07 DIAGNOSIS — R21 RASH AND OTHER NONSPECIFIC SKIN ERUPTION: ICD-10-CM

## 2024-10-07 DIAGNOSIS — M79.2 CHRONIC NEUROPATHIC PAIN: Primary | ICD-10-CM

## 2024-10-07 DIAGNOSIS — F42.4 DERMATILLOMANIA IN ADULT: ICD-10-CM

## 2024-10-07 PROCEDURE — 3079F DIAST BP 80-89 MM HG: CPT | Performed by: FAMILY MEDICINE

## 2024-10-07 PROCEDURE — 99215 OFFICE O/P EST HI 40 MIN: CPT | Performed by: FAMILY MEDICINE

## 2024-10-07 PROCEDURE — 3077F SYST BP >= 140 MM HG: CPT | Performed by: FAMILY MEDICINE

## 2024-10-07 RX ORDER — ACYCLOVIR 50 MG/G
OINTMENT TOPICAL
Qty: 30 G | Refills: 1 | Status: SHIPPED | OUTPATIENT
Start: 2024-10-07 | End: 2024-10-14

## 2024-10-07 RX ORDER — GABAPENTIN 300 MG/1
300 CAPSULE ORAL 3 TIMES DAILY
Qty: 90 CAPSULE | Refills: 2 | Status: SHIPPED | OUTPATIENT
Start: 2024-10-07 | End: 2025-01-05

## 2024-10-07 RX ORDER — FLUCONAZOLE 100 MG/1
100 TABLET ORAL DAILY
Qty: 14 TABLET | Refills: 0 | Status: SHIPPED | OUTPATIENT
Start: 2024-10-07 | End: 2024-10-21

## 2024-10-07 SDOH — HEALTH STABILITY: PHYSICAL HEALTH: ON AVERAGE, HOW MANY MINUTES DO YOU ENGAGE IN EXERCISE AT THIS LEVEL?: 0 MIN

## 2024-10-07 SDOH — HEALTH STABILITY: PHYSICAL HEALTH: ON AVERAGE, HOW MANY DAYS PER WEEK DO YOU ENGAGE IN MODERATE TO STRENUOUS EXERCISE (LIKE A BRISK WALK)?: 0 DAYS

## 2024-10-07 NOTE — PROGRESS NOTES
Subjective  Joanne Masterson 1960 is a 64 y.o. female who presents today with:  Chief Complaint   Patient presents with    [facility]     Joanne is a 64 year old female who presents to [facility].   She is a previous patient of Dr. Ashley Licona.     Hypertension     She does not monitor her BP at home. Denies chest pains, palpitations, SOB, dizziness, lightheadedness, headaches or edema. She takes lisinopril 40 mg daily as directed, without adverse effects.     Wound Check     C/O ongoing wound on right forearm x 6 years. States she had initially burned herself with a bag of ice. She has had significant pain that radiates up her arm for the last several years. She was recently prescribed gabapentin for the pain by [facility] urgent care. She reports the gabapentin is a \"miracle drug\". She is almost entirely pain free with the medication. Requesting refills of this.      I have reviewed HPI and agree with above.  She complains of diffuse and chronic pain.  She discusses a time where she had significant ingrown hairs on the arm never growing into her skin that she had to pluck with tweezers.  This is left her with ulcerated sores on her arms that are very painful.  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  She states everything has been much better since she was placed on gabapentin.  She does have chronic nausea and chronic bowel problems.  She has chronic abdominal pain of which she has mesenteric ischemia after review of many of her images.  She was think about canceling her follow-up with a GI specialist because the gabapentin has helped her pain so much.    Review of Systems   All other systems reviewed and are negative.      Past Medical History:   Diagnosis Date    Alcoholic fatty liver 12/10/2014    By  Liver U/S 12/09/14    Arthritis     Borderline hyperlipidemia 11/13/2014    has never been on meds    Bunion, right foot     Chronic back pain     Chronic neck pain

## 2024-10-08 RX ORDER — ONDANSETRON 8 MG/1
8 TABLET, ORALLY DISINTEGRATING ORAL EVERY 8 HOURS PRN
Qty: 30 TABLET | Refills: 0 | Status: SHIPPED | OUTPATIENT
Start: 2024-10-08

## 2024-10-09 ENCOUNTER — TELEPHONE (OUTPATIENT)
Dept: FAMILY MEDICINE CLINIC | Age: 64
End: 2024-10-09

## 2024-10-10 ENCOUNTER — OFFICE VISIT (OUTPATIENT)
Dept: GASTROENTEROLOGY | Age: 64
End: 2024-10-10
Payer: COMMERCIAL

## 2024-10-10 VITALS — HEIGHT: 60 IN | HEART RATE: 89 BPM | OXYGEN SATURATION: 96 % | WEIGHT: 117 LBS | BODY MASS INDEX: 22.97 KG/M2

## 2024-10-10 DIAGNOSIS — R10.13 EPIGASTRIC PAIN: Primary | ICD-10-CM

## 2024-10-10 PROCEDURE — 99213 OFFICE O/P EST LOW 20 MIN: CPT | Performed by: SPECIALIST

## 2024-10-10 ASSESSMENT — ENCOUNTER SYMPTOMS
CONSTIPATION: 0
RESPIRATORY NEGATIVE: 1
ANAL BLEEDING: 0
BLOOD IN STOOL: 0
ABDOMINAL PAIN: 1
ABDOMINAL DISTENTION: 0
VOMITING: 0
EYES NEGATIVE: 1
RECTAL PAIN: 0
DIARRHEA: 0
NAUSEA: 0

## 2024-10-10 NOTE — TELEPHONE ENCOUNTER
have reviewed the request to cover Acyclovir 5 % OINT. (G). The information submitted did not meet the criteria necessary to approve this medication.  Based on the information provided, I am unable to approve coverage for this medication because: There is no indication that your patient has Genital Herpes or Mucocutaneous Herpes Simplex Virus infections. [employer] does not cover the requested drug for any other indication because it is considered experimental, investigational, or unproven.   Zovirax ointment and its generic, acyclovir 5% ointment, are considered medically necessary when one of the following is met (1 or 2): 1. Genital Herpes. Individual meets both of the following (A and B): A. Individual is 18 years of age or older; and B. There is documentation the individual has had an inadequate response, contraindication, or is intolerant to two of the following: acyclovir capsules (200 mg), tablets (400 or 800 mg), or oral suspension (200 mg/5 ml), famciclovir tablets (125 mg, 250 mg, or 500 mg) and/or valacyclovir tablets (500 or 1000 mg); or 2. Mucocutaneous Herpes Simplex Virus infections. Individual meets all of the following (A, B, and C): A. Individual is 18 years [employer]. on behalf of your employer plan of age or older; B. Individual is immunocompromised; and C. There is documentation the individual has had an inadequate response, contraindication, or is intolerant to two of the following: acyclovir capsules (200 mg), tablets (400 or 800 mg), or oral suspension (200 mg/5 ml), famciclovir tablets (125 mg, 250 mg, or 500 mg) and/or valacyclovir tablets (500 or 1000 mg). Topical Antiviral Products are considered medically necessary for continued use when initial criteria are met and there is documentation of beneficial response. Any other use of topical antiviral agents is considered experimental, investigational or unproven, including the following (this list may not be all inclusive)

## 2024-10-10 NOTE — PROGRESS NOTES
Gastroenterology Clinic Follow up Visit    Joanne Masterson  [id number]  Chief Complaint   Patient presents with    Follow-up     6-8 wk f/u       HPI and A/P at last visit summarized below:  Patient is here for follow-up, history of chronic abdominal pain and had extensive evaluation.  Patient had biliary stent placed which was subsequently removed.  Because of persistent epigastric discomfort had Doppler study which showed stenosis and is awaiting IR evaluation.  A gastric emptying scan was also ordered which has not been done yet  Review of Systems   Constitutional: Negative.    HENT: Negative.     Eyes: Negative.    Respiratory: Negative.     Cardiovascular: Negative.    Gastrointestinal:  Positive for abdominal pain. Negative for abdominal distention, anal bleeding, blood in stool, constipation, diarrhea, nausea, rectal pain and vomiting.   Endocrine: Negative.    Genitourinary: Negative.    Musculoskeletal: Negative.    Skin: Negative.    Allergic/Immunologic: Negative for food allergies.   Neurological: Negative.    Hematological: Negative.    Psychiatric/Behavioral: Negative.          Past medical history, past surgical history, medication list, social and familyhistory reviewed    Pulse 89, height 1.524 m (5'), weight 53.1 kg (117 lb), last menstrual period 09/19/2012, SpO2 96%, not currently breastfeeding.    Physical Exam  Constitutional:       Appearance: She is well-developed.   HENT:      Head: Normocephalic and atraumatic.   Eyes:      Conjunctiva/sclera: Conjunctivae normal.      Pupils: Pupils are equal, round, and reactive to light.   Cardiovascular:      Rate and Rhythm: Normal rate.   Pulmonary:      Effort: Pulmonary effort is normal.   Abdominal:      General: Bowel sounds are normal.      Palpations: Abdomen is soft.      Comments: Soft nontender no palpable mass.   Musculoskeletal:         General: Normal range of motion.      Cervical back: Normal range of motion.   Skin:     General: Skin is

## 2024-10-15 DIAGNOSIS — I10 ESSENTIAL HYPERTENSION: ICD-10-CM

## 2024-10-15 NOTE — TELEPHONE ENCOUNTER
Comments:     Last Office Visit (last PCP visit):   8/31/2022    Next Visit Date:  Future Appointments   Date Time Provider Department Center   10/17/2024  2:00 PM Arianna Gray APRN - CNP [facility]   11/7/2024  4:00 PM Palomo Whitaker DO [facility]       **If hasn't been seen in over a year OR hasn't followed up according to last diabetes/ADHD visit, make appointment for patient before sending refill to provider.    Rx requested:  Requested Prescriptions     Pending Prescriptions Disp Refills    lisinopril (PRINIVIL;ZESTRIL) 40 MG tablet 180 tablet 0     Sig: TAKE 1 TABLET BY MOUTH TWICE DAILY

## 2024-10-16 RX ORDER — LISINOPRIL 40 MG/1
TABLET ORAL
Qty: 180 TABLET | Refills: 0 | OUTPATIENT
Start: 2024-10-16

## 2024-10-17 ENCOUNTER — OFFICE VISIT (OUTPATIENT)
Dept: INTERVENTIONAL RADIOLOGY/VASCULAR | Age: 64
End: 2024-10-17

## 2024-10-17 VITALS
BODY MASS INDEX: 22.97 KG/M2 | OXYGEN SATURATION: 91 % | WEIGHT: 117 LBS | SYSTOLIC BLOOD PRESSURE: 138 MMHG | RESPIRATION RATE: 16 BRPM | HEART RATE: 92 BPM | DIASTOLIC BLOOD PRESSURE: 82 MMHG | HEIGHT: 60 IN

## 2024-10-17 DIAGNOSIS — R11.0 NAUSEA: ICD-10-CM

## 2024-10-17 DIAGNOSIS — R10.13 EPIGASTRIC PAIN: Primary | ICD-10-CM

## 2024-10-17 DIAGNOSIS — R63.4 WEIGHT LOSS, UNINTENTIONAL: ICD-10-CM

## 2024-10-17 RX ORDER — ACYCLOVIR 50 MG/G
OINTMENT TOPICAL 3 TIMES DAILY
COMMUNITY
Start: 2024-10-09

## 2024-10-17 RX ORDER — LISINOPRIL 40 MG/1
40 TABLET ORAL DAILY
COMMUNITY
End: 2024-10-17 | Stop reason: SDUPTHER

## 2024-10-17 NOTE — TELEPHONE ENCOUNTER
Another provider please advise. Dr. Whitaker not back in office until Monday.     LV 10/7/2024  NV 11/7/2024    Requested Prescriptions     Pending Prescriptions Disp Refills    lisinopril (PRINIVIL;ZESTRIL) 40 MG tablet 30 tablet      Sig: Take 1 tablet by mouth daily

## 2024-10-17 NOTE — PROGRESS NOTES
unselect if not a suspected or confirmed  emergency medical condition->Emergency Medical Condition (MA)  What reading provider will be dictating this exam?->CRC    FINDINGS:  Evaluation of the solid organs and vascular structures limited without IV  contrast.  Lung bases negative.  No pneumoperitoneum.  Cholecystectomy.  Solid organs unremarkable.  Bowel negative for obstruction or inflammation.  Diverticulosis.  Normal appendix.  Atherosclerotic vascular calcifications.  Tubal ligation material.  Small anterior uterine fibroid.  Postop lumbar  spine.  No acute osseous findings or destructive bone lesions.    Impression  No urgent or emergent process visible by CT imaging.    MRI Result (most recent):  MRI ABDOMEN W WO CONTRAST 07/03/2024    Narrative  EXAMINATION:  MRI OF THE ABDOMEN WITHOUT AND WITH CONTRAST, 7/3/2024 8:18 am    TECHNIQUE:  Multiplanar multisequence MRI of the abdomen was performed without and with  the administration of intravenous contrast.    COMPARISON:  06/02/2024 CT    HISTORY:  ORDERING SYSTEM PROVIDED HISTORY: Abnormal results of liver function studies,  Upper abdominal pain  TECHNOLOGIST PROVIDED HISTORY:  3d post processing  STAT Creatinine as needed:->Yes  What is the sedation requirement?->None  What reading provider will be dictating this exam?->CRC    FINDINGS:  Technical Factors: Arterial phase timing is optimal.    Diffuse Liver Abnormalities: None    Liver Lesion: None    Vascular Abnormalities: None    Bile Ducts: Chronic pattern of biliary dilatation with no evidence of  intraluminal filling defect or stricture.  The common bile duct measures up  to 11 mm.    Gallbladder: Remote history of cholecystectomy.    Other: None    Impression  1. Chronic pattern of biliary dilatation with no evidence of  choledocholithiasis or stricture.  This likely reflects reservoir effect in a  patient with remote cholecystectomy.  2. No other significant finding in the abdomen.        ASSESSMENT

## 2024-10-18 RX ORDER — LISINOPRIL 40 MG/1
40 TABLET ORAL DAILY
Qty: 30 TABLET | Refills: 0 | Status: SHIPPED | OUTPATIENT
Start: 2024-10-18

## 2024-10-24 ENCOUNTER — HOSPITAL ENCOUNTER (OUTPATIENT)
Dept: CT IMAGING | Age: 64
Discharge: HOME | End: 2024-10-26
Payer: COMMERCIAL

## 2024-10-24 DIAGNOSIS — R10.13 EPIGASTRIC PAIN: ICD-10-CM

## 2024-10-24 DIAGNOSIS — R11.0 NAUSEA: ICD-10-CM

## 2024-10-24 DIAGNOSIS — R63.4 WEIGHT LOSS, UNINTENTIONAL: ICD-10-CM

## 2024-10-24 LAB
PERFORMED ON: NORMAL
POC CREATININE: 0.8 MG/DL (ref 0.6–1.2)
POC SAMPLE TYPE: NORMAL

## 2024-10-24 PROCEDURE — 6360000004 HC RX CONTRAST MEDICATION: Performed by: NURSE PRACTITIONER

## 2024-10-24 PROCEDURE — 74174 CTA ABD&PLVS W/CONTRAST: CPT

## 2024-10-24 RX ORDER — IOPAMIDOL 612 MG/ML
100 INJECTION, SOLUTION INTRAVASCULAR
Status: DISCONTINUED | OUTPATIENT
Start: 2024-10-24 | End: 2024-10-24

## 2024-10-24 RX ORDER — IOPAMIDOL 755 MG/ML
100 INJECTION, SOLUTION INTRAVASCULAR
Status: COMPLETED | OUTPATIENT
Start: 2024-10-24 | End: 2024-10-24

## 2024-10-24 RX ADMIN — IOPAMIDOL 100 ML: 755 INJECTION, SOLUTION INTRAVENOUS at 16:10

## 2024-10-30 ENCOUNTER — TELEPHONE (OUTPATIENT)
Dept: INTERVENTIONAL RADIOLOGY/VASCULAR | Age: 64
End: 2024-10-30

## 2024-10-30 NOTE — TELEPHONE ENCOUNTER
Please call patient and move her appointment for CTA results up. She is currently scheduled for 12/10/2024. Can be a virtual visit for results over the phone as well. Thank you.

## 2024-11-04 SDOH — ECONOMIC STABILITY: FOOD INSECURITY: WITHIN THE PAST 12 MONTHS, YOU WORRIED THAT YOUR FOOD WOULD RUN OUT BEFORE YOU GOT MONEY TO BUY MORE.: NEVER TRUE

## 2024-11-04 SDOH — ECONOMIC STABILITY: FOOD INSECURITY: WITHIN THE PAST 12 MONTHS, THE FOOD YOU BOUGHT JUST DIDN'T LAST AND YOU DIDN'T HAVE MONEY TO GET MORE.: NEVER TRUE

## 2024-11-04 SDOH — ECONOMIC STABILITY: INCOME INSECURITY: HOW HARD IS IT FOR YOU TO PAY FOR THE VERY BASICS LIKE FOOD, HOUSING, MEDICAL CARE, AND HEATING?: NOT HARD AT ALL

## 2024-11-07 ENCOUNTER — OFFICE VISIT (OUTPATIENT)
Dept: FAMILY MEDICINE CLINIC | Age: 64
End: 2024-11-07
Payer: COMMERCIAL

## 2024-11-07 ENCOUNTER — TELEPHONE (OUTPATIENT)
Dept: GASTROENTEROLOGY | Age: 64
End: 2024-11-07

## 2024-11-07 VITALS
HEIGHT: 60 IN | SYSTOLIC BLOOD PRESSURE: 126 MMHG | OXYGEN SATURATION: 97 % | TEMPERATURE: 97.1 F | DIASTOLIC BLOOD PRESSURE: 78 MMHG | BODY MASS INDEX: 21.55 KG/M2 | WEIGHT: 109.8 LBS | HEART RATE: 100 BPM

## 2024-11-07 DIAGNOSIS — M79.2 CHRONIC NEUROPATHIC PAIN: Primary | ICD-10-CM

## 2024-11-07 DIAGNOSIS — Z12.31 SCREENING MAMMOGRAM FOR BREAST CANCER: ICD-10-CM

## 2024-11-07 DIAGNOSIS — K58.2 IRRITABLE BOWEL SYNDROME WITH BOTH CONSTIPATION AND DIARRHEA: ICD-10-CM

## 2024-11-07 DIAGNOSIS — I10 ESSENTIAL HYPERTENSION: ICD-10-CM

## 2024-11-07 DIAGNOSIS — R21 RASH AND OTHER NONSPECIFIC SKIN ERUPTION: ICD-10-CM

## 2024-11-07 DIAGNOSIS — G89.29 CHRONIC NEUROPATHIC PAIN: Primary | ICD-10-CM

## 2024-11-07 DIAGNOSIS — F42.4 DERMATILLOMANIA IN ADULT: ICD-10-CM

## 2024-11-07 DIAGNOSIS — M77.12 LEFT LATERAL EPICONDYLITIS: ICD-10-CM

## 2024-11-07 PROCEDURE — 3078F DIAST BP <80 MM HG: CPT | Performed by: FAMILY MEDICINE

## 2024-11-07 PROCEDURE — 3074F SYST BP LT 130 MM HG: CPT | Performed by: FAMILY MEDICINE

## 2024-11-07 PROCEDURE — 99214 OFFICE O/P EST MOD 30 MIN: CPT | Performed by: FAMILY MEDICINE

## 2024-11-07 RX ORDER — ACYCLOVIR 50 MG/G
OINTMENT TOPICAL 3 TIMES DAILY
Qty: 30 G | Refills: 3 | Status: SHIPPED | OUTPATIENT
Start: 2024-11-07

## 2024-11-07 RX ORDER — LISINOPRIL 40 MG/1
40 TABLET ORAL DAILY
Qty: 90 TABLET | Refills: 3 | Status: SHIPPED | OUTPATIENT
Start: 2024-11-07

## 2024-11-07 RX ORDER — GABAPENTIN 300 MG/1
300 CAPSULE ORAL 4 TIMES DAILY
Qty: 120 CAPSULE | Refills: 2 | Status: SHIPPED | OUTPATIENT
Start: 2024-11-07 | End: 2025-02-05

## 2024-11-07 NOTE — PROGRESS NOTES
Medication Sig Start Date End Date Taking? Authorizing Provider   acyclovir (ZOVIRAX) 5 % ointment Apply topically 3 times daily 11/7/24  Yes Palomo Whitaker DO   lisinopril (PRINIVIL;ZESTRIL) 40 MG tablet Take 1 tablet by mouth daily 11/7/24  Yes Palomo Whitaker DO   gabapentin (NEURONTIN) 300 MG capsule Take 1 capsule by mouth 4 times daily for 90 days. Intended supply: 30 days 11/7/24 2/5/25 Yes Palomo Whitaker DO   ondansetron (ZOFRAN-ODT) 8 MG TBDP disintegrating tablet Take 1 tablet by mouth every 8 hours as needed for Nausea or Vomiting 10/8/24  Yes Amaya Zavaleta MD   tiZANidine (ZANAFLEX) 4 MG tablet TAKE 1 TABLET BY MOUTH TWICE DAILY AS NEEDED for SPASM 9/24/24  Yes Kamlesh Bill PA              Objective   Blood pressure (!) 146/86, pulse 100, temperature 97.1 °F (36.2 °C), temperature source Temporal, height 1.524 m (5'), weight 49.8 kg (109 lb 12.8 oz), last menstrual period 09/19/2012, SpO2 97%, not currently breastfeeding.  Physical Exam  Vitals reviewed.   Constitutional:       Appearance: Normal appearance. She is normal weight.   HENT:      Head: Normocephalic and atraumatic.      Right Ear: Tympanic membrane normal.      Left Ear: Tympanic membrane normal.      Nose: Nose normal.      Mouth/Throat:      Mouth: Mucous membranes are dry.   Eyes:      Extraocular Movements: Extraocular movements intact.      Conjunctiva/sclera: Conjunctivae normal.      Pupils: Pupils are equal, round, and reactive to light.   Cardiovascular:      Rate and Rhythm: Normal rate and regular rhythm.      Pulses: Normal pulses.      Heart sounds: Normal heart sounds.   Pulmonary:      Effort: Pulmonary effort is normal.      Breath sounds: Normal breath sounds.   Abdominal:      General: Abdomen is flat. Bowel sounds are normal.      Palpations: Abdomen is soft.   Musculoskeletal:         General: Tenderness present. Injury: with tinels.Normal range of motion.      Cervical back: Normal range of motion and

## 2024-11-07 NOTE — TELEPHONE ENCOUNTER
The patient  wants to know if the patient needs another procedure after her ERCP on 10/3/24. Please avise

## 2024-11-12 NOTE — TELEPHONE ENCOUNTER
Continued clinical observation with repeat LFTs in 3 months, no endoscopic intervention at this time.  Patient's CEA and CA 19-9 noted to be normal which is good news    Further recommendations based on clinical progress and any change in LFTs

## 2024-11-18 DIAGNOSIS — I10 ESSENTIAL HYPERTENSION: ICD-10-CM

## 2024-11-18 RX ORDER — LISINOPRIL 40 MG/1
40 TABLET ORAL DAILY
Qty: 90 TABLET | Refills: 3 | OUTPATIENT
Start: 2024-11-18

## 2024-11-19 ENCOUNTER — HOSPITAL ENCOUNTER (OUTPATIENT)
Dept: NUCLEAR MEDICINE | Age: 64
Discharge: HOME | End: 2024-11-21
Attending: SPECIALIST
Payer: COMMERCIAL

## 2024-11-19 DIAGNOSIS — R10.13 DYSPEPSIA: ICD-10-CM

## 2024-11-19 DIAGNOSIS — R10.13 EPIGASTRIC PAIN: Primary | ICD-10-CM

## 2024-11-19 PROCEDURE — 3430000000 HC RX DIAGNOSTIC RADIOPHARMACEUTICAL: Performed by: SPECIALIST

## 2024-11-19 PROCEDURE — A9541 TC99M SULFUR COLLOID: HCPCS | Performed by: SPECIALIST

## 2024-11-19 PROCEDURE — 78264 GASTRIC EMPTYING IMG STUDY: CPT

## 2024-11-19 RX ORDER — TECHNETIUM TC 99M SULFUR COLLOID 2 MG
2 KIT MISCELLANEOUS ONCE
Status: COMPLETED | OUTPATIENT
Start: 2024-11-19 | End: 2024-11-19

## 2024-11-19 RX ADMIN — Medication 1.9 MILLICURIE: at 08:00

## 2024-11-19 NOTE — TELEPHONE ENCOUNTER
The patient was informed to have her labs done in 3 mo with Dr. Zavaleta and to schedule a follow up visit.

## 2024-12-04 ENCOUNTER — TELEPHONE (OUTPATIENT)
Dept: GASTROENTEROLOGY | Age: 64
End: 2024-12-04

## 2024-12-04 NOTE — TELEPHONE ENCOUNTER
Patient called asking if you can call with Gastric Emptying results. Patient can be reached at [phone]

## 2024-12-16 NOTE — TELEPHONE ENCOUNTER
Requested Prescriptions     Pending Prescriptions Disp Refills    tiZANidine (ZANAFLEX) 4 MG tablet 60 tablet 0     Sig: Take 1 tablet by mouth every 8 hours as needed (muscle spasm)

## 2025-01-28 ENCOUNTER — OFFICE VISIT (OUTPATIENT)
Age: 65
End: 2025-01-28
Payer: COMMERCIAL

## 2025-01-28 VITALS
DIASTOLIC BLOOD PRESSURE: 66 MMHG | BODY MASS INDEX: 21.4 KG/M2 | OXYGEN SATURATION: 99 % | WEIGHT: 109 LBS | RESPIRATION RATE: 16 BRPM | SYSTOLIC BLOOD PRESSURE: 138 MMHG | HEIGHT: 60 IN | HEART RATE: 90 BPM | TEMPERATURE: 98 F

## 2025-01-28 DIAGNOSIS — R21 RASH AND OTHER NONSPECIFIC SKIN ERUPTION: ICD-10-CM

## 2025-01-28 DIAGNOSIS — R07.89 CHRONIC CHEST WALL PAIN: Primary | ICD-10-CM

## 2025-01-28 DIAGNOSIS — M79.2 CHRONIC NEUROPATHIC PAIN: ICD-10-CM

## 2025-01-28 DIAGNOSIS — G89.29 CHRONIC NEUROPATHIC PAIN: ICD-10-CM

## 2025-01-28 DIAGNOSIS — G89.29 CHRONIC CHEST WALL PAIN: Primary | ICD-10-CM

## 2025-01-28 PROCEDURE — 3078F DIAST BP <80 MM HG: CPT | Performed by: FAMILY MEDICINE

## 2025-01-28 PROCEDURE — 99214 OFFICE O/P EST MOD 30 MIN: CPT | Performed by: FAMILY MEDICINE

## 2025-01-28 PROCEDURE — 3075F SYST BP GE 130 - 139MM HG: CPT | Performed by: FAMILY MEDICINE

## 2025-01-28 RX ORDER — COLCHICINE 0.6 MG/1
0.6 TABLET ORAL 2 TIMES DAILY
Qty: 60 TABLET | Refills: 3 | Status: SHIPPED | OUTPATIENT
Start: 2025-01-28

## 2025-01-28 RX ORDER — BACLOFEN 10 MG/1
10 TABLET ORAL 3 TIMES DAILY
Qty: 20 TABLET | Refills: 0 | Status: SHIPPED | OUTPATIENT
Start: 2025-01-28

## 2025-01-28 RX ORDER — GABAPENTIN 300 MG/1
300 CAPSULE ORAL
Qty: 180 CAPSULE | Refills: 2 | Status: SHIPPED | OUTPATIENT
Start: 2025-01-28 | End: 2025-04-28

## 2025-01-28 RX ORDER — ACYCLOVIR 50 MG/G
OINTMENT TOPICAL 3 TIMES DAILY
Qty: 30 G | Refills: 3 | Status: SHIPPED | OUTPATIENT
Start: 2025-01-28

## 2025-01-28 SDOH — ECONOMIC STABILITY: FOOD INSECURITY: WITHIN THE PAST 12 MONTHS, YOU WORRIED THAT YOUR FOOD WOULD RUN OUT BEFORE YOU GOT MONEY TO BUY MORE.: NEVER TRUE

## 2025-01-28 SDOH — ECONOMIC STABILITY: FOOD INSECURITY: WITHIN THE PAST 12 MONTHS, THE FOOD YOU BOUGHT JUST DIDN'T LAST AND YOU DIDN'T HAVE MONEY TO GET MORE.: NEVER TRUE

## 2025-01-28 ASSESSMENT — PATIENT HEALTH QUESTIONNAIRE - PHQ9
1. LITTLE INTEREST OR PLEASURE IN DOING THINGS: NOT AT ALL
SUM OF ALL RESPONSES TO PHQ QUESTIONS 1-9: 0
SUM OF ALL RESPONSES TO PHQ9 QUESTIONS 1 & 2: 0
SUM OF ALL RESPONSES TO PHQ QUESTIONS 1-9: 0
2. FEELING DOWN, DEPRESSED OR HOPELESS: NOT AT ALL
SUM OF ALL RESPONSES TO PHQ QUESTIONS 1-9: 0
SUM OF ALL RESPONSES TO PHQ QUESTIONS 1-9: 0

## 2025-01-28 NOTE — PROGRESS NOTES
Joanne Masterson (:  1960) is a 64 y.o. female, Established patient, here for evaluation of the following chief complaint(s):  Sternum Pain (Patient is here with c/o sternum pain. States she is having pain when trying to wear a bra. Patient states she has major back surgery done 2023. She states she took herself off of the Percocet and started having the pain again. Her pain doctor told her to see Gastro and states she has been seeing them for 7 months. Patient states the pain is shooting and rates the pain as 10/10 when wearing a bra, today she is at an 8/10.)          Subjective   History of Present Illness  The patient presents for evaluation of chest pain. She is accompanied by her .    She reports experiencing severe chest pain, which has been ongoing for several years. The pain is so intense that it prevents her from wearing a bra, leading to significant discomfort and emotional distress. She describes the pain as radiating from the front to the back of her chest, with a constant ache in her back. The pain is predominantly on the left side. She has not sought pain management due to concurrent stomach issues, which have since resolved. She reports no known injury to her chest wall. She has undergone extensive testing for her pancreas and gallbladder, all of which have returned normal results.    She also reports a squishy sensation in her arm, accompanied by shooting pain extending up to her neck. This pain intensifies when the effects of gabapentin wear off. She has been applying an antiviral ointment once daily, which she finds beneficial, but is unsure if she has any refills left. She has been taking gabapentin every 6 hours, but notes that the pain recurs within 4 to 5 hours.    Supplemental Information  She had back surgery in 2023 and was on Percocet, then switched to tramadol. She got off tramadol and the pain is back. She has had this pain for 3 to 4 years. She

## 2025-02-03 RX ORDER — ONDANSETRON 8 MG/1
8 TABLET, ORALLY DISINTEGRATING ORAL EVERY 8 HOURS PRN
Qty: 30 TABLET | Refills: 0 | Status: SHIPPED | OUTPATIENT
Start: 2025-02-03

## 2025-02-10 ENCOUNTER — PATIENT MESSAGE (OUTPATIENT)
Age: 65
End: 2025-02-10

## 2025-02-10 ENCOUNTER — TELEPHONE (OUTPATIENT)
Age: 65
End: 2025-02-10

## 2025-02-10 DIAGNOSIS — G89.29 CHRONIC CHEST WALL PAIN: ICD-10-CM

## 2025-02-10 DIAGNOSIS — R07.89 CHRONIC CHEST WALL PAIN: ICD-10-CM

## 2025-02-10 NOTE — TELEPHONE ENCOUNTER
PT spouse called- she has been taking  Colchicine and this is not helping. She is wanting to see if something else can be called in

## 2025-02-11 NOTE — TELEPHONE ENCOUNTER
1st attempt to reach patient. Called patient at [phone] and left message for patient to call office back at [phone].

## 2025-02-20 ENCOUNTER — OFFICE VISIT (OUTPATIENT)
Age: 65
End: 2025-02-20
Payer: COMMERCIAL

## 2025-02-20 VITALS
OXYGEN SATURATION: 97 % | DIASTOLIC BLOOD PRESSURE: 72 MMHG | TEMPERATURE: 97.3 F | HEART RATE: 94 BPM | SYSTOLIC BLOOD PRESSURE: 134 MMHG | HEIGHT: 60 IN | BODY MASS INDEX: 20.81 KG/M2 | WEIGHT: 106 LBS

## 2025-02-20 DIAGNOSIS — F33.1 MODERATE RECURRENT MAJOR DEPRESSION (HCC): Primary | ICD-10-CM

## 2025-02-20 DIAGNOSIS — M25.522 LEFT ELBOW PAIN: ICD-10-CM
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
## 2025-02-20 DIAGNOSIS — S51.012A SKIN TEAR OF LEFT ELBOW WITHOUT COMPLICATION, INITIAL ENCOUNTER: ICD-10-CM

## 2025-02-20 DIAGNOSIS — R10.9 ABDOMINAL CRAMPING: ICD-10-CM

## 2025-02-20 PROCEDURE — 81003 URINALYSIS AUTO W/O SCOPE: CPT | Performed by: FAMILY MEDICINE

## 2025-02-20 PROCEDURE — 3075F SYST BP GE 130 - 139MM HG: CPT | Performed by: FAMILY MEDICINE

## 2025-02-20 PROCEDURE — 3078F DIAST BP <80 MM HG: CPT | Performed by: FAMILY MEDICINE

## 2025-02-20 PROCEDURE — 99214 OFFICE O/P EST MOD 30 MIN: CPT | Performed by: FAMILY MEDICINE

## 2025-02-20 RX ORDER — DESVENLAFAXINE 25 MG/1
25 TABLET, EXTENDED RELEASE ORAL DAILY
Qty: 30 TABLET | Refills: 2 | Status: SHIPPED | OUTPATIENT
Start: 2025-02-20

## 2025-02-20 RX ORDER — DOXYCYCLINE HYCLATE 100 MG
TABLET ORAL
COMMUNITY
Start: 2024-08-03

## 2025-02-20 NOTE — PROGRESS NOTES
Joanne Masterson (:  1960) is a 64 y.o. female, Established patient, here for evaluation of the following chief complaint(s):  Bleeding/Bruising (Patient states she has had a bruise on left elbow. Patient was informed of bone spurs, tennis elbow, carpal tunnel. Patient states bruise appears on own, no injury related. Patient states she has pain on inside of left elbow. ) and Fall (Patient states last week she fell into wall, hitting elbow. Patient states left elbow took most of fall, breaking open skin and caused moderate bleeding. Patient denies clotting. Patient did not seek medical attention, has been using band aids to cover wound. Patient states when changing band aids, site is still bleeding. Patient denies signs of swelling, inflammation, swelling. Patient is not taking blood thinners. )          Subjective   History of Present Illness  The patient presents for evaluation of elbow pain, depression, and chest pain. She is accompanied by her .    Last week, she experienced a fall that resulted in direct impact to her elbow against a wall, causing a significant bruise. Subsequently, she stumbled in the bathroom, further injuring the same elbow and causing a skin tear. The bruise has been present for several months, raising concerns about its persistence. She reports severe pain in the elbow, which limits her hand and finger mobility. She also experiences radiating pain down to her fingers. A wristband previously used for tennis elbow exacerbates her discomfort. She was previously diagnosed with bone spurs in her elbow.    She has been experiencing severe diarrhea for the past 2 days, necessitating the use of diapers. She attributes this to the colchicine prescribed for her chest pain. She discontinued both colchicine and baclofen due to perceived ineffectiveness but resumed them upon her 's insistence. She had previously tolerated baclofen without adverse effects.    She has been

## 2025-02-21 DIAGNOSIS — R82.998 LEUKOCYTES IN URINE: Primary | ICD-10-CM

## 2025-02-21 LAB
BILIRUBIN, POC: ABNORMAL
BLOOD URINE, POC: ABNORMAL
CLARITY, POC: CLEAR
COLOR, POC: YELLOW
GLUCOSE URINE, POC: ABNORMAL MG/DL
KETONES, POC: ABNORMAL MG/DL
LEUKOCYTE EST, POC: ABNORMAL
NITRITE, POC: ABNORMAL
PH, POC: 6.5
PROTEIN, POC: ABNORMAL MG/DL
SPECIFIC GRAVITY, POC: 1.01
UROBILINOGEN, POC: ABNORMAL MG/DL

## 2025-02-23 LAB
BACTERIA UR CULT: ABNORMAL
BACTERIA UR CULT: ABNORMAL
ORGANISM: ABNORMAL

## 2025-03-04 RX ORDER — CEPHALEXIN 500 MG/1
500 CAPSULE ORAL 2 TIMES DAILY
Qty: 14 CAPSULE | Refills: 0 | Status: SHIPPED | OUTPATIENT
Start: 2025-03-04 | End: 2025-03-11

## 2025-03-17 ENCOUNTER — PATIENT MESSAGE (OUTPATIENT)
Age: 65
End: 2025-03-17

## 2025-03-17 DIAGNOSIS — F33.1 MODERATE RECURRENT MAJOR DEPRESSION (HCC): ICD-10-CM

## 2025-03-18 RX ORDER — DESVENLAFAXINE 50 MG/1
50 TABLET, FILM COATED, EXTENDED RELEASE ORAL DAILY
Qty: 30 TABLET | Refills: 2 | Status: SHIPPED | OUTPATIENT
Start: 2025-03-18

## 2025-05-07 ENCOUNTER — OFFICE VISIT (OUTPATIENT)
Age: 65
End: 2025-05-07
Payer: COMMERCIAL

## 2025-05-07 ENCOUNTER — TELEPHONE (OUTPATIENT)
Age: 65
End: 2025-05-07

## 2025-05-07 VITALS
TEMPERATURE: 98 F | HEART RATE: 79 BPM | OXYGEN SATURATION: 98 % | SYSTOLIC BLOOD PRESSURE: 132 MMHG | HEIGHT: 60 IN | BODY MASS INDEX: 24.11 KG/M2 | DIASTOLIC BLOOD PRESSURE: 82 MMHG | WEIGHT: 122.8 LBS

## 2025-05-07 DIAGNOSIS — E03.9 HYPOTHYROIDISM, UNSPECIFIED TYPE: ICD-10-CM

## 2025-05-07 DIAGNOSIS — M25.812 IMPINGEMENT OF LEFT SHOULDER: ICD-10-CM

## 2025-05-07 DIAGNOSIS — Z12.31 SCREENING MAMMOGRAM FOR BREAST CANCER: ICD-10-CM

## 2025-05-07 DIAGNOSIS — Z00.00 ROUTINE GENERAL MEDICAL EXAMINATION AT A HEALTH CARE FACILITY: ICD-10-CM

## 2025-05-07 DIAGNOSIS — G89.29 CHRONIC NEUROPATHIC PAIN: ICD-10-CM

## 2025-05-07 DIAGNOSIS — R73.01 IMPAIRED FASTING GLUCOSE: ICD-10-CM

## 2025-05-07 DIAGNOSIS — Z00.00 ROUTINE GENERAL MEDICAL EXAMINATION AT A HEALTH CARE FACILITY: Primary | ICD-10-CM

## 2025-05-07 DIAGNOSIS — M79.2 CHRONIC NEUROPATHIC PAIN: ICD-10-CM

## 2025-05-07 LAB
ALBUMIN SERPL-MCNC: 4.5 G/DL (ref 3.5–4.6)
ALP SERPL-CCNC: 87 U/L (ref 40–130)
ALT SERPL-CCNC: 17 U/L (ref 0–33)
ANION GAP SERPL CALCULATED.3IONS-SCNC: 10 MEQ/L (ref 9–15)
AST SERPL-CCNC: 20 U/L (ref 0–35)
BASOPHILS # BLD: 0 K/UL (ref 0–0.2)
BASOPHILS NFR BLD: 0.5 %
BILIRUB SERPL-MCNC: <0.2 MG/DL (ref 0.2–0.7)
BUN SERPL-MCNC: 16 MG/DL (ref 8–23)
CALCIUM SERPL-MCNC: 9.5 MG/DL (ref 8.5–9.9)
CHLORIDE SERPL-SCNC: 105 MEQ/L (ref 95–107)
CHOLEST SERPL-MCNC: 226 MG/DL (ref 0–199)
CO2 SERPL-SCNC: 23 MEQ/L (ref 20–31)
CREAT SERPL-MCNC: 0.68 MG/DL (ref 0.5–0.9)
EOSINOPHIL # BLD: 0.2 K/UL (ref 0–0.7)
EOSINOPHIL NFR BLD: 2.4 %
ERYTHROCYTE [DISTWIDTH] IN BLOOD BY AUTOMATED COUNT: 11.4 % (ref 11.5–14.5)
GLOBULIN SER CALC-MCNC: 2.5 G/DL (ref 2.3–3.5)
GLUCOSE FASTING: 93 MG/DL (ref 70–99)
HCT VFR BLD AUTO: 38.1 % (ref 37–47)
HDLC SERPL-MCNC: 92 MG/DL (ref 40–59)
HGB BLD-MCNC: 12.8 G/DL (ref 12–16)
LDL CHOLESTEROL: 115 MG/DL (ref 0–129)
LYMPHOCYTES # BLD: 2 K/UL (ref 1–4.8)
LYMPHOCYTES NFR BLD: 32.4 %
MCH RBC QN AUTO: 33.1 PG (ref 27–31.3)
MCHC RBC AUTO-ENTMCNC: 33.6 % (ref 33–37)
MCV RBC AUTO: 98.4 FL (ref 79.4–94.8)
MONOCYTES # BLD: 0.6 K/UL (ref 0.2–0.8)
MONOCYTES NFR BLD: 9.3 %
NEUTROPHILS # BLD: 3.4 K/UL (ref 1.4–6.5)
NEUTS SEG NFR BLD: 55.2 %
PLATELET # BLD AUTO: 296 K/UL (ref 130–400)
POTASSIUM SERPL-SCNC: 4.7 MEQ/L (ref 3.4–4.9)
PROT SERPL-MCNC: 7 G/DL (ref 6.3–8)
RBC # BLD AUTO: 3.87 M/UL (ref 4.2–5.4)
SODIUM SERPL-SCNC: 138 MEQ/L (ref 135–144)
TRIGLYCERIDE, FASTING: 97 MG/DL (ref 0–150)
TSH REFLEX: 2.16 UIU/ML (ref 0.44–3.86)
WBC # BLD AUTO: 6.1 K/UL (ref 4.8–10.8)

## 2025-05-07 PROCEDURE — 3079F DIAST BP 80-89 MM HG: CPT | Performed by: FAMILY MEDICINE

## 2025-05-07 PROCEDURE — 99396 PREV VISIT EST AGE 40-64: CPT | Performed by: FAMILY MEDICINE

## 2025-05-07 PROCEDURE — 3075F SYST BP GE 130 - 139MM HG: CPT | Performed by: FAMILY MEDICINE

## 2025-05-07 RX ORDER — GABAPENTIN 300 MG/1
600 CAPSULE ORAL 4 TIMES DAILY
Qty: 360 CAPSULE | Refills: 2 | Status: SHIPPED | OUTPATIENT
Start: 2025-05-07 | End: 2025-09-19

## 2025-05-07 NOTE — PROGRESS NOTES
Skin is warm and dry.   Neurological:      General: No focal deficit present.      Mental Status: She is alert.   Psychiatric:         Mood and Affect: Mood normal.         Behavior: Behavior normal.         Thought Content: Thought content normal.         Judgment: Judgment normal.          Results         Assessment & Plan  1. Left arm pain.  - The patient's rotator cuff is currently intact; however, it becomes impinged during use, leading to pain.  - The pain radiating up her shoulder, neck, and face may originate from carpal tunnel syndrome.  - A nerve conduction study will be conducted on both the left and right arms for comparison. An x-ray of the left shoulder will be obtained to assess for any surgical indications.  - She is advised to continue with home exercises three times a week and to use the hula hoop around her hips instead of her arms. The dosage of gabapentin will be increased to 600 mg, administered four times daily, with the option to increase to six times daily if necessary. She is instructed to keep the skin moisturized and may continue using Aspercreme.    2. Abdominal pain.  - She reports persistent abdominal pain that worsens with movement.  - The current gabapentin regimen appears to be less effective.  - The dosage of gabapentin will be increased to 600 mg, administered four times daily, with the option to increase to six times daily if necessary.    Follow-up  The patient will follow up in 6 months.  1. Routine general medical examination at a health care facility  -     CBC with Auto Differential; Future  -     Comprehensive Metabolic Panel, Fasting; Future  -     Lipid, Fasting; Future  2. Impaired fasting glucose  -     Hemoglobin A1C; Future  3. Hypothyroidism, unspecified type  -     TSH reflex to FT4; Future  4. Chronic neuropathic pain  -     gabapentin (NEURONTIN) 300 MG capsule; Take 2 capsules by mouth 4 times daily for 135 days. Intended supply: 30 days, Disp-360 capsule,

## 2025-05-08 ENCOUNTER — RESULTS FOLLOW-UP (OUTPATIENT)
Age: 65
End: 2025-05-08

## 2025-05-08 LAB
ESTIMATED AVERAGE GLUCOSE: 91 MG/DL
HBA1C MFR BLD: 4.8 % (ref 4–6)

## 2025-05-14 ENCOUNTER — PATIENT MESSAGE (OUTPATIENT)
Age: 65
End: 2025-05-14

## 2025-05-14 DIAGNOSIS — F33.1 MODERATE RECURRENT MAJOR DEPRESSION (HCC): ICD-10-CM

## 2025-05-19 RX ORDER — DESVENLAFAXINE 100 MG/1
100 TABLET, EXTENDED RELEASE ORAL DAILY
Qty: 90 TABLET | Refills: 1 | Status: SHIPPED | OUTPATIENT
Start: 2025-05-19

## 2025-06-12 ENCOUNTER — PATIENT MESSAGE (OUTPATIENT)
Age: 65
End: 2025-06-12

## 2025-06-12 DIAGNOSIS — G89.29 CHRONIC NEUROPATHIC PAIN: ICD-10-CM

## 2025-06-12 DIAGNOSIS — M79.2 CHRONIC NEUROPATHIC PAIN: ICD-10-CM

## 2025-06-13 RX ORDER — GABAPENTIN 400 MG/1
800 CAPSULE ORAL 4 TIMES DAILY
Qty: 360 CAPSULE | Refills: 2 | Status: SHIPPED | OUTPATIENT
Start: 2025-06-13 | End: 2025-10-26

## 2025-07-02 NOTE — TELEPHONE ENCOUNTER
Patient's Last Office Visit  5/7/2025     Patient's Next Visit  Future Appointments   Date Time Provider Department Center   7/16/2025 11:00 AM [facility]   11/10/2025  3:30 PM Palomo Whitaker DO [facility]

## 2025-07-16 ENCOUNTER — HOSPITAL ENCOUNTER (OUTPATIENT)
Dept: NEUROLOGY | Age: 65
Discharge: HOME | End: 2025-07-16
Payer: COMMERCIAL

## 2025-07-16 PROCEDURE — 95911 NRV CNDJ TEST 9-10 STUDIES: CPT

## 2025-07-16 PROCEDURE — 95886 MUSC TEST DONE W/N TEST COMP: CPT

## 2025-08-06 ENCOUNTER — PATIENT MESSAGE (OUTPATIENT)
Age: 65
End: 2025-08-06

## 2025-08-06 DIAGNOSIS — R29.2 HYPERREFLEXIA: Primary | ICD-10-CM

## 2025-08-06 DIAGNOSIS — M54.12 CERVICAL NERVE ROOT DISORDER: ICD-10-CM

## (undated) DEVICE — TUBING, SUCTION, 9/32" X 20', STRAIGHT: Brand: MEDLINE INDUSTRIES, INC.

## (undated) DEVICE — ELECTRODE PT RET AD L9FT HI MOIST COND ADH HYDRGEL CORDED

## (undated) DEVICE — DRAIN SURG 10FR 100% SIL RND END PERF W/ TRCR

## (undated) DEVICE — KIT COLLCTN L2.5IN OD1.8IN BONE DUST S STL DISP SHEEHY MESH

## (undated) DEVICE — LABEL MED MINI W/ MARKER

## (undated) DEVICE — GLOVE SURG SZ 75 L12IN FNGR THK83MIL CRM POLYISOPRENE

## (undated) DEVICE — FORCEPS BX L240CM JAW DIA2.8MM L CAP W/ NDL MIC MESH TOOTH

## (undated) DEVICE — SHEET,DRAPE,53X77,STERILE: Brand: MEDLINE

## (undated) DEVICE — SYRINGE, LUER SLIP, STERILE, 10ML: Brand: MEDLINE

## (undated) DEVICE — SPONGE GZ W4XL4IN COT 12 PLY TYP VII WVN C FLD DSGN STERILE

## (undated) DEVICE — SYSTEM BX CAP BILI RAP EXCHG CAP LOK DEV COMPATIBLE W/ OLY

## (undated) DEVICE — INSTRUMENT BATTERY

## (undated) DEVICE — TRAP,MUCUS SPECIMEN, 80CC: Brand: MEDLINE

## (undated) DEVICE — RETRIEVAL BALLOON CATHETER: Brand: EXTRACTOR™ PRO RX-S

## (undated) DEVICE — TUBING, SUCTION, 1/4" X 10', STRAIGHT: Brand: MEDLINE

## (undated) DEVICE — CONTAINER,SPECIMEN,OR STERILE,4OZ: Brand: MEDLINE

## (undated) DEVICE — SYRINGE IRRIG 60ML SFT PLIABLE BLB EZ TO GRP 1 HND USE W/

## (undated) DEVICE — Device: Brand: ENDO SMARTCAP

## (undated) DEVICE — SNAP KOVER: Brand: UNBRANDED

## (undated) DEVICE — TRAP POLYP BALEEN

## (undated) DEVICE — 3.0MM PRECISION NEURO (MATCH HEAD)

## (undated) DEVICE — CODMAN® SURGICAL PATTIES 1/4" X 1/4" (0.64CM X 0.64CM): Brand: CODMAN®

## (undated) DEVICE — GAUZE,SPONGE,4"X4",16PLY,XRAY,STRL,LF: Brand: MEDLINE

## (undated) DEVICE — DRESSING ANTIMIC FOAM MEPILEX BORD POSTOP AG PROD SZ 4X12 IN

## (undated) DEVICE — PATIENT RETURN ELECTRODE, SINGLE-USE, CONTACT QUALITY MONITORING, ADULT, WITH 9FT CORD, FOR PATIENTS WEIGING OVER 33LBS. (15KG): Brand: MEGADYNE

## (undated) DEVICE — KIT DRN FLAT W/ 100CC EVAC 10MM FULL PERF

## (undated) DEVICE — COVER,TABLE,44X90,STERILE: Brand: MEDLINE

## (undated) DEVICE — 3M™ STERI-DRAPE™ INSTRUMENT POUCH 1018: Brand: STERI-DRAPE™

## (undated) DEVICE — FRAZIER SUCTION INSTRUMENT 12 FR W/CONTROL VENT & OBTURATOR: Brand: FRAZIER

## (undated) DEVICE — ELECTRODE BLDE L4IN NONINSULATED EDGE

## (undated) DEVICE — CONMED SCOPE SAVER BITE BLOCK, 20X27 MM: Brand: SCOPE SAVER

## (undated) DEVICE — SINGLE PORT MANIFOLD: Brand: NEPTUNE 2

## (undated) DEVICE — CARBIDE MATCH HEAD

## (undated) DEVICE — TUBE SET 96 MM 64 MM H2O PERISTALTIC STD AUX CHANNEL

## (undated) DEVICE — E-Z CLEAN, NON-STICK, PTFE COATED, ELECTROSURGICAL BLADE ELECTRODE, 6.5 INCH (16.5 CM): Brand: MEGADYNE

## (undated) DEVICE — GLOVE SURG SZ 8 CRM LTX FREE POLYISOPRENE POLYMER BEAD ANTI

## (undated) DEVICE — CANNULA PERF L2IN BLNT TIP 3MM VES CLR RADPQ BODY FEM LUER D

## (undated) DEVICE — ASPIRATION/ANTICOAGULATION SET: Brand: HAEMONETICS CELL SAVER SYSTEM

## (undated) DEVICE — GLOVE ORANGE PI 8   MSG9080

## (undated) DEVICE — COUNTER NDL 40 COUNT HLD 70 FOAM BLK ADH W/ MAG

## (undated) DEVICE — DRAPE C ARM W41XL74IN UNIV MOB W RUBBERBAND CLP

## (undated) DEVICE — SPONGE,LAP,18"X18",DLX,XR,ST,5/PK,40/PK: Brand: MEDLINE

## (undated) DEVICE — THE MILL DISPOSABLE - MEDIUM

## (undated) DEVICE — NEEDLE BNE ACCS SZ 3 11GA DMND FOR VERTPLSTY EXPR FRAC

## (undated) DEVICE — GLOVE SURG SZ 8 L12IN FNGR THK83MIL CRM POLYISOPRENE

## (undated) DEVICE — RESERVOIR,HARDSHELL,150 Μ_ NOTE: ADDITIONAL PACKAGING DI: 20812747016039, UNIT PACKAGING, CONTAINS (1) 10812747016032 DI:30812747016036, CARTON CONTAINS (4) 20812747016039: Brand: HAEMONETICS CELL SAVER SYSTEM

## (undated) DEVICE — 3M™ IOBAN™ 2 ANTIMICROBIAL INCISE DRAPE 6650EZ: Brand: IOBAN™ 2

## (undated) DEVICE — SYRINGE MED 50ML LUERLOCK TIP

## (undated) DEVICE — MARKER SURG SKIN GENTIAN VLT REG TIP W/ 6IN RUL

## (undated) DEVICE — INSERT CUSHION HEAD PRONEVIEW

## (undated) DEVICE — ELECTROSURGICAL PENCIL BUTTON SWITCH E-Z CLEAN COATED BLADE ELECTRODE 10 FT (3 M) CORD HOLSTER: Brand: MEGADYNE

## (undated) DEVICE — TOWEL,OR,DSP,ST,BLUE,STD,4/PK,20PK/CS: Brand: MEDLINE

## (undated) DEVICE — SUTURE VCRL SZ 0 L36IN ABSRB VLT L36MM CT-1 1/2 CIR J346H

## (undated) DEVICE — GOWN,AURORA,NONREINFORCED,LARGE: Brand: MEDLINE

## (undated) DEVICE — BRUSH ENDO CLN L90.5IN SHTH DIA1.7MM BRIST DIA5-7MM 2-6MM

## (undated) DEVICE — RX PUSHER: Brand: NAVIFLEX™ RX PUSHER

## (undated) DEVICE — GOWN,AURORA,NONRNF,XL,30/CS: Brand: MEDLINE

## (undated) DEVICE — 1010 S-DRAPE TOWEL DRAPE 10/BX: Brand: STERI-DRAPE™

## (undated) DEVICE — SUTURE VCRL SZ 2-0 L36IN ABSRB VLT L36MM CT-1 1/2 CIR J345H

## (undated) DEVICE — 3 ML SYRINGE LUER-LOCK TIP: Brand: MONOJECT

## (undated) DEVICE — GAUZE,SPONGE,4"X4",12PLY,STERILE,LF,2'S: Brand: MEDLINE

## (undated) DEVICE — NEURO: Brand: MEDLINE INDUSTRIES, INC.

## (undated) DEVICE — SUTURE VCRL SZ 3-0 L27IN ABSRB VLT CT-2 L26MM 1/2 CIR J332H

## (undated) DEVICE — WIREGUIDED CYTOLOGY BRUSH: Brand: RX CYTOLOGY BRUSH

## (undated) DEVICE — SPONGE,NEURO,1"X3",XR,STRL,LF,10/PK: Brand: MEDLINE

## (undated) DEVICE — SPHINCTEROTOME: Brand: DREAMTOME™ RX 44

## (undated) DEVICE — ENDO CARRY-ON PROCEDURE KIT: Brand: ENDO CARRY-ON PROCEDURE KIT

## (undated) DEVICE — SUTURE VCRL SZ 0 L27IN ABSRB VLT L48MM CTX 1/2 CIR TAPR PNT J364H

## (undated) DEVICE — BIPOLAR SEALER 23-113-1 AQM 2.3 OM NEURO: Brand: AQUAMANTYS ®

## (undated) DEVICE — CODMAN® SURGICAL PATTIES 1/2" X1 1/2" (1.27CM X 3.81CM): Brand: CODMAN®

## (undated) DEVICE — CHLORAPREP 26ML ORANGE

## (undated) DEVICE — JELLY,LUBE,STERILE,FLIP TOP,TUBE,2-OZ: Brand: MEDLINE

## (undated) DEVICE — OIL CARTRIDGE: Brand: CORE, MAESTRO

## (undated) DEVICE — BIPOLAR IRRIGATOR INTEGRATED TUBING AND BIPOLAR CORD SET, DISPOSABLE

## (undated) DEVICE — GUIDEWIRE VASC L260CM TIP L5CM 0.25FR STR RND TIP TUNGSTEN

## (undated) DEVICE — TUBING IRRIGATION 140/160/180/190 SER GI ENDOSCP SMARTCAP

## (undated) DEVICE — DIFFUSER: Brand: CORE, MAESTRO

## (undated) DEVICE — SYRINGE, LUER LOCK, 10ML: Brand: MEDLINE

## (undated) DEVICE — CODMAN® SURGICAL PATTIES 1/2" X 3" (1.27CM X 7.62CM): Brand: CODMAN®

## (undated) DEVICE — CRADLE ARM INDIV PT CARE KT COMP FOR FOR RADLUC WILSON FRME

## (undated) DEVICE — TTL1LYR 16FR10ML 100%SIL TMPST TR: Brand: MEDLINE

## (undated) DEVICE — INTENDED FOR TISSUE SEPARATION, AND OTHER PROCEDURES THAT REQUIRE A SHARP SURGICAL BLADE TO PUNCTURE OR CUT.: Brand: BARD-PARKER ® CARBON RIB-BACK BLADES

## (undated) DEVICE — SUTURE VCRL SZ 2-0 L27IN ABSRB UD L26MM CT-2 1/2 CIR J269H

## (undated) DEVICE — SUTURE STRATAFIX SPRL MCRYL + SZ 3-0 L18IN ABSRB UD PS-2 SXMP1B107

## (undated) DEVICE — 4-PORT MANIFOLD: Brand: NEPTUNE 2

## (undated) DEVICE — GLOVE ORANGE PI 8 1/2   MSG9085

## (undated) DEVICE — SUTURE VCRL SZ 1 L36IN ABSRB UD L36MM CT-1 1/2 CIR J947H

## (undated) DEVICE — PACK,LAPAROTOMY,NO GOWNS: Brand: MEDLINE

## (undated) DEVICE — ANTISEPTIC 16OZ H PEROX 1ST AID ORAL DEBRIDING AGNT

## (undated) DEVICE — CODMAN® SURGICAL PATTIES 1/2" X 1/2" (1.27CM X 1.27CM): Brand: CODMAN®

## (undated) DEVICE — CATHETER IV 14 GAX2 IN STR INTROCAN SAFETY

## (undated) DEVICE — Device

## (undated) DEVICE — ENDOSCOPIC TRAY TRNSPRT 20.5X16.5X4.1 IN RECYCL SUGAR PULP

## (undated) DEVICE — Z CONVERTED USE 2271043 CONTAINER SPEC COLL 4OZ SCR ON LID PEEL PCH

## (undated) DEVICE — WAX SURG 2.5GM HEMSTAT BNE BEESWAX PARAFFIN ISO PALMITATE

## (undated) DEVICE — PAD,NON-ADHERENT,3X8,STERILE,LF,1/PK: Brand: MEDLINE

## (undated) DEVICE — SNARE ENDOSCP AD L240CM LOOP W10MM SHTH DIA2.4MM RND INSUL

## (undated) DEVICE — SYRINGE MED 10ML LUERLOCK TIP W/O SFTY DISP

## (undated) DEVICE — SYRINGE MED 30ML STD CLR PLAS LUERLOCK TIP N CTRL DISP

## (undated) DEVICE — PACK,SET UP,DRAPE: Brand: MEDLINE

## (undated) DEVICE — Z DISCONTINUED PATTY SURG 1X1 IN COTTONOID

## (undated) DEVICE — SUCKER CARD L9IN 0.25IN CONN MINI RIG SFT TIP W/ SIDE PRT

## (undated) DEVICE — SINGLE-USE POLYPECTOMY SNARE: Brand: CAPTIVATOR

## (undated) DEVICE — LIQUIBAND RAPID ADHESIVE 36/CS 0.8ML: Brand: MEDLINE

## (undated) DEVICE — KIT,ANTI FOG,W/SPONGE & FLUID,SOFT PACK: Brand: MEDLINE

## (undated) DEVICE — AGENT HEMSTAT 1GM PORCINE GEL ABSRB PWD FOR CONT OOZING